# Patient Record
Sex: MALE | Race: OTHER | NOT HISPANIC OR LATINO | Employment: FULL TIME | RURAL
[De-identification: names, ages, dates, MRNs, and addresses within clinical notes are randomized per-mention and may not be internally consistent; named-entity substitution may affect disease eponyms.]

---

## 2021-04-27 RX ORDER — NIFEDIPINE 60 MG/1
30 TABLET, EXTENDED RELEASE ORAL DAILY
COMMUNITY
End: 2021-05-12 | Stop reason: SDUPTHER

## 2021-04-27 RX ORDER — POTASSIUM CHLORIDE 20 MEQ/1
20 TABLET, EXTENDED RELEASE ORAL DAILY
COMMUNITY
End: 2021-05-12 | Stop reason: SDUPTHER

## 2021-04-27 RX ORDER — TRIAMTERENE AND HYDROCHLOROTHIAZIDE 37.5; 25 MG/1; MG/1
1-2 CAPSULE ORAL EVERY MORNING
COMMUNITY
End: 2021-05-12 | Stop reason: SDUPTHER

## 2021-04-27 RX ORDER — DOXAZOSIN 4 MG/1
4 TABLET ORAL NIGHTLY
COMMUNITY
End: 2021-05-12 | Stop reason: SDUPTHER

## 2021-04-27 RX ORDER — METFORMIN HYDROCHLORIDE 1000 MG/1
1000 TABLET ORAL 2 TIMES DAILY WITH MEALS
COMMUNITY
End: 2021-05-12 | Stop reason: SDUPTHER

## 2021-04-27 RX ORDER — TADALAFIL 20 MG/1
10 TABLET ORAL DAILY
COMMUNITY
End: 2021-05-12 | Stop reason: SDUPTHER

## 2021-04-27 RX ORDER — CLONIDINE HYDROCHLORIDE 0.2 MG/1
0.2 TABLET ORAL NIGHTLY
COMMUNITY
End: 2021-05-12 | Stop reason: SDUPTHER

## 2021-04-27 RX ORDER — POLYETHYLENE GLYCOL 3350 17 G/17G
17 POWDER, FOR SOLUTION ORAL
COMMUNITY
End: 2021-05-12 | Stop reason: SDUPTHER

## 2021-05-12 ENCOUNTER — OFFICE VISIT (OUTPATIENT)
Dept: INTERNAL MEDICINE | Facility: CLINIC | Age: 46
End: 2021-05-12

## 2021-05-12 VITALS
RESPIRATION RATE: 16 BRPM | HEART RATE: 99 BPM | DIASTOLIC BLOOD PRESSURE: 80 MMHG | HEIGHT: 67 IN | SYSTOLIC BLOOD PRESSURE: 148 MMHG | WEIGHT: 275 LBS | TEMPERATURE: 98 F | BODY MASS INDEX: 43.16 KG/M2 | OXYGEN SATURATION: 96 %

## 2021-05-12 DIAGNOSIS — E78.5 HYPERLIPIDEMIA LDL GOAL <100: ICD-10-CM

## 2021-05-12 DIAGNOSIS — E11.9 CONTROLLED TYPE 2 DIABETES MELLITUS WITHOUT COMPLICATION, WITHOUT LONG-TERM CURRENT USE OF INSULIN: ICD-10-CM

## 2021-05-12 DIAGNOSIS — I10 ESSENTIAL HYPERTENSION: Primary | ICD-10-CM

## 2021-05-12 PROCEDURE — 99214 OFFICE O/P EST MOD 30 MIN: CPT | Mod: PBBFAC | Performed by: INTERNAL MEDICINE

## 2021-05-12 PROCEDURE — 99214 HC OFFICE/OUTPT VISIT, EST, LEVL IV, 30-39 MIN: ICD-10-PCS | Mod: PBBFAC,,, | Performed by: INTERNAL MEDICINE

## 2021-05-12 PROCEDURE — 99214 OFFICE O/P EST MOD 30 MIN: CPT | Mod: S$PBB,,, | Performed by: INTERNAL MEDICINE

## 2021-05-12 PROCEDURE — 99214 OFFICE O/P EST MOD 30 MIN: CPT | Mod: PBBFAC,,, | Performed by: INTERNAL MEDICINE

## 2021-05-12 RX ORDER — POTASSIUM CHLORIDE 20 MEQ/1
20 TABLET, EXTENDED RELEASE ORAL DAILY
Qty: 30 TABLET | Refills: 11 | Status: SHIPPED | OUTPATIENT
Start: 2021-05-12 | End: 2022-06-16

## 2021-05-12 RX ORDER — TRIAMTERENE AND HYDROCHLOROTHIAZIDE 37.5; 25 MG/1; MG/1
1-2 CAPSULE ORAL EVERY MORNING
Qty: 30 CAPSULE | Refills: 11 | Status: SHIPPED | OUTPATIENT
Start: 2021-05-12 | End: 2022-03-15 | Stop reason: SDUPTHER

## 2021-05-12 RX ORDER — LISINOPRIL AND HYDROCHLOROTHIAZIDE 20; 25 MG/1; MG/1
1 TABLET ORAL DAILY
Qty: 30 TABLET | Refills: 11 | Status: SHIPPED | OUTPATIENT
Start: 2021-05-12 | End: 2022-06-16 | Stop reason: SDUPTHER

## 2021-05-12 RX ORDER — NIFEDIPINE 60 MG/1
60 TABLET, EXTENDED RELEASE ORAL DAILY
Qty: 30 TABLET | Refills: 11 | Status: SHIPPED | OUTPATIENT
Start: 2021-05-12 | End: 2022-06-16 | Stop reason: SDUPTHER

## 2021-05-12 RX ORDER — DOXAZOSIN 4 MG/1
4 TABLET ORAL NIGHTLY
Qty: 30 TABLET | Refills: 11 | Status: SHIPPED | OUTPATIENT
Start: 2021-05-12 | End: 2021-09-22

## 2021-05-12 RX ORDER — LISINOPRIL AND HYDROCHLOROTHIAZIDE 20; 25 MG/1; MG/1
1 TABLET ORAL DAILY
COMMUNITY
End: 2021-05-12 | Stop reason: SDUPTHER

## 2021-05-12 RX ORDER — CLONIDINE HYDROCHLORIDE 0.2 MG/1
0.2 TABLET ORAL NIGHTLY
Qty: 30 TABLET | Refills: 11 | Status: SHIPPED | OUTPATIENT
Start: 2021-05-12 | End: 2022-06-16 | Stop reason: SDUPTHER

## 2021-05-12 RX ORDER — POLYETHYLENE GLYCOL 3350 17 G/17G
17 POWDER, FOR SOLUTION ORAL DAILY
Qty: 30 PACKET | Refills: 11 | Status: SHIPPED | OUTPATIENT
Start: 2021-05-12 | End: 2021-09-22

## 2021-05-12 RX ORDER — METFORMIN HYDROCHLORIDE 1000 MG/1
1000 TABLET ORAL 2 TIMES DAILY WITH MEALS
Qty: 60 TABLET | Refills: 11 | Status: SHIPPED | OUTPATIENT
Start: 2021-05-12 | End: 2022-10-24 | Stop reason: DRUGHIGH

## 2021-05-12 RX ORDER — TADALAFIL 20 MG/1
20 TABLET ORAL DAILY
Qty: 10 TABLET | Refills: 11 | Status: SHIPPED | OUTPATIENT
Start: 2021-05-12 | End: 2022-03-15

## 2021-08-11 ENCOUNTER — OFFICE VISIT (OUTPATIENT)
Dept: INTERNAL MEDICINE | Facility: CLINIC | Age: 46
End: 2021-08-11
Payer: COMMERCIAL

## 2021-08-11 VITALS
WEIGHT: 260 LBS | RESPIRATION RATE: 16 BRPM | SYSTOLIC BLOOD PRESSURE: 118 MMHG | BODY MASS INDEX: 40.81 KG/M2 | OXYGEN SATURATION: 96 % | HEIGHT: 67 IN | TEMPERATURE: 99 F | DIASTOLIC BLOOD PRESSURE: 78 MMHG | HEART RATE: 98 BPM

## 2021-08-11 DIAGNOSIS — E78.5 HYPERLIPIDEMIA LDL GOAL <100: ICD-10-CM

## 2021-08-11 DIAGNOSIS — I10 ESSENTIAL HYPERTENSION: Primary | ICD-10-CM

## 2021-08-11 DIAGNOSIS — E11.9 CONTROLLED TYPE 2 DIABETES MELLITUS WITHOUT COMPLICATION, WITHOUT LONG-TERM CURRENT USE OF INSULIN: ICD-10-CM

## 2021-08-11 PROCEDURE — 99214 PR OFFICE/OUTPT VISIT, EST, LEVL IV, 30-39 MIN: ICD-10-PCS | Mod: S$PBB,,, | Performed by: INTERNAL MEDICINE

## 2021-08-11 PROCEDURE — 99214 OFFICE O/P EST MOD 30 MIN: CPT | Mod: PBBFAC | Performed by: INTERNAL MEDICINE

## 2021-08-11 PROCEDURE — 99214 OFFICE O/P EST MOD 30 MIN: CPT | Mod: S$PBB,,, | Performed by: INTERNAL MEDICINE

## 2021-09-22 ENCOUNTER — OFFICE VISIT (OUTPATIENT)
Dept: FAMILY MEDICINE | Facility: CLINIC | Age: 46
End: 2021-09-22

## 2021-09-22 VITALS
HEART RATE: 90 BPM | HEIGHT: 67 IN | OXYGEN SATURATION: 98 % | BODY MASS INDEX: 41.53 KG/M2 | WEIGHT: 264.63 LBS | RESPIRATION RATE: 20 BRPM | DIASTOLIC BLOOD PRESSURE: 78 MMHG | TEMPERATURE: 98 F | SYSTOLIC BLOOD PRESSURE: 122 MMHG

## 2021-09-22 DIAGNOSIS — E78.00 HYPERCHOLESTEREMIA: ICD-10-CM

## 2021-09-22 DIAGNOSIS — E78.2 MIXED HYPERLIPIDEMIA: ICD-10-CM

## 2021-09-22 DIAGNOSIS — E66.01 SEVERE OBESITY (BMI >= 40): ICD-10-CM

## 2021-09-22 DIAGNOSIS — E11.65 TYPE 2 DIABETES MELLITUS WITH HYPERGLYCEMIA, WITHOUT LONG-TERM CURRENT USE OF INSULIN: Primary | ICD-10-CM

## 2021-09-22 DIAGNOSIS — I10 ESSENTIAL HYPERTENSION: ICD-10-CM

## 2021-09-22 DIAGNOSIS — N52.9 ERECTILE DYSFUNCTION, UNSPECIFIED ERECTILE DYSFUNCTION TYPE: ICD-10-CM

## 2021-09-22 PROCEDURE — 99203 OFFICE O/P NEW LOW 30 MIN: CPT | Mod: ,,, | Performed by: NURSE PRACTITIONER

## 2021-09-22 PROCEDURE — 99203 PR OFFICE/OUTPT VISIT, NEW, LEVL III, 30-44 MIN: ICD-10-PCS | Mod: ,,, | Performed by: NURSE PRACTITIONER

## 2021-09-22 RX ORDER — PRAVASTATIN SODIUM 20 MG/1
20 TABLET ORAL DAILY
Qty: 90 TABLET | Refills: 3 | Status: SHIPPED | OUTPATIENT
Start: 2021-09-22 | End: 2022-06-16 | Stop reason: SDUPTHER

## 2021-10-03 ENCOUNTER — HOSPITAL ENCOUNTER (EMERGENCY)
Facility: HOSPITAL | Age: 46
Discharge: HOME | End: 2021-10-03
Payer: COMMERCIAL

## 2021-10-03 VITALS
DIASTOLIC BLOOD PRESSURE: 82 MMHG | RESPIRATION RATE: 20 BRPM | SYSTOLIC BLOOD PRESSURE: 155 MMHG | TEMPERATURE: 99 F | WEIGHT: 262 LBS | HEART RATE: 83 BPM | OXYGEN SATURATION: 98 % | BODY MASS INDEX: 41.12 KG/M2 | HEIGHT: 67 IN

## 2021-10-03 DIAGNOSIS — H60.392 OTHER INFECTIVE ACUTE OTITIS EXTERNA OF LEFT EAR: Primary | ICD-10-CM

## 2021-10-03 DIAGNOSIS — J06.9 ACUTE UPPER RESPIRATORY INFECTION: ICD-10-CM

## 2021-10-03 LAB — GLUCOSE SERPL-MCNC: 99 MG/DL (ref 70–105)

## 2021-10-03 PROCEDURE — 96372 THER/PROPH/DIAG INJ SC/IM: CPT

## 2021-10-03 PROCEDURE — 82962 GLUCOSE BLOOD TEST: CPT

## 2021-10-03 PROCEDURE — 99284 EMERGENCY DEPT VISIT MOD MDM: CPT

## 2021-10-03 PROCEDURE — 99283 PR EMERGENCY DEPT VISIT,LEVEL III: ICD-10-PCS | Mod: ,,, | Performed by: NURSE PRACTITIONER

## 2021-10-03 PROCEDURE — 99283 EMERGENCY DEPT VISIT LOW MDM: CPT | Mod: ,,, | Performed by: NURSE PRACTITIONER

## 2021-10-03 PROCEDURE — 63600175 PHARM REV CODE 636 W HCPCS: Performed by: NURSE PRACTITIONER

## 2021-10-03 RX ORDER — METHYLPREDNISOLONE 4 MG/1
TABLET ORAL
Qty: 1 PACKAGE | Refills: 0 | Status: SHIPPED | OUTPATIENT
Start: 2021-10-03 | End: 2021-10-24

## 2021-10-03 RX ORDER — NEOMYCIN SULFATE, POLYMYXIN B SULFATE AND HYDROCORTISONE 10; 3.5; 1 MG/ML; MG/ML; [USP'U]/ML
4 SUSPENSION/ DROPS AURICULAR (OTIC) 3 TIMES DAILY
Qty: 10 ML | Refills: 0 | Status: SHIPPED | OUTPATIENT
Start: 2021-10-03 | End: 2022-01-04 | Stop reason: ALTCHOICE

## 2021-10-03 RX ORDER — CEFDINIR 300 MG/1
300 CAPSULE ORAL 2 TIMES DAILY
Qty: 20 CAPSULE | Refills: 0 | Status: SHIPPED | OUTPATIENT
Start: 2021-10-03 | End: 2021-10-13

## 2021-10-03 RX ORDER — CEFTRIAXONE 1 G/1
1 INJECTION, POWDER, FOR SOLUTION INTRAMUSCULAR; INTRAVENOUS
Status: COMPLETED | OUTPATIENT
Start: 2021-10-03 | End: 2021-10-03

## 2021-10-03 RX ADMIN — CEFTRIAXONE SODIUM 1 G: 1 INJECTION, POWDER, FOR SOLUTION INTRAMUSCULAR; INTRAVENOUS at 02:10

## 2021-11-10 ENCOUNTER — HOSPITAL ENCOUNTER (EMERGENCY)
Facility: HOSPITAL | Age: 46
Discharge: HOME | End: 2021-11-10
Payer: COMMERCIAL

## 2021-11-10 VITALS
BODY MASS INDEX: 39.71 KG/M2 | SYSTOLIC BLOOD PRESSURE: 135 MMHG | DIASTOLIC BLOOD PRESSURE: 85 MMHG | HEIGHT: 67 IN | WEIGHT: 253 LBS | TEMPERATURE: 98 F | RESPIRATION RATE: 17 BRPM | OXYGEN SATURATION: 99 % | HEART RATE: 85 BPM

## 2021-11-10 DIAGNOSIS — R20.2 PARESTHESIAS: Primary | ICD-10-CM

## 2021-11-10 DIAGNOSIS — N52.9 ERECTILE DYSFUNCTION: ICD-10-CM

## 2021-11-10 DIAGNOSIS — E78.00 HYPERCHOLESTEREMIA: ICD-10-CM

## 2021-11-10 DIAGNOSIS — E66.01 SEVERE OBESITY (BMI >= 40): ICD-10-CM

## 2021-11-10 DIAGNOSIS — E11.9 DIABETES MELLITUS, TYPE 2: ICD-10-CM

## 2021-11-10 DIAGNOSIS — I10 HYPERTENSION: ICD-10-CM

## 2021-11-10 LAB — GLUCOSE SERPL-MCNC: 117 MG/DL (ref 70–105)

## 2021-11-10 PROCEDURE — 99282 EMERGENCY DEPT VISIT SF MDM: CPT

## 2021-11-10 PROCEDURE — 99283 EMERGENCY DEPT VISIT LOW MDM: CPT | Mod: ,,, | Performed by: NURSE PRACTITIONER

## 2021-11-10 PROCEDURE — 82962 GLUCOSE BLOOD TEST: CPT

## 2021-11-10 PROCEDURE — 99283 PR EMERGENCY DEPT VISIT,LEVEL III: ICD-10-PCS | Mod: ,,, | Performed by: NURSE PRACTITIONER

## 2022-01-04 ENCOUNTER — TELEPHONE (OUTPATIENT)
Dept: FAMILY MEDICINE | Facility: CLINIC | Age: 47
End: 2022-01-04
Payer: COMMERCIAL

## 2022-01-04 ENCOUNTER — OFFICE VISIT (OUTPATIENT)
Dept: FAMILY MEDICINE | Facility: CLINIC | Age: 47
End: 2022-01-04
Payer: COMMERCIAL

## 2022-01-04 VITALS
BODY MASS INDEX: 39.43 KG/M2 | TEMPERATURE: 98 F | HEART RATE: 85 BPM | HEIGHT: 67 IN | DIASTOLIC BLOOD PRESSURE: 76 MMHG | OXYGEN SATURATION: 97 % | WEIGHT: 251.19 LBS | SYSTOLIC BLOOD PRESSURE: 122 MMHG | RESPIRATION RATE: 16 BRPM

## 2022-01-04 DIAGNOSIS — Z12.5 PROSTATE CANCER SCREENING: ICD-10-CM

## 2022-01-04 DIAGNOSIS — Z79.899 ENCOUNTER FOR LONG-TERM (CURRENT) USE OF OTHER MEDICATIONS: ICD-10-CM

## 2022-01-04 DIAGNOSIS — Z11.59 NEED FOR HEPATITIS C SCREENING TEST: ICD-10-CM

## 2022-01-04 DIAGNOSIS — E78.00 HYPERCHOLESTEREMIA: ICD-10-CM

## 2022-01-04 DIAGNOSIS — E11.65 TYPE 2 DIABETES MELLITUS WITH HYPERGLYCEMIA, WITHOUT LONG-TERM CURRENT USE OF INSULIN: Primary | ICD-10-CM

## 2022-01-04 DIAGNOSIS — I10 PRIMARY HYPERTENSION: ICD-10-CM

## 2022-01-04 LAB
ALBUMIN SERPL BCP-MCNC: 4 G/DL (ref 3.5–5)
ALBUMIN/GLOB SERPL: 0.9 {RATIO}
ALP SERPL-CCNC: 82 U/L (ref 45–115)
ALT SERPL W P-5'-P-CCNC: 53 U/L (ref 16–61)
ANION GAP SERPL CALCULATED.3IONS-SCNC: 12 MMOL/L (ref 7–16)
AST SERPL W P-5'-P-CCNC: 20 U/L (ref 15–37)
BASOPHILS # BLD AUTO: 0.06 K/UL (ref 0–0.2)
BASOPHILS NFR BLD AUTO: 0.7 % (ref 0–1)
BILIRUB SERPL-MCNC: 0.9 MG/DL (ref 0–1.2)
BUN SERPL-MCNC: 24 MG/DL (ref 7–18)
BUN/CREAT SERPL: 22 (ref 6–20)
CALCIUM SERPL-MCNC: 9.3 MG/DL (ref 8.5–10.1)
CHLORIDE SERPL-SCNC: 102 MMOL/L (ref 98–107)
CHOLEST SERPL-MCNC: 160 MG/DL (ref 0–200)
CHOLEST/HDLC SERPL: 3.1 {RATIO}
CO2 SERPL-SCNC: 27 MMOL/L (ref 21–32)
CREAT SERPL-MCNC: 1.07 MG/DL (ref 0.7–1.3)
CREAT UR-MCNC: 75 MG/DL (ref 39–259)
DIFFERENTIAL METHOD BLD: ABNORMAL
EOSINOPHIL # BLD AUTO: 0.14 K/UL (ref 0–0.5)
EOSINOPHIL NFR BLD AUTO: 1.7 % (ref 1–4)
ERYTHROCYTE [DISTWIDTH] IN BLOOD BY AUTOMATED COUNT: 15.7 % (ref 11.5–14.5)
EST. AVERAGE GLUCOSE BLD GHB EST-MCNC: 127 MG/DL
GLOBULIN SER-MCNC: 4.5 G/DL (ref 2–4)
GLUCOSE SERPL-MCNC: 136 MG/DL (ref 74–106)
HBA1C MFR BLD HPLC: 6.4 % (ref 4.5–6.6)
HCT VFR BLD AUTO: 44.6 % (ref 40–54)
HCV AB SER QL: NORMAL
HDLC SERPL-MCNC: 51 MG/DL (ref 40–60)
HGB BLD-MCNC: 14 G/DL (ref 13.5–18)
IMM GRANULOCYTES # BLD AUTO: 0.01 K/UL (ref 0–0.04)
IMM GRANULOCYTES NFR BLD: 0.1 % (ref 0–0.4)
LDLC SERPL CALC-MCNC: 82 MG/DL
LDLC/HDLC SERPL: 1.6 {RATIO}
LYMPHOCYTES # BLD AUTO: 3.1 K/UL (ref 1–4.8)
LYMPHOCYTES NFR BLD AUTO: 38.7 % (ref 27–41)
MCH RBC QN AUTO: 25.4 PG (ref 27–31)
MCHC RBC AUTO-ENTMCNC: 31.4 G/DL (ref 32–36)
MCV RBC AUTO: 80.8 FL (ref 80–96)
MICROALBUMIN UR-MCNC: 4 MG/DL (ref 0–2.8)
MICROALBUMIN/CREAT RATIO PNL UR: 53.3 MG/G (ref 0–30)
MONOCYTES # BLD AUTO: 0.44 K/UL (ref 0–0.8)
MONOCYTES NFR BLD AUTO: 5.5 % (ref 2–6)
MPC BLD CALC-MCNC: 11.5 FL (ref 9.4–12.4)
NEUTROPHILS # BLD AUTO: 4.26 K/UL (ref 1.8–7.7)
NEUTROPHILS NFR BLD AUTO: 53.3 % (ref 53–65)
NONHDLC SERPL-MCNC: 109 MG/DL
NRBC # BLD AUTO: 0 X10E3/UL
NRBC, AUTO (.00): 0 %
PLATELET # BLD AUTO: 272 K/UL (ref 150–400)
POTASSIUM SERPL-SCNC: 4.2 MMOL/L (ref 3.5–5.1)
PROT SERPL-MCNC: 8.5 G/DL (ref 6.4–8.2)
PSA SERPL-MCNC: 0.35 NG/ML (ref 0–2)
RBC # BLD AUTO: 5.52 M/UL (ref 4.6–6.2)
SODIUM SERPL-SCNC: 137 MMOL/L (ref 136–145)
TRIGL SERPL-MCNC: 134 MG/DL (ref 35–150)
TSH SERPL DL<=0.005 MIU/L-ACNC: 0.91 UIU/ML (ref 0.36–3.74)
VLDLC SERPL-MCNC: 27 MG/DL
WBC # BLD AUTO: 8.01 K/UL (ref 4.5–11)

## 2022-01-04 PROCEDURE — 80050 CBC WITH DIFFERENTIAL: ICD-10-PCS | Mod: ICN,,, | Performed by: CLINICAL MEDICAL LABORATORY

## 2022-01-04 PROCEDURE — 82570 ASSAY OF URINE CREATININE: CPT | Mod: ICN,,, | Performed by: CLINICAL MEDICAL LABORATORY

## 2022-01-04 PROCEDURE — 3008F PR BODY MASS INDEX (BMI) DOCUMENTED: ICD-10-PCS | Mod: ,,, | Performed by: NURSE PRACTITIONER

## 2022-01-04 PROCEDURE — 99213 PR OFFICE/OUTPT VISIT, EST, LEVL III, 20-29 MIN: ICD-10-PCS | Mod: ,,, | Performed by: NURSE PRACTITIONER

## 2022-01-04 PROCEDURE — 80061 LIPID PANEL: ICD-10-PCS | Mod: ICN,,, | Performed by: CLINICAL MEDICAL LABORATORY

## 2022-01-04 PROCEDURE — 1159F MED LIST DOCD IN RCRD: CPT | Mod: ,,, | Performed by: NURSE PRACTITIONER

## 2022-01-04 PROCEDURE — 3078F DIAST BP <80 MM HG: CPT | Mod: ,,, | Performed by: NURSE PRACTITIONER

## 2022-01-04 PROCEDURE — 86803 HEPATITIS C AB TEST: CPT | Mod: ICN,,, | Performed by: CLINICAL MEDICAL LABORATORY

## 2022-01-04 PROCEDURE — 3074F PR MOST RECENT SYSTOLIC BLOOD PRESSURE < 130 MM HG: ICD-10-PCS | Mod: ,,, | Performed by: NURSE PRACTITIONER

## 2022-01-04 PROCEDURE — 80050 GENERAL HEALTH PANEL: CPT | Mod: ICN,,, | Performed by: CLINICAL MEDICAL LABORATORY

## 2022-01-04 PROCEDURE — 99213 OFFICE O/P EST LOW 20 MIN: CPT | Mod: ,,, | Performed by: NURSE PRACTITIONER

## 2022-01-04 PROCEDURE — G0103 PSA SCREENING: HCPCS | Mod: ICN,,, | Performed by: CLINICAL MEDICAL LABORATORY

## 2022-01-04 PROCEDURE — 3078F PR MOST RECENT DIASTOLIC BLOOD PRESSURE < 80 MM HG: ICD-10-PCS | Mod: ,,, | Performed by: NURSE PRACTITIONER

## 2022-01-04 PROCEDURE — G0103 PSA, SCREENING: ICD-10-PCS | Mod: ICN,,, | Performed by: CLINICAL MEDICAL LABORATORY

## 2022-01-04 PROCEDURE — 83036 HEMOGLOBIN GLYCOSYLATED A1C: CPT | Mod: ICN,,, | Performed by: CLINICAL MEDICAL LABORATORY

## 2022-01-04 PROCEDURE — 82570 MICROALBUMIN / CREATININE RATIO URINE: ICD-10-PCS | Mod: ICN,,, | Performed by: CLINICAL MEDICAL LABORATORY

## 2022-01-04 PROCEDURE — 3052F PR MOST RECENT HEMOGLOBIN A1C LEVEL 8.0 - < 9.0%: ICD-10-PCS | Mod: ,,, | Performed by: NURSE PRACTITIONER

## 2022-01-04 PROCEDURE — 80061 LIPID PANEL: CPT | Mod: ICN,,, | Performed by: CLINICAL MEDICAL LABORATORY

## 2022-01-04 PROCEDURE — 82043 MICROALBUMIN / CREATININE RATIO URINE: ICD-10-PCS | Mod: ICN,,, | Performed by: CLINICAL MEDICAL LABORATORY

## 2022-01-04 PROCEDURE — 3074F SYST BP LT 130 MM HG: CPT | Mod: ,,, | Performed by: NURSE PRACTITIONER

## 2022-01-04 PROCEDURE — 86803 HEPATITIS C ANTIBODY: ICD-10-PCS | Mod: ICN,,, | Performed by: CLINICAL MEDICAL LABORATORY

## 2022-01-04 PROCEDURE — 3052F HG A1C>EQUAL 8.0%<EQUAL 9.0%: CPT | Mod: ,,, | Performed by: NURSE PRACTITIONER

## 2022-01-04 PROCEDURE — 3008F BODY MASS INDEX DOCD: CPT | Mod: ,,, | Performed by: NURSE PRACTITIONER

## 2022-01-04 PROCEDURE — 83036 HEMOGLOBIN A1C: ICD-10-PCS | Mod: ICN,,, | Performed by: CLINICAL MEDICAL LABORATORY

## 2022-01-04 PROCEDURE — 82043 UR ALBUMIN QUANTITATIVE: CPT | Mod: ICN,,, | Performed by: CLINICAL MEDICAL LABORATORY

## 2022-01-04 PROCEDURE — 1159F PR MEDICATION LIST DOCUMENTED IN MEDICAL RECORD: ICD-10-PCS | Mod: ,,, | Performed by: NURSE PRACTITIONER

## 2022-01-04 RX ORDER — SEMAGLUTIDE 1.34 MG/ML
1 INJECTION, SOLUTION SUBCUTANEOUS
Qty: 3 PEN | Refills: 3 | Status: SHIPPED | OUTPATIENT
Start: 2022-01-04 | End: 2022-06-16 | Stop reason: SDUPTHER

## 2022-01-04 NOTE — LETTER
January 4, 2022      [facility] - Family Medicine  Formerly [facility]  [place] [address]  Phone: [phone]  Fax: [phone]       Patient: Russel Hill   YOB: 1975  Date of Visit: 01/04/2022    To Whom It May Concern:    Asher Hill  was at [facility] on 01/04/2022. The patient may return to work/school on 01/04/2022 with no restrictions. If you have any questions or concerns, or if I can be of further assistance, please do not hesitate to contact me.    Sincerely,    MICAELA Zapien

## 2022-01-04 NOTE — PROGRESS NOTES
[facility] - FAMILY MEDICINE       PATIENT NAME: Russel Hill   : 1975    AGE: 46 y.o. DATE: 2022    MRN: [id number]        Reason for Visit / Chief Complaint:  Follow-up (Pt presents for 3 month DM, HTN, and HLD follow up. ), Hypertension, Hyperlipidemia, and Diabetes     Subjective:     HPI:  Presents for 3 mth f/u uncontrolled T2DM, HTN, and HLD. Previously saw Dr. Camejo.    Dx with DM approx . Rare A1c noted in HAC - 9.1% 2015 and 6.8% 2018.  A1c 8.7% May 2021    Has been watching diet and was exercising some but now back on 12 hr shifts. Was down 22 lbs but gained some back; still down 14 lbs from previous visit.    Monitoring glucose daily with levels ranging  after drinking champagne on [place].   Medications: metformin - causes constipation only taking 1/2 tab 2x/day; wants to start Ozempic     PHQ9 2022   Total Score 0       Review of Systems:     Review of Systems   Constitutional: Negative.    HENT: Negative.    Eyes: Negative.    Respiratory: Negative.    Cardiovascular: Negative.    Gastrointestinal: Negative.    Endocrine: Negative.    Genitourinary: Negative.    Musculoskeletal: Negative.    Skin: Negative.    Allergic/Immunologic: Negative.    Neurological: Negative.    Hematological: Negative.    Psychiatric/Behavioral: Negative.        Allergies and Medications:     Review of patient's allergies indicates:   Allergen Reactions    Marijuana/cannabinoid     Melon      watermellon        Current Outpatient Medications on File Prior to Visit   Medication Sig Dispense Refill    cloNIDine (CATAPRES) 0.2 MG tablet Take 1 tablet (0.2 mg total) by mouth every evening. 30 tablet 11    lisinopriL-hydrochlorothiazide (PRINZIDE,ZESTORETIC) 20-25 mg Tab Take 1 tablet by mouth once daily. 30 tablet 11    metFORMIN (GLUCOPHAGE) 1000 MG tablet Take 1 tablet (1,000 mg total) by mouth 2 (two) times daily with  meals. 60 tablet 11    NIFEdipine (ADALAT CC) 60 MG TbSR Take 1 tablet (60 mg total) by mouth once daily. 30 tablet 11    potassium chloride SA (K-DUR,KLOR-CON) 20 MEQ tablet Take 1 tablet (20 mEq total) by mouth once daily. 30 tablet 11    pravastatin (PRAVACHOL) 20 MG tablet Take 1 tablet (20 mg total) by mouth once daily. 90 tablet 3    triamterene-hydrochlorothiazide 37.5-25 mg (DYAZIDE) 37.5-25 mg per capsule Take 1-2 capsules by mouth every morning. 30 capsule 11    tadalafiL (CIALIS) 20 MG Tab Take 1 tablet (20 mg total) by mouth once daily. 1/2 tab one hour before sex (Patient not taking: Reported on 1/4/2022) 10 tablet 11    [DISCONTINUED] neomycin-polymyxin-hydrocortisone (CORTISPORIN) 3.5-10,000-1 mg/mL-unit/mL-% otic suspension Place 4 drops into the left ear 3 (three) times daily. (Patient not taking: Reported on 1/4/2022) 10 mL 0     No current facility-administered medications on file prior to visit.       Labs:      Lab Results   Component Value Date    HGBA1C 8.7 (H) 05/12/2021      Lipids:   Lab Results   Component Value Date    CHOL 232 (H) 05/12/2021     Lab Results   Component Value Date    HDL 52 05/12/2021     Lab Results   Component Value Date    LDLCALC 140 05/12/2021     Lab Results   Component Value Date    TRIG 199 (H) 05/12/2021     Lab Results   Component Value Date    CHOLHDL 4.5 05/12/2021      Urine Ma/creat ratio:  No results found for: MICROALBUR, [id number]   CMP:  Sodium   Date Value Ref Range Status   05/12/2021 138 136 - 145 mmol/L Final     Potassium   Date Value Ref Range Status   05/12/2021 3.4 (L) 3.5 - 5.1 mmol/L Final     Chloride   Date Value Ref Range Status   05/12/2021 103 98 - 107 mmol/L Final     CO2   Date Value Ref Range Status   05/12/2021 29 21 - 32 mmol/L Final     Glucose   Date Value Ref Range Status   05/12/2021 170 (H) 74 - 106 mg/dL Final     BUN   Date Value Ref Range Status   05/12/2021 17 7 - 18 mg/dL Final     Creatinine   Date Value Ref Range  Status   05/12/2021 0.93 0.70 - 1.30 mg/dL Final     Calcium   Date Value Ref Range Status   05/12/2021 9.8 8.5 - 10.1 mg/dL Final     Total Protein   Date Value Ref Range Status   05/12/2021 8.2 6.4 - 8.2 g/dL Final     Albumin   Date Value Ref Range Status   05/12/2021 3.7 3.5 - 5.0 g/dL Final     Bilirubin, Total   Date Value Ref Range Status   05/12/2021 0.5 >0.0 - 1.2 mg/dL Final     Alk Phos   Date Value Ref Range Status   05/12/2021 109 45 - 115 U/L Final     AST   Date Value Ref Range Status   05/12/2021 17 15 - 37 U/L Final     ALT   Date Value Ref Range Status   05/12/2021 48 16 - 61 U/L Final     Anion Gap   Date Value Ref Range Status   05/12/2021 9 7 - 16 mmol/L Final     eGFR    Date Value Ref Range Status   05/12/2021 113 >=60 mL/min/1.73m² Final      CBC:  Lab Results   Component Value Date    WBC 10.86 05/12/2021    RBC 5.30 05/12/2021    HGB 13.0 (L) 05/12/2021    HCT 41.6 05/12/2021    MCV 78.5 (L) 05/12/2021    MCH 24.5 (L) 05/12/2021    MCHC 31.3 (L) 05/12/2021    RDW 16.4 (H) 05/12/2021     05/12/2021    MPV 11.7 05/12/2021    LYMPH 27.3 05/12/2021    LYMPH 2.96 05/12/2021    MONO 5.9 05/12/2021    EOS 0.15 05/12/2021    BASO 0.06 05/12/2021    EOSINOPHIL 1.4 05/12/2021    BASOPHIL 0.6 05/12/2021      Lab Results   Component Value Date    TSH 0.974 05/12/2021         Medical/Social/Family History:     Past Medical History:   Diagnosis Date    Constipation     Diabetes mellitus, type 2 02/12/2014    Erectile dysfunction     Hypercholesteremia 03/04/2016    Hypertension     Severe obesity (BMI >= 40) 10/01/2012      Social History     Tobacco Use   Smoking Status Never Smoker   Smokeless Tobacco Never Used      Social History     Substance and Sexual Activity   Alcohol Use Never       Family History   Problem Relation Age of Onset    Cancer Mother     Diabetes Mother     Hypertension Mother     Hypertension Father     Diabetes Father     Hypertension Sister      "Hypertension Brother     No Known Problems Daughter     No Known Problems Son     Cancer Maternal Grandmother     Diabetes Maternal Grandmother     Hypertension Maternal Grandmother     Cancer Maternal Grandfather     Diabetes Maternal Grandfather     Hypertension Maternal Grandfather     Cancer Paternal Grandmother     Diabetes Paternal Grandmother     Hypertension Paternal Grandmother     Cancer Paternal Grandfather     Diabetes Paternal Grandfather     Hypertension Paternal Grandfather     No Known Problems Daughter     No Known Problems Daughter     No Known Problems Daughter     No Known Problems Son       No past surgical history on file.     Health Maintenance:     There is no immunization history on file for this patient.   Health Maintenance Due   Topic Date Due    Hepatitis C Screening  Never done    Diabetes Urine Screening  Never done    COVID-19 Vaccine (1) Never done    Pneumococcal Vaccines (Age 0-64) (1 of 2 - PPSV23) Never done    Foot Exam  Never done    Eye Exam  Never done    HIV Screening  Never done    TETANUS VACCINE  Never done    Colorectal Cancer Screening  Never done    Influenza Vaccine (1) Never done    Hemoglobin A1c  11/12/2021     Health Maintenance Topics with due status: Not Due       Topic Last Completion Date    Lipid Panel 05/12/2021    Low Dose Statin 01/04/2022       Objective:      Vitals:    01/04/22 0944   BP: 122/76   BP Location: Right arm   Patient Position: Sitting   BP Method: Large (Manual)   Pulse: 85   Resp: 16   Temp: 98.1 °F (36.7 °C)   SpO2: 97%   Weight: 113.9 kg (251 lb 3.2 oz)   Height: 5' 7" (1.702 m)     Body mass index is 39.34 kg/m².     Physical Exam:    Physical Exam  Vitals and nursing note reviewed.   Constitutional:       Appearance: Normal appearance. He is obese.   HENT:      Head: Normocephalic.   Eyes:      Conjunctiva/sclera: Conjunctivae normal.      Pupils: Pupils are equal, round, and reactive to light.   Neck:      " Thyroid: No thyromegaly.      Vascular: No carotid bruit.      Trachea: Trachea normal.   Cardiovascular:      Rate and Rhythm: Normal rate and regular rhythm.      Pulses:           Dorsalis pedis pulses are 3+ on the right side and 3+ on the left side.        Posterior tibial pulses are 3+ on the right side and 3+ on the left side.      Heart sounds: Normal heart sounds.   Pulmonary:      Effort: Pulmonary effort is normal.      Breath sounds: Normal breath sounds.   Chest:   Breasts:      Right: No supraclavicular adenopathy.      Left: No supraclavicular adenopathy.       Musculoskeletal:      Cervical back: Neck supple.      Right lower leg: No edema.      Left lower leg: No edema.      Right foot: Normal range of motion. No deformity or bunion.      Left foot: Normal range of motion. No deformity or bunion.   Feet:      Right foot:      Protective Sensation: 10 sites tested. 10 sites sensed.      Skin integrity: Skin integrity normal. No ulcer, blister, erythema, warmth or callus.      Toenail Condition: Right toenails are abnormally thick. Fungal disease present.     Left foot:      Protective Sensation: 10 sites tested. 10 sites sensed.      Skin integrity: Skin integrity normal. No ulcer, blister, erythema, warmth or callus.      Toenail Condition: Left toenails are abnormally thick. Fungal disease present.  Lymphadenopathy:      Cervical: No cervical adenopathy.      Upper Body:      Right upper body: No supraclavicular adenopathy.      Left upper body: No supraclavicular adenopathy.   Skin:     General: Skin is warm and dry.      Findings: No rash.   Neurological:      General: No focal deficit present.      Mental Status: He is alert and oriented to person, place, and time.   Psychiatric:         Mood and Affect: Mood normal.         Behavior: Behavior normal.          Assessment:          ICD-10-CM ICD-9-CM   1. Type 2 diabetes mellitus with hyperglycemia, without long-term current use of insulin   E11.65 250.00     790.29   2. Primary hypertension  I10 401.9   3. Hypercholesteremia  E78.00 272.0   4. Encounter for long-term (current) use of other medications  Z79.899 V58.69   5. Need for hepatitis C screening test  Z11.59 V73.89   6. Prostate cancer screening  Z12.5 V76.44        Plan:       Type 2 diabetes mellitus with hyperglycemia, without long-term current use of insulin  -     Comprehensive Metabolic Panel; Future; Expected date: 01/04/2022  -     Lipid Panel; Future; Expected date: 01/04/2022  -     TSH; Future; Expected date: 01/04/2022  -     Microalbumin/Creatinine Ratio, Urine; Future; Expected date: 01/04/2022  -     Hemoglobin A1C; Future; Expected date: 01/04/2022  -     semaglutide (OZEMPIC) 1 mg/dose (4 mg/3 mL); Inject 1 mg into the skin every 7 days.  Dispense: 3 pen; Refill: 3    Primary hypertension  -     Comprehensive Metabolic Panel; Future; Expected date: 01/04/2022  -     Lipid Panel; Future; Expected date: 01/04/2022    Hypercholesteremia  -     Comprehensive Metabolic Panel; Future; Expected date: 01/04/2022  -     Lipid Panel; Future; Expected date: 01/04/2022  -     TSH; Future; Expected date: 01/04/2022    Encounter for long-term (current) use of other medications  -     CBC Auto Differential; Future; Expected date: 01/04/2022  -     Comprehensive Metabolic Panel; Future; Expected date: 01/04/2022  -     TSH; Future; Expected date: 01/04/2022    Need for hepatitis C screening test  -     Hepatitis C Antibody; Future; Expected date: 01/04/2022    Prostate cancer screening  -     PSA, Screening; Future; Expected date: 01/04/2022        Current Outpatient Medications:     cloNIDine (CATAPRES) 0.2 MG tablet, Take 1 tablet (0.2 mg total) by mouth every evening., Disp: 30 tablet, Rfl: 11    lisinopriL-hydrochlorothiazide (PRINZIDE,ZESTORETIC) 20-25 mg Tab, Take 1 tablet by mouth once daily., Disp: 30 tablet, Rfl: 11    metFORMIN (GLUCOPHAGE) 1000 MG tablet, Take 1 tablet (1,000 mg  total) by mouth 2 (two) times daily with meals., Disp: 60 tablet, Rfl: 11    NIFEdipine (ADALAT CC) 60 MG TbSR, Take 1 tablet (60 mg total) by mouth once daily., Disp: 30 tablet, Rfl: 11    potassium chloride SA (K-DUR,KLOR-CON) 20 MEQ tablet, Take 1 tablet (20 mEq total) by mouth once daily., Disp: 30 tablet, Rfl: 11    pravastatin (PRAVACHOL) 20 MG tablet, Take 1 tablet (20 mg total) by mouth once daily., Disp: 90 tablet, Rfl: 3    triamterene-hydrochlorothiazide 37.5-25 mg (DYAZIDE) 37.5-25 mg per capsule, Take 1-2 capsules by mouth every morning., Disp: 30 capsule, Rfl: 11    semaglutide (OZEMPIC) 1 mg/dose (4 mg/3 mL), Inject 1 mg into the skin every 7 days., Disp: 3 pen, Rfl: 3    tadalafiL (CIALIS) 20 MG Tab, Take 1 tablet (20 mg total) by mouth once daily. 1/2 tab one hour before sex (Patient not taking: Reported on 1/4/2022), Disp: 10 tablet, Rfl: 11      New & refilled meds:  Requested Prescriptions     Signed Prescriptions Disp Refills    semaglutide (OZEMPIC) 1 mg/dose (4 mg/3 mL) 3 pen 3     Sig: Inject 1 mg into the skin every 7 days.        Lab results and schedule of future lab studies reviewed with patient.   Reviewed diet, exercise and weight control.    Discussed foot care.   Declines vaccines.   Add Ozempic - give samples to start. Let us know if unable to get rx. Will take 0.25 mg weekly x 4 weeks, 0.5 mg weekly x 4 weeks then start 1 mg rx.     Return to clinic in 3 months for T2DM, HTN, HLD; and as needed.    Future Appointments   Date Time Provider Department Center   4/5/2022  8:00 AM MICAELA Lowry [facility] Torres        Signature:  MICAELA Lowry [facility] PRIMARY CARE - FAMILY MEDICINE    Date of encounter: 1/4/22

## 2022-01-04 NOTE — PATIENT INSTRUCTIONS
Patient Education       Type 2 Diabetes Discharge Instructions   About this topic   Type 2 diabetes is the most common type of diabetes. If you have this illness, your body does not make enough insulin or does not correctly use the insulin it does make. When you eat, your body breaks down all sugars and starches into glucose. Your body needs insulin to use glucose for energy. The insulin takes the glucose from your blood into your cells. If you do not have enough insulin, or your body does not use the insulin well, the glucose or sugar stays in your blood instead of going into your cells. This causes your blood sugar levels to be too high. Over time, this can damage your heart, nerves, eyes, kidneys, and other organs.     What care is needed at home?   Learn how to take care of your diabetes.  · Ask your doctor what you need to do when you go home. Make sure you ask questions if you do not understand what the doctor says. This way you will know what you need to do.  · Based on what diabetes drugs you take, you might need to check your blood sugar regularly at home. But not everyone with type 2 diabetes needs to do this. If you need to, your doctor will teach you how to check your blood sugar. Ask what the goal numbers are for your blood sugar. Keep a list of your blood sugar levels. This will help you learn what causes high or low readings and help you manage your diabetes.     · Take your diabetes drugs as directed. Ask what to do if you miss a dose of your diabetes drugs.  · Learn when, what, and how much to eat.  · Be active. Walk, garden, or do something active for 30 minutes or more on most days of the week. This will also help you control your weight. Ask your doctor for proper food and exercise programs to follow.  · Check your feet often. Look for blisters or sores. Always wear socks and shoes. Never walk barefoot, especially outdoors. Take special care around the pool and at the beach, as these surfaces may  be extremely hot and burn your feet. Report any problems with your feet to your doctor.  · Wear a medical ID.  · Limit or avoid beer, wine, and mixed drinks (alcohol).  · If you smoke, try to quit. Your doctor or nurse can help.  · Talk with your doctor about if you need to check your blood sugar at home.  · If you are overweight, ask your doctor if you would be a good candidate for bariatric surgery as this can reverse diabetes in some cases.  What follow-up care is needed?   · Your doctor may ask you to make visits to the office to check on your progress. Be sure to keep these visits.  What drugs may be needed?   Diabetes drugs will help control your blood sugar. You may have more than one diabetes drug. Your doctor may order drugs for you to take by mouth or insulin as a shot. You will be trained on how to give insulin shots, if needed. Talk to your doctor about your diabetes drugs and what you need to do when you go home.  Will physical activity be limited?   · Being active can lower blood sugar and blood pressure. It can also help control weight. Be sure to check with your doctor before starting any exercise program.  · Try to walk, bike, or swim every day. Start with 5 to 10 minutes each day. Work up to about 30 minutes most days.  · Drink lots of water during workouts.  What changes to diet are needed?   Eating a healthy diet is important. This means you need to eat regularly throughout the day. You need to include a variety of foods such as fruits and vegetables, whole grains, nonfat dairy products, and lean meats. Do not eat too much food at one time and do not skip meals. Limit foods high in sugar like sweets, desserts, and fruit juices. Ask your doctor about what kind of diet is right for you.  What problems could happen?   · Heart, kidney, and nerve problems  · Foot problems. Sores and infection may also happen.  · Eye problems  · Dangerously high blood sugar levels requiring emergency  treatment  · Severe infections  Talk with your doctor often. Other drugs or care may be needed to treat or prevent these problems.  When do I need to call the doctor?   · You have signs of high blood sugar like you:  ? Are more thirsty  ? Are urinating more often  ? Have new shortness of breath  ? Have belly pain, an upset stomach, or are throwing up.  ? Are more tired than normal  ? Have a very dry mouth or a fruity breath odor  · Signs of infection. These include a fever of 100.4°F (38°C) or higher, chills, or a wound that will not heal.  · Blurred vision  · Chest pain  · Swelling in your legs  · Change in color and odor of your feet  · Blood sugar that remains high and does not respond to treatment  Helpful tips   Talk to your doctor about getting a flu shot and pneumonia shot.  Teach Back: Helping You Understand   The Teach Back Method helps you understand the information we are giving you. After you talk with the staff, tell them in your own words what you learned. This helps to make sure the staff has described each thing clearly. It also helps to explain things that may have been confusing. Before going home, make sure you can do these:  · I can tell you about my condition.  · I can tell you what I need to do to control my blood sugar.  · I can tell you what I will do if I have signs of high blood sugar.  Where can I learn more?   Center for Disease Control and Prevention  https://www.cdc.gov/diabetes/basics/type2.html   International Diabetes Foundation  https://www.idf.org/aboutdiabetes/type-2-diabetes.html   UpToDate  https://www.[employer].com/contents/type-2-diabetes-overview-beyond-the-basics   Last Reviewed Date   2021-05-04  Consumer Information Use and Disclaimer   This information is not specific medical advice and does not replace information you receive from your health care provider. This is only a brief summary of general information. It does NOT include all information about conditions,  "illnesses, injuries, tests, procedures, treatments, therapies, discharge instructions or life-style choices that may apply to you. You must talk with your health care provider for complete information about your health and treatment options. This information should not be used to decide whether or not to accept your health care providers advice, instructions or recommendations. Only your health care provider has the knowledge and training to provide advice that is right for you.  Copyright   Copyright © 2021 UpToDate, Inc. and its affiliates and/or licensors. All rights reserved.    Patient Education       Diabetes and Diet   The Basics   Written by the doctors and editors at [employer]   Why is diet important in diabetes? -- Diet is important because it is part of diabetes treatment. Many people need to change what they eat and how much they eat to help treat their diabetes. It is important for people to treat their diabetes so that they:  · Keep their blood sugar at or near a normal level  · Prevent long-term problems, such as heart or kidney problems, that can happen in people with diabetes  Changing your diet can also help treat obesity, high blood pressure, and high cholesterol. These conditions can affect people with diabetes and can lead to future problems, such as heart attacks or strokes.  Who will work with me to change my diet? -- Your doctor or nurse will work with you to make a food plan to change your diet. They might also recommend that you work with a "dietitian." A dietitian is an expert on food and eating.  Do I need to eat at the same times every day? -- When and how often you should eat depends, in part, on the diabetes medicines you take. For example:  · People who take about the same amount of insulin at the same time each day (called a "fixed regimen") should eat meals at the same times. This is also true for people who take pills that increase insulin levels, such as sulfonylureas. Eating " "meals at the same time every day helps prevent low blood sugar.  · People who adjust the dose and timing of their insulin each day (called a "flexible regimen") do not always have to eat meals at the same time. That's because they can time their insulin dose for before they plan to eat, and also adjust the dose for how much they plan to eat.  · People who take medicines that don't usually cause low blood sugar, such as metformin, don't have to eat meals at the same time every day.  What do I need to think about when planning what to eat? -- Our bodies break down the food we eat into small pieces called carbohydrates, proteins, and fats.  When planning what to eat, people with diabetes need to think about:  · Carbohydrates (or "carbs") - Carbohydrates, which are sugars that our bodies use for energy, can raise a person's blood sugar level. Your doctor, nurse, or dietitian will tell you how many carbohydrates you should eat at each meal or snack. Foods that have carbohydrates include:  ? Bread, pasta, and rice  ? Vegetables and fruits  ? Dairy foods  ? Foods and drinks with added sugar  It is best to get your carbohydrates from fruits, vegetables, whole grains, and low-fat milk. It is best to avoid drinks with added sugar, like soda, juices, and sports drinks.   · Protein - Your doctor, nurse, or dietitian will tell you how much protein you should eat each day. It is best to eat lean meats, fish, eggs, beans, peas, soy products, nuts, and seeds.  · Fats - The type of fat you eat is more important than the amount of fat. "Saturated" and "trans" fats can increase your risk for heart problems, like a heart attack.  ? Foods that have saturated fats include meat, butter, cheese, and ice cream.  ? Foods that have trans fats include processed food with "partially hydrogenated oils" on the ingredient list. This may include fried foods, store bought cookies, muffins, pies, and cakes.  "Monounsaturated" and "polyunsaturated" " fats are better for you. Foods with these types of fat include fish, avocado, olive oil, and nuts.  · Calories - People need to eat a certain amount of calories each day to keep their weight the same. People who are overweight and want to lose weight need to eat fewer calories each day.  · Fiber - Eating foods with a lot of fiber can help control a person's blood sugar level. Foods that have a lot of fiber include apples, green beans, peas, beans, lentils, nuts, oatmeal, and whole grains.  · Salt - People who have high blood pressure should not eat foods that contain a lot of salt (also called sodium). People with high blood pressure should also eat healthy foods, such as fruits, vegetables, and low-fat dairy foods.  · Alcohol - Having more than 1 drink (for women) or 2 drinks (for men) a day can raise blood sugar levels. Also, drinks that have fruit juice or soda in them can raise blood sugar levels.  What can I do if I need to lose weight? -- If you need to lose weight, you can:  · Exercise - Try to get at least 30 minutes of physical activity a day, most days of the week. Even gentle exercise, like walking, is good for your health. Some people with diabetes need to change their medicine dose before they exercise. They might also need to check their blood sugar levels before and after exercising.  · Eat fewer calories - Your doctor, nurse, or dietitian can tell you how many calories you should eat each day in order to lose weight.  If you are worried about your weight, size, or shape, talk with your doctor, nurse, or dietitian. They can help you make changes to improve your health.  Can I eat the same foods as my family? -- Yes. You do not need to eat special foods if you have diabetes. You and your family can eat the same foods. Changing your diet is mostly about eating healthy foods and not eating too much.  What are the other parts of diabetes treatment? -- Besides changing your diet, the other parts of  diabetes treatment are:  · Exercise  · Medicines  Some people with diabetes need to learn how to match their diet and exercise with their medicine dose. For example, people who use insulin might need to choose the dose of insulin they give themselves. To choose their dose, they need to think about:  · What they plan to eat at the next meal  · How much exercise they plan to do  · What their blood sugar level is  If the diet and exercise do not match the medicine dose, a person's blood sugar level can get too low or too high. Blood sugar levels that are too low or too high can cause problems.  All topics are updated as new evidence becomes available and our peer review process is complete.  This topic retrieved from Number 100 on: Sep 21, 2021.  Topic 53450 Version 7.0  Release: 29.4.2 - C29.263  © 2021 UpToDate, Inc. and/or its affiliates. All rights reserved.  Consumer Information Use and Disclaimer   This information is not specific medical advice and does not replace information you receive from your health care provider. This is only a brief summary of general information. It does NOT include all information about conditions, illnesses, injuries, tests, procedures, treatments, therapies, discharge instructions or life-style choices that may apply to you. You must talk with your health care provider for complete information about your health and treatment options. This information should not be used to decide whether or not to accept your health care provider's advice, instructions or recommendations. Only your health care provider has the knowledge and training to provide advice that is right for you. The use of this information is governed by the [employer] End User License Agreement, available at https://www.[employer]/en/solutions/[employer]about/marly.The use of Number 100 content is governed by the Number 100 Terms of Use. ©2021 UpToDate, Inc. All rights reserved.  Copyright   © 2021 UpToDate, Inc. and/or its  affiliates. All rights reserved.    Patient Education       Weight Loss Tips   About this topic   More and more people are concerned about their weight. You can choose from many different programs. The goal of a weight loss program may be to cut down on calories or to lose extra weight through exercise.  Losing weight may mean changing your ideas about food. Going on a diet and losing weight does not mean starving yourself. It means cutting down on the amount of food you eat, making healthy food choices, and being active.  General   Ideally, you need to lose 1 to 2 pounds (0.5 to 1 kg) a week for a healthy weight loss. Losing too much weight too fast is not good. When you take in fewer calories, you will lose weight. Your ideal calorie and weight goal depends on your current age, weight, height, and personal goals. Ask your doctor or dietitian what your ideal weight is.  You need to burn 3500 calories to lose 1 pound (0.5 kg). That means cutting out 500 calories every day for 7 days. You can cut out 500 calories per day by eating or drinking fewer calories, burning them through exercise, or doing both. To lose that extra weight and stay healthy:  · Take time to exercise.  ? Exercise regularly. Burn calories with activity and exercise. Exercise can help you lose weight and it also strengthens your muscles. Set a schedule where you will have time to do exercises. With just 30 to 60 minutes of exercise each day, you could burn 500 extra calories. Your metabolism stays elevated for a period of time after exercise.  ? If you don't have time for a 30 minute workout, try three 10 minute exercises each day.  ? If you work near your home, walk to work. Walking is a very good form of exercise.  ? Take a 20 minute walk each day. Walk during your lunch break. Park far away, so you have to walk more.  ? Take the steps instead of elevators. You will burn more calories this way.  ? If you have an illness, like diabetes or high  "blood pressure, ask your doctor how much exercise is right for you.  · Choose healthy snacks.  ? Low calorie healthy snacks are a good thing. They help your blood sugar stay even and prevent you from overeating at meals. Choose a balanced snack, such as a small apple with 2 tablespoons (30 grams) of peanut butter.  ? Keep in mind, even "low calorie" foods can add up. Just because you choose low calorie foods does not mean you do not have to count the calories you eat.  ? Pack a few fresh fruits or a small salad to take to work or school. Avoid buying a snack at the nearest vending machine.  ? When you feel thirsty, drink water. Water has no calories and is a very good thirst quencher.  · Plan healthy meals.  ? Plan ahead. Keep a diary of foods that are low in calories. You can also make a list of meal plans for your breakfast, lunch, and dinner. Planning ahead will prevent you from eating out at a fast food place or restaurant.  ? Make a grocery list before shopping so you only buy food you need. Don't go to the store hungry.  ? Visit a dietitian. This person will help you make meal plans that will help you lose weight.  ? Add fiber to your meals. Adding fiber helps you to feel full for a longer amount of time.  · Take care when eating out.  ? Choose lower fat and lower calorie meals. Try a seafood, lean meat, or vegetarian entrée.  ? Share a meal with a friend.  ? Try a salad and appetizer instead of an entree.  ? Ask for a to-go box when dinner is served and put half of your meal in it for a later meal.  ? Have fruit for dessert.  ? Drink water instead of other high calorie drinks.  · Learn not to overeat.  ? Watch your portions. For example, the recommended serving size of meat is 3 ounces (90 grams). This is the size of a deck of playing cards. Two tablespoons (30 grams) of peanut butter is the size of a ping-pong ball. One medium fruit is the size of a baseball.  ? Use a smaller plate or glass during dinner for " less calorie intake.  ? Try drinking a glass or two of water before eating. This may make you feel more full and help you to eat less.  ? Eat slowly. Take at least 30 minutes to eat. This gives time for your brain to tell your stomach you are full. This will help you avoid overeating.  ? Some people eat smaller meals more often to help not to overeat. If you can eat six small meals, make them healthy and low calorie. If three meals are best for you, know your calorie level for the day and spread it out into three healthy low calorie meals.     What will the results be?   Losing weight may make you healthier. You also may have more energy for your daily activities. You may lower disease risk. You may also add years to your life.  What changes to diet are needed?   · Learn how to read nutrition labels. Know the serving size. Knowing the calories in an item will help you make healthier choices and lose weight.  · Keep a diary of the food you eat. This will help you count the calories you are taking in.  · Make a menu in advance. This will help you make good choices to include in your diet.  · Avoid eating 2 hours before bedtime to allow for digestion. If you eat right before you go to bed, you may also have worse heartburn.  · Be sure to count the calories in the things you drink. You may want to stop drinking soda pop, beer, wine, and mixed drinks (alcohol). Some coffee drinks also have a lot of calories in them.  Who should use this diet?   A weight loss diet may be needed for people with a calculated body mass index of 25 and over. This means you are overweight or obese.  Who should not use this diet?   People with BMI of 18.5 or lower should not use this diet. Do not use this diet if your doctor does not recommend weight loss.  What foods are good to eat?   Choose foods that are nutritious. Remember, portion control is key. Even a low calorie food can become high in calories if you have too big of a serving. Here  is a list of foods that are good to eat:  Vegetables:   · Broccoli  · Asparagus  · Spinach  · Green leafy vegetables  · Tomatoes  · Onions  · Mushrooms  · Cucumbers  · Zucchini  Lean proteins:   · Egg whites  · Beans including kidney, navy, black, and chickpeas  · Grilled, broiled, or baked skinless chicken breast  · Grilled, broiled, or baked skinless turkey breast  · Lean beef  · [place] meat  · Grilled, broiled, or baked fish  · Beans  · Nuts, such as almonds, cashews, and pistachios  · Seeds  Whole grains and carbs:   · Oatmeal  · Brown rice  · Sweet potatoes  · Cereal  · Whole grain bread or pasta  Fruits:   · Apples  · Grapefruit  · Blueberries  · Oranges  · Bananas  · Grapes  · Peaches  · Pineapple  · Strawberries  Dairy:   · Fat-free or low-fat milk and cheese  · Low fat yogurt  · Soy, rice, or almond milk  What foods should be limited or avoided?   Limit or avoid foods that are high in calories like:  · Junk foods  · Fried foods  · Fatty foods  · Processed meats  · Food with saturated and trans fat  · Whole fat dairy products  · Butter  · Cheese  · Ice cream  · Food and drinks with a lot of sugar. Some examples are beer, wine, mixed drinks (alcohol), carbonated sodas, cakes, and cookies.  When do I need to call the doctor?   · Weakness  · Fast heartbeat  · Dizziness  Helpful tips   · Join a support group and an exercise group. It is much easier to lose weight if you have support and encouragement.  · Do not skip meals. If you skip a meal, you most likely will overeat at that next meal.  · Eat at the dining room table instead in front of the TV to help monitor intake.  Where can I learn more?   Academy of Nutrition and Dietetics  https://www.eatright.org/health/weight-loss/your-health-and-your-weight/back-to-basics-for-healthy-weight-loss   Centers for Disease Control and Prevention  https://www.cdc.gov/healthyweight/   Weight-Control Information  Network  https://www.niddk.nih.gov/health-information/diet-nutrition/changing-habits-better-health   Last Reviewed Date   2021-08-09  Consumer Information Use and Disclaimer   This information is not specific medical advice and does not replace information you receive from your health care provider. This is only a brief summary of general information. It does NOT include all information about conditions, illnesses, injuries, tests, procedures, treatments, therapies, discharge instructions or life-style choices that may apply to you. You must talk with your health care provider for complete information about your health and treatment options. This information should not be used to decide whether or not to accept your health care providers advice, instructions or recommendations. Only your health care provider has the knowledge and training to provide advice that is right for you.  Copyright   Copyright © 2021 UpToDate, Inc. and its affiliates and/or licensors. All rights reserved.    Patient Education       Pneumococcal Polysaccharide Vaccine (23-Valent) (noo demi ACOSTA, TWEN tamara three VAY lent)   Brand Names: US Pneumovax 23   Brand Names: Pratik Pneumo 23; Pneumovax 23   What is this drug used for?   · It is used to prevent pneumococcal disease.    What do I need to tell my doctor BEFORE I take this drug?   · If you are allergic to this drug; any part of this drug; or any other drugs, foods, or substances. Tell your doctor about the allergy and what signs you had.  · If you have an infection or an illness with a fever.  This is not a list of all drugs or health problems that interact with this drug.  Tell your doctor and pharmacist about all of your drugs (prescription or OTC, natural products, vitamins) and health problems. You must check to make sure that it is safe for you to take this drug with all of your drugs and health problems. Do not start, stop, or change the dose of any drug  without checking with your doctor.  What are some things I need to know or do while I take this drug?   · Tell all of your health care providers that you take this drug. This includes your doctors, nurses, pharmacists, and dentists.  · This drug may not protect all people who use it. Talk with the doctor.  · Tell your doctor if you are pregnant, plan on getting pregnant, or are breast-feeding. You will need to talk about the benefits and risks to you and the baby.    What are some side effects that I need to call my doctor about right away?   WARNING/CAUTION: Even though it may be rare, some people may have very bad and sometimes deadly side effects when taking a drug. Tell your doctor or get medical help right away if you have any of the following signs or symptoms that may be related to a very bad side effect:  · Signs of an allergic reaction, like rash; hives; itching; red, swollen, blistered, or peeling skin with or without fever; wheezing; tightness in the chest or throat; trouble breathing, swallowing, or talking; unusual hoarseness; or swelling of the mouth, face, lips, tongue, or throat.  What are some other side effects of this drug?   All drugs may cause side effects. However, many people have no side effects or only have minor side effects. Call your doctor or get medical help if any of these side effects or any other side effects bother you or do not go away:  · Pain, redness, or swelling where the shot was given.  · Headache.  · Feeling tired or weak.  · Muscle pain.  These are not all of the side effects that may occur. If you have questions about side effects, call your doctor. Call your doctor for medical advice about side effects.  You may report side effects to your national health agency.  Report side effects to the FDA/CDC Vaccine Adverse Event Reporting System (VAERS) at https://vaers.hhs.gov/reportevent.html or by calling 1-569.596.6419.  How is this drug best taken?   Use this drug as ordered  by your doctor. Read all information given to you. Follow all instructions closely.  · It is given as a shot into the fatty part of the skin or a muscle.  What do I do if I miss a dose?   · Call your doctor to find out what to do.    How do I store and/or throw out this drug?   · If you need to store this drug at home, talk with your doctor, nurse, or pharmacist about how to store it.    General drug facts   · If your symptoms or health problems do not get better or if they become worse, call your doctor.  · Do not share your drugs with others and do not take anyone else's drugs.  · Keep all drugs in a safe place. Keep all drugs out of the reach of children and pets.  · Throw away unused or  drugs. Do not flush down a toilet or pour down a drain unless you are told to do so. Check with your pharmacist if you have questions about the best way to throw out drugs. There may be drug take-back programs in your area.  · Some drugs may have another patient information leaflet. If you have any questions about this drug, please talk with your doctor, nurse, pharmacist, or other health care provider.  · Some drugs may have another patient information leaflet. Check with your pharmacist. If you have any questions about this drug, please talk with your doctor, nurse, pharmacist, or other health care provider.  · If you think there has been an overdose, call your poison control center or get medical care right away. Be ready to tell or show what was taken, how much, and when it happened.    CDC Information   Vaccine Information Statements (VIS) are made by the staff of the Centers for Disease Control and Prevention (CDC). Each VIS gives information to properly inform the adult receiving the vaccine or, in the case of a minor, the child's parent or legal representative about the risks and benefits of each vaccine. Before a doctor vaccinates a child or an adult, the provider is required by the National Childhood Vaccine  Injury Act to give a copy of the VIS. You can also get foreign language versions.  https://www.cdc.gov/vaccines/hcp/vis/vis-statements/ppv.html   Consumer Information Use and Disclaimer   This generalized information is a limited summary of diagnosis, treatment, and/or medication information. It is not meant to be comprehensive and should be used as a tool to help the user understand and/or assess potential diagnostic and treatment options. It does NOT include all information about conditions, treatments, medications, side effects, or risks that may apply to a specific patient. It is not intended to be medical advice or a substitute for the medical advice, diagnosis, or treatment of a health care provider based on the health care provider's examination and assessment of a patient's specific and unique circumstances. Patients must speak with a health care provider for complete information about their health, medical questions, and treatment options, including any risks or benefits regarding use of medications. This information does not endorse any treatments or medications as safe, effective, or approved for treating a specific patient. UpToDate, Inc. and its affiliates disclaim any warranty or liability relating to this information or the use thereof. The use of this information is governed by the Terms of Use, available at https://www.[employer].com/en/solutions/lexicomp/about/marly.  Last Reviewed Date   2019-11-25  Copyright   © 2021 UpToDate, Inc. and its affiliates and/or licensors. All rights reserved.

## 2022-01-04 NOTE — TELEPHONE ENCOUNTER
Let him know that I will be working on the PA. It could take about a week to get this done. I will let him know when I get response if the med is approved or not.

## 2022-01-04 NOTE — TELEPHONE ENCOUNTER
----- Message from Bernarda Cobian sent at 1/4/2022 11:07 AM CST -----  Pt stated that he went to pharm to get diabetic shot and they stated that he needed a pre-authorization       [phone]

## 2022-01-07 ENCOUNTER — LAB VISIT (OUTPATIENT)
Dept: PRIMARY CARE CLINIC | Facility: CLINIC | Age: 47
End: 2022-01-07

## 2022-01-07 DIAGNOSIS — Z02.83 ENCOUNTER FOR DRUG SCREENING: Primary | ICD-10-CM

## 2022-01-07 PROCEDURE — 99000 PR SPECIMEN HANDLING,DR OFF->LAB: ICD-10-PCS | Mod: ,,, | Performed by: NURSE PRACTITIONER

## 2022-01-07 PROCEDURE — 99000 SPECIMEN HANDLING OFFICE-LAB: CPT | Mod: ,,, | Performed by: NURSE PRACTITIONER

## 2022-02-22 ENCOUNTER — OFFICE VISIT (OUTPATIENT)
Dept: FAMILY MEDICINE | Facility: CLINIC | Age: 47
End: 2022-02-22
Payer: COMMERCIAL

## 2022-02-22 VITALS
BODY MASS INDEX: 39.39 KG/M2 | OXYGEN SATURATION: 97 % | TEMPERATURE: 99 F | SYSTOLIC BLOOD PRESSURE: 133 MMHG | HEART RATE: 81 BPM | HEIGHT: 67 IN | WEIGHT: 251 LBS | DIASTOLIC BLOOD PRESSURE: 94 MMHG

## 2022-02-22 DIAGNOSIS — Z11.52 ENCOUNTER FOR SCREENING LABORATORY TESTING FOR COVID-19 VIRUS: Primary | ICD-10-CM

## 2022-02-22 LAB
CTP QC/QA: YES
SARS-COV-2 AG RESP QL IA.RAPID: NEGATIVE

## 2022-02-22 PROCEDURE — 87426 SARS CORONAVIRUS 2 ANTIGEN POCT: ICD-10-PCS | Mod: QW,,, | Performed by: NURSE PRACTITIONER

## 2022-02-22 PROCEDURE — 87426 SARSCOV CORONAVIRUS AG IA: CPT | Mod: QW,,, | Performed by: NURSE PRACTITIONER

## 2022-02-22 PROCEDURE — 99212 OFFICE O/P EST SF 10 MIN: CPT | Mod: ,,, | Performed by: NURSE PRACTITIONER

## 2022-02-22 PROCEDURE — 99212 PR OFFICE/OUTPT VISIT, EST, LEVL II, 10-19 MIN: ICD-10-PCS | Mod: ,,, | Performed by: NURSE PRACTITIONER

## 2022-02-22 NOTE — LETTER
February 22, 2022      [facility]  [address] [place] [address] [place] [address]  Phone: [phone]  Fax: [phone]       Patient: Russel Hill   YOB: 1975  Date of Visit: 02/22/2022    To Whom It May Concern:    Asher Hill  was at  on 02/22/2022. The patient may return to work/school on 2/23/2022 with no restrictions. If you have any questions or concerns, or if I can be of further assistance, please do not hesitate to contact me.    Sincerely,    Lisa Diane RN

## 2022-02-22 NOTE — PROGRESS NOTES
"Subjective:       Patient ID: Russel Hill is a 46 y.o. male.    Chief Complaint: COVID-19 Concerns (No symptoms. Exposed by cousin)    HPI  Review of Systems   Constitutional: Negative.    HENT: Negative.    Respiratory: Negative.    Cardiovascular: Negative.    Gastrointestinal: Negative.    Musculoskeletal: Negative.    Neurological: Negative.           Reviewed family, medical, surgical, and social history.    Objective:      BP (!) 133/94   Pulse 81   Temp 98.7 °F (37.1 °C)   Ht 5' 7" (1.702 m)   Wt 113.9 kg (251 lb)   SpO2 97%   BMI 39.31 kg/m²   Physical Exam  Vitals and nursing note reviewed.   Constitutional:       General: He is not in acute distress.     Appearance: Normal appearance. He is not ill-appearing or diaphoretic.   Cardiovascular:      Rate and Rhythm: Normal rate and regular rhythm.      Heart sounds: Normal heart sounds.   Pulmonary:      Effort: Pulmonary effort is normal.      Breath sounds: Normal breath sounds.   Skin:     General: Skin is warm and dry.   Neurological:      Mental Status: He is alert and oriented to person, place, and time.   Psychiatric:         Mood and Affect: Mood normal.         Behavior: Behavior normal.         Thought Content: Thought content normal.         Judgment: Judgment normal.            Office Visit on 02/22/2022   Component Date Value Ref Range Status    SARS Coronavirus 2 Antigen 02/22/2022 Negative  Negative Final     Acceptable 02/22/2022 Yes   Final      Assessment:       1. Encounter for screening laboratory testing for COVID-19 virus        Plan:       Encounter for screening laboratory testing for COVID-19 virus  -     SARS Coronavirus 2 Antigen, POCT    Copy of result given (neg rapid covid)  Retest with any new s/s  RTC PRN          Risks, benefits, and side effects were discussed with the patient. All questions were answered to the fullest satisfaction of the patient, and pt verbalized understanding and agreement " to treatment plan. Pt was to call with any new or worsening symptoms, or present to the ER.

## 2022-03-15 ENCOUNTER — OFFICE VISIT (OUTPATIENT)
Dept: FAMILY MEDICINE | Facility: CLINIC | Age: 47
End: 2022-03-15
Payer: COMMERCIAL

## 2022-03-15 VITALS
HEIGHT: 67 IN | SYSTOLIC BLOOD PRESSURE: 106 MMHG | BODY MASS INDEX: 37.64 KG/M2 | OXYGEN SATURATION: 97 % | WEIGHT: 239.81 LBS | HEART RATE: 79 BPM | RESPIRATION RATE: 20 BRPM | DIASTOLIC BLOOD PRESSURE: 66 MMHG | TEMPERATURE: 97 F

## 2022-03-15 DIAGNOSIS — Z12.11 SCREEN FOR COLON CANCER: ICD-10-CM

## 2022-03-15 DIAGNOSIS — I10 ESSENTIAL HYPERTENSION: ICD-10-CM

## 2022-03-15 DIAGNOSIS — I10 PRIMARY HYPERTENSION: ICD-10-CM

## 2022-03-15 DIAGNOSIS — E11.65 TYPE 2 DIABETES MELLITUS WITH HYPERGLYCEMIA, WITHOUT LONG-TERM CURRENT USE OF INSULIN: Primary | ICD-10-CM

## 2022-03-15 PROCEDURE — 99213 OFFICE O/P EST LOW 20 MIN: CPT | Mod: ,,, | Performed by: NURSE PRACTITIONER

## 2022-03-15 PROCEDURE — 99213 PR OFFICE/OUTPT VISIT, EST, LEVL III, 20-29 MIN: ICD-10-PCS | Mod: ,,, | Performed by: NURSE PRACTITIONER

## 2022-03-15 RX ORDER — TRIAMTERENE AND HYDROCHLOROTHIAZIDE 37.5; 25 MG/1; MG/1
1 CAPSULE ORAL DAILY
Qty: 90 CAPSULE | Refills: 3 | Status: SHIPPED | OUTPATIENT
Start: 2022-03-15 | End: 2022-06-16 | Stop reason: SDUPTHER

## 2022-03-15 NOTE — PROGRESS NOTES
[facility] - FAMILY MEDICINE       PATIENT NAME: Russel Hill   : 1975    AGE: 46 y.o. DATE: 03/15/2022    MRN: [id number]        Reason for Visit / Chief Complaint:  Diabetes and Follow-up (Follow up on DM. Refills or samples ozempic.)     Subjective:     HPI:  Presents for f/u T2DM. Has lost 25 lbs since Sept. Was unable to get Ozempic rx but isn't sure he gave the pharmacy the right insurance card; requests samples and will check with pharmacy. No previous eye exam.     Monitoring glucose daily with levels ranging .   Lab Results   Component Value Date    HGBA1C 6.4 2022     Medications: reports his insurance told him that only his visits are covered, not meds, so he wasn't able to get Ozempic; took his last shot Friday. Ozempic has been controlling his glucose well.  Has been taking metformin often because his glucose has been good; he occasionally takes 1/2 tab if glucose is in the low 100s.    PHQ9 3/15/2022   Total Score 0       Review of Systems:     Review of Systems   Constitutional: Negative.    HENT: Negative.    Eyes: Negative.    Respiratory: Negative.    Cardiovascular: Negative.    Gastrointestinal: Negative.    Endocrine: Negative.    Genitourinary: Negative.    Musculoskeletal: Negative.    Skin: Negative.    Allergic/Immunologic: Negative.    Neurological: Negative.    Hematological: Negative.    Psychiatric/Behavioral: Negative.        Allergies and Medications:     Review of patient's allergies indicates:   Allergen Reactions    Marijuana/cannabinoid     Melon      watermellon        Current Outpatient Medications on File Prior to Visit   Medication Sig Dispense Refill    cloNIDine (CATAPRES) 0.2 MG tablet Take 1 tablet (0.2 mg total) by mouth every evening. 30 tablet 11    lisinopriL-hydrochlorothiazide (PRINZIDE,ZESTORETIC) 20-25 mg Tab Take 1 tablet by mouth once daily. 30 tablet 11    metFORMIN  (GLUCOPHAGE) 1000 MG tablet Take 1 tablet (1,000 mg total) by mouth 2 (two) times daily with meals. 60 tablet 11    NIFEdipine (ADALAT CC) 60 MG TbSR Take 1 tablet (60 mg total) by mouth once daily. 30 tablet 11    potassium chloride SA (K-DUR,KLOR-CON) 20 MEQ tablet Take 1 tablet (20 mEq total) by mouth once daily. 30 tablet 11    pravastatin (PRAVACHOL) 20 MG tablet Take 1 tablet (20 mg total) by mouth once daily. 90 tablet 3    semaglutide (OZEMPIC) 1 mg/dose (4 mg/3 mL) Inject 1 mg into the skin every 7 days. 3 pen 3    [DISCONTINUED] triamterene-hydrochlorothiazide 37.5-25 mg (DYAZIDE) 37.5-25 mg per capsule Take 1-2 capsules by mouth every morning. 30 capsule 11    [DISCONTINUED] tadalafiL (CIALIS) 20 MG Tab Take 1 tablet (20 mg total) by mouth once daily. 1/2 tab one hour before sex (Patient not taking: No sig reported) 10 tablet 11     No current facility-administered medications on file prior to visit.       Labs:       Lipids:   Lab Results   Component Value Date    CHOL 160 01/04/2022    CHOL 232 (H) 05/12/2021     Lab Results   Component Value Date    HDL 51 01/04/2022    HDL 52 05/12/2021     Lab Results   Component Value Date    LDLCALC 82 01/04/2022    LDLCALC 140 05/12/2021     Lab Results   Component Value Date    TRIG 134 01/04/2022    TRIG 199 (H) 05/12/2021     Lab Results   Component Value Date    CHOLHDL 3.1 01/04/2022    CHOLHDL 4.5 05/12/2021      Urine Ma/creat ratio:  Lab Results   Component Value Date    MICROALBUR 4.0 (H) 01/04/2022      CMP:  Sodium   Date Value Ref Range Status   01/04/2022 137 136 - 145 mmol/L Final     Potassium   Date Value Ref Range Status   01/04/2022 4.2 3.5 - 5.1 mmol/L Final     Chloride   Date Value Ref Range Status   01/04/2022 102 98 - 107 mmol/L Final     CO2   Date Value Ref Range Status   01/04/2022 27 21 - 32 mmol/L Final     Glucose   Date Value Ref Range Status   01/04/2022 136 (H) 74 - 106 mg/dL Final     BUN   Date Value Ref Range Status    01/04/2022 24 (H) 7 - 18 mg/dL Final     Creatinine   Date Value Ref Range Status   01/04/2022 1.07 0.70 - 1.30 mg/dL Final     Calcium   Date Value Ref Range Status   01/04/2022 9.3 8.5 - 10.1 mg/dL Final     Total Protein   Date Value Ref Range Status   01/04/2022 8.5 (H) 6.4 - 8.2 g/dL Final     Albumin   Date Value Ref Range Status   01/04/2022 4.0 3.5 - 5.0 g/dL Final     Bilirubin, Total   Date Value Ref Range Status   01/04/2022 0.9 0.0 - 1.2 mg/dL Final     Alk Phos   Date Value Ref Range Status   01/04/2022 82 45 - 115 U/L Final     AST   Date Value Ref Range Status   01/04/2022 20 15 - 37 U/L Final     ALT   Date Value Ref Range Status   01/04/2022 53 16 - 61 U/L Final     Anion Gap   Date Value Ref Range Status   01/04/2022 12 7 - 16 mmol/L Final     eGFR    Date Value Ref Range Status   05/12/2021 113 >=60 mL/min/1.73m² Final     eGFR   Date Value Ref Range Status   01/04/2022 79 >=60 mL/min/1.73m² Final      CBC:  Lab Results   Component Value Date    WBC 8.01 01/04/2022    RBC 5.52 01/04/2022    HGB 14.0 01/04/2022    HCT 44.6 01/04/2022    MCV 80.8 01/04/2022    MCH 25.4 (L) 01/04/2022    MCHC 31.4 (L) 01/04/2022    RDW 15.7 (H) 01/04/2022     01/04/2022    MPV 11.5 01/04/2022    LYMPH 38.7 01/04/2022    LYMPH 3.10 01/04/2022    MONO 5.5 01/04/2022    EOS 0.14 01/04/2022    BASO 0.06 01/04/2022    EOSINOPHIL 1.7 01/04/2022    BASOPHIL 0.7 01/04/2022      Lab Results   Component Value Date    TSH 0.906 01/04/2022         Medical/Social/Family History:     Past Medical History:   Diagnosis Date    Constipation     Diabetes mellitus, type 2 02/12/2014    Erectile dysfunction     Hypercholesteremia 03/04/2016    Hypertension     Severe obesity (BMI >= 40) 10/01/2012      Social History     Tobacco Use   Smoking Status Never Smoker   Smokeless Tobacco Never Used      Social History     Substance and Sexual Activity   Alcohol Use Yes    Comment: drinks some liquor one day each  "weekend       Family History   Problem Relation Age of Onset    Cancer Mother     Diabetes Mother     Hypertension Mother     Hypertension Father     Diabetes Father     Hypertension Sister     Hypertension Brother     No Known Problems Daughter     No Known Problems Son     Cancer Maternal Grandmother     Diabetes Maternal Grandmother     Hypertension Maternal Grandmother     Cancer Maternal Grandfather     Diabetes Maternal Grandfather     Hypertension Maternal Grandfather     Cancer Paternal Grandmother     Diabetes Paternal Grandmother     Hypertension Paternal Grandmother     Cancer Paternal Grandfather     Diabetes Paternal Grandfather     Hypertension Paternal Grandfather     No Known Problems Daughter     No Known Problems Daughter     No Known Problems Daughter     No Known Problems Son       History reviewed. No pertinent surgical history.     Health Maintenance:     There is no immunization history on file for this patient.   Health Maintenance Due   Topic Date Due    COVID-19 Vaccine (1) Never done    Eye Exam  Never done    HIV Screening  Never done    Colorectal Cancer Screening  Never done     Health Maintenance Topics with due status: Not Due       Topic Last Completion Date    Diabetes Urine Screening 01/04/2022    Foot Exam 01/04/2022    Lipid Panel 01/04/2022    Hemoglobin A1c 01/04/2022    Low Dose Statin 03/15/2022       Objective:      Wt Readings from Last 3 Encounters:   03/15/22 0956 108.8 kg (239 lb 12.8 oz)   02/22/22 0906 113.9 kg (251 lb)   01/04/22 0944 113.9 kg (251 lb 3.2 oz)       Vitals:    03/15/22 0956   BP: 106/66   BP Location: Right arm   Patient Position: Sitting   BP Method: Large (Manual)   Pulse: 79   Resp: 20   Temp: 97.2 °F (36.2 °C)   TempSrc: Temporal   SpO2: 97%   Weight: 108.8 kg (239 lb 12.8 oz)   Height: 5' 7" (1.702 m)     Body mass index is 37.56 kg/m².     Physical Exam:    Physical Exam  Vitals and nursing note reviewed. "   Constitutional:       Appearance: Normal appearance. He is obese.   HENT:      Head: Normocephalic.   Eyes:      Conjunctiva/sclera: Conjunctivae normal.   Neck:      Thyroid: No thyromegaly or thyroid tenderness.      Trachea: Trachea normal.   Cardiovascular:      Rate and Rhythm: Normal rate and regular rhythm.      Pulses: Normal pulses.      Heart sounds: Normal heart sounds.   Pulmonary:      Effort: Pulmonary effort is normal.      Breath sounds: Normal breath sounds.   Musculoskeletal:      Cervical back: Neck supple.      Right lower leg: No edema.      Left lower leg: No edema.   Lymphadenopathy:      Cervical: No cervical adenopathy.   Skin:     General: Skin is warm and dry.   Neurological:      Mental Status: He is alert and oriented to person, place, and time.   Psychiatric:         Mood and Affect: Mood normal.         Behavior: Behavior normal.          Assessment:          ICD-10-CM ICD-9-CM   1. Type 2 diabetes mellitus with hyperglycemia, without long-term current use of insulin  E11.65 250.00     790.29   2. Primary hypertension  I10 401.9   3. Screen for colon cancer  Z12.11 V76.51   4. Essential hypertension  I10 401.9        Plan:       Type 2 diabetes mellitus with hyperglycemia, without long-term current use of insulin    Primary hypertension    Screen for colon cancer  -     Ambulatory referral/consult to Gastroenterology; Future; Expected date: 03/22/2022    Essential hypertension  -     triamterene-hydrochlorothiazide 37.5-25 mg (DYAZIDE) 37.5-25 mg per capsule; Take 1 capsule by mouth once daily.  Dispense: 90 capsule; Refill: 3        Current Outpatient Medications:     cloNIDine (CATAPRES) 0.2 MG tablet, Take 1 tablet (0.2 mg total) by mouth every evening., Disp: 30 tablet, Rfl: 11    lisinopriL-hydrochlorothiazide (PRINZIDE,ZESTORETIC) 20-25 mg Tab, Take 1 tablet by mouth once daily., Disp: 30 tablet, Rfl: 11    metFORMIN (GLUCOPHAGE) 1000 MG tablet, Take 1 tablet (1,000 mg  total) by mouth 2 (two) times daily with meals., Disp: 60 tablet, Rfl: 11    NIFEdipine (ADALAT CC) 60 MG TbSR, Take 1 tablet (60 mg total) by mouth once daily., Disp: 30 tablet, Rfl: 11    potassium chloride SA (K-DUR,KLOR-CON) 20 MEQ tablet, Take 1 tablet (20 mEq total) by mouth once daily., Disp: 30 tablet, Rfl: 11    pravastatin (PRAVACHOL) 20 MG tablet, Take 1 tablet (20 mg total) by mouth once daily., Disp: 90 tablet, Rfl: 3    semaglutide (OZEMPIC) 1 mg/dose (4 mg/3 mL), Inject 1 mg into the skin every 7 days., Disp: 3 pen, Rfl: 3    triamterene-hydrochlorothiazide 37.5-25 mg (DYAZIDE) 37.5-25 mg per capsule, Take 1 capsule by mouth once daily., Disp: 90 capsule, Rfl: 3      New & refilled meds:  Requested Prescriptions     Signed Prescriptions Disp Refills    triamterene-hydrochlorothiazide 37.5-25 mg (DYAZIDE) 37.5-25 mg per capsule 90 capsule 3     Sig: Take 1 capsule by mouth once daily.     Declines vaccines.  Too soon to recheck A1c.  Sample box of Ozempic to continue 0.5 mg weekly until he is able to find out about insurance med coverage. He is to call back and let us know. (he called back and said Ozempic is covered)  Encouraged to continue lifestyle changes, exercise regularly, and continue to lose weight.   Schedule diabetic eye exam.    F/u 3 mths for T2DM with nonfasting labs; and f/u as needed.    Future Appointments   Date Time Provider Department Center   4/5/2022  8:[facility]   6/16/2022  9:20 AM MICAELA Lowry [facility] ZULY Torres        [facility]    Date of encounter: 3/15/22

## 2022-03-15 NOTE — PATIENT INSTRUCTIONS
"Patient Education       Type 2 Diabetes   The Basics   Written by the doctors and editors at [facility]   What is type 2 diabetes? -- Type 2 diabetes is a disorder that disrupts the way your body uses sugar. It is sometimes called type 2 diabetes mellitus.  All the cells in your body need sugar to work normally. Sugar gets into the cells with the help of a hormone called insulin. Insulin is made by the pancreas, an organ in the belly. If there is not enough insulin, or if your body stops responding to insulin, sugar builds up in the blood. That is what happens to people with diabetes.  There are two different types of diabetes:   Type 1 diabetes - In type 1 diabetes, the pancreas does not make insulin or makes very little insulin.  Type 2 diabetes - In most people with type 2 diabetes, the body stops responding to insulin normally. Then, over time, the pancreas stops making enough insulin.   Being overweight or obese increases a person's risk of developing type 2 diabetes. But people who are not overweight can get diabetes, too.  What are the symptoms of type 2 diabetes? -- Type 2 diabetes usually causes no symptoms. When symptoms do occur, they include:  Needing to urinate often  Intense thirst  Blurry vision  Can diabetes lead to other health problems? -- Yes. Type 2 diabetes might not make you feel sick. But if it is not managed, it can lead to serious problems over time, such as:  Heart attacks  Strokes  Kidney disease  Vision problems (or even blindness)  Pain or loss of feeling in the hands and feet  Needing to have fingers, toes, or other body parts removed (amputated)  How do I know if I have type 2 diabetes? -- To find out if you have type 2 diabetes, your doctor or nurse can do a blood test. There are 2 tests that can be used for this. Both involve measuring the amount of sugar in your blood, called your "blood sugar" or "blood glucose":   One of the tests measures your blood sugar at the time the blood " "sample is taken. This test is done in the morning. You can't eat or drink anything except water for at least 8 hours before the test.   The other test shows what your average blood sugar has been for the past 2 to 3 months. This blood test is called "hemoglobin A1C" or just "A1C." It can be checked at any time of the day, even if you have recently eaten.  How is type 2 diabetes treated? -- The goals of treatment are to control your blood sugar and lower the risk of future problems that can happen in people with diabetes. An important part of managing diabetes is to eat healthy foods and get plenty of physical activity.  There are a few medicines that help control blood sugar. Some people need to take pills that help the body make more insulin or that help insulin do its job. Others need insulin shots.  Depending on what medicines you take, you might need to check your blood sugar regularly at home. But not everyone with type 2 diabetes needs to do this. Your doctor or nurse will tell you if you should be checking your blood sugar, and when and how to do this.  Sometimes, people with type 2 diabetes also need medicines to reduce the problems caused by the disease. For instance, medicines used to lower blood pressure can reduce the chances of a heart attack or stroke.  It's also important to get certain vaccines, such as vaccines to protect against the flu and coronavirus disease 2019 (COVID-19). Some people also need a vaccine to prevent pneumonia.  Can type 2 diabetes be prevented? -- Yes. To lower your chances of getting type 2 diabetes, the most important thing you can do is eat a healthy diet and get plenty of physical activity. This can help you lose weight if you are overweight. But eating well and being active are also good for your overall health. Even gentle activity, like walking, has benefits.  If you smoke, quitting can also lower your risk of type 2 diabetes. Quitting smoking can be hard to do, but your " doctor or nurse can help.  All topics are updated as new evidence becomes available and our peer review process is complete.  This topic retrieved from [employer] on: Sep 21, 2021.  Topic 42887 Version 14.0  Release: 29.4.2 - C29.263  © 2021 UpToDate, Inc. and/or its affiliates. All rights reserved.  Consumer Information Use and Disclaimer   This information is not specific medical advice and does not replace information you receive from your health care provider. This is only a brief summary of general information. It does NOT include all information about conditions, illnesses, injuries, tests, procedures, treatments, therapies, discharge instructions or life-style choices that may apply to you. You must talk with your health care provider for complete information about your health and treatment options. This information should not be used to decide whether or not to accept your health care provider's advice, instructions or recommendations. Only your health care provider has the knowledge and training to provide advice that is right for you. The use of this information is governed by the [employer] End User License Agreement, available at https://www.KeepTruckin/en/solutions/Spontacts/about/marly.The use of [employer] content is governed by the [employer] Terms of Use. ©2021 UpToDate, Inc. All rights reserved.  Copyright   © 2021 UpToDate, Inc. and/or its affiliates. All rights reserved.  Patient Education       Diabetes and Diet   The Basics   Written by the doctors and editors at [facility]   Why is diet important in diabetes? -- Diet is important because it is part of diabetes treatment. Many people need to change what they eat and how much they eat to help treat their diabetes. It is important for people to treat their diabetes so that they:  Keep their blood sugar at or near a normal level  Prevent long-term problems, such as heart or kidney problems, that can happen in people with diabetes  Changing your diet can  "also help treat obesity, high blood pressure, and high cholesterol. These conditions can affect people with diabetes and can lead to future problems, such as heart attacks or strokes.  Who will work with me to change my diet? -- Your doctor or nurse will work with you to make a food plan to change your diet. They might also recommend that you work with a "dietitian." A dietitian is an expert on food and eating.  Do I need to eat at the same times every day? -- When and how often you should eat depends, in part, on the diabetes medicines you take. For example:  People who take about the same amount of insulin at the same time each day (called a "fixed regimen") should eat meals at the same times. This is also true for people who take pills that increase insulin levels, such as sulfonylureas. Eating meals at the same time every day helps prevent low blood sugar.  People who adjust the dose and timing of their insulin each day (called a "flexible regimen") do not always have to eat meals at the same time. That's because they can time their insulin dose for before they plan to eat, and also adjust the dose for how much they plan to eat.  People who take medicines that don't usually cause low blood sugar, such as metformin, don't have to eat meals at the same time every day.  What do I need to think about when planning what to eat? -- Our bodies break down the food we eat into small pieces called carbohydrates, proteins, and fats.  When planning what to eat, people with diabetes need to think about:  Carbohydrates (or "carbs") - Carbohydrates, which are sugars that our bodies use for energy, can raise a person's blood sugar level. Your doctor, nurse, or dietitian will tell you how many carbohydrates you should eat at each meal or snack. Foods that have carbohydrates include:  Bread, pasta, and rice  Vegetables and fruits  Dairy foods  Foods and drinks with added sugar  It is best to get your carbohydrates from fruits, " "vegetables, whole grains, and low-fat milk. It is best to avoid drinks with added sugar, like soda, juices, and sports drinks.   Protein - Your doctor, nurse, or dietitian will tell you how much protein you should eat each day. It is best to eat lean meats, fish, eggs, beans, peas, soy products, nuts, and seeds.  Fats - The type of fat you eat is more important than the amount of fat. "Saturated" and "trans" fats can increase your risk for heart problems, like a heart attack.  Foods that have saturated fats include meat, butter, cheese, and ice cream.  Foods that have trans fats include processed food with "partially hydrogenated oils" on the ingredient list. This may include fried foods, store bought cookies, muffins, pies, and cakes.  "Monounsaturated" and "polyunsaturated" fats are better for you. Foods with these types of fat include fish, avocado, olive oil, and nuts.  Calories - People need to eat a certain amount of calories each day to keep their weight the same. People who are overweight and want to lose weight need to eat fewer calories each day.  Fiber - Eating foods with a lot of fiber can help control a person's blood sugar level. Foods that have a lot of fiber include apples, green beans, peas, beans, lentils, nuts, oatmeal, and whole grains.  Salt - People who have high blood pressure should not eat foods that contain a lot of salt (also called sodium). People with high blood pressure should also eat healthy foods, such as fruits, vegetables, and low-fat dairy foods.  Alcohol - Having more than 1 drink (for women) or 2 drinks (for men) a day can raise blood sugar levels. Also, drinks that have fruit juice or soda in them can raise blood sugar levels.  What can I do if I need to lose weight? -- If you need to lose weight, you can:  Exercise - Try to get at least 30 minutes of physical activity a day, most days of the week. Even gentle exercise, like walking, is good for your health. Some people with " diabetes need to change their medicine dose before they exercise. They might also need to check their blood sugar levels before and after exercising.  Eat fewer calories - Your doctor, nurse, or dietitian can tell you how many calories you should eat each day in order to lose weight.  If you are worried about your weight, size, or shape, talk with your doctor, nurse, or dietitian. They can help you make changes to improve your health.  Can I eat the same foods as my family? -- Yes. You do not need to eat special foods if you have diabetes. You and your family can eat the same foods. Changing your diet is mostly about eating healthy foods and not eating too much.  What are the other parts of diabetes treatment? -- Besides changing your diet, the other parts of diabetes treatment are:  Exercise  Medicines  Some people with diabetes need to learn how to match their diet and exercise with their medicine dose. For example, people who use insulin might need to choose the dose of insulin they give themselves. To choose their dose, they need to think about:  What they plan to eat at the next meal  How much exercise they plan to do  What their blood sugar level is  If the diet and exercise do not match the medicine dose, a person's blood sugar level can get too low or too high. Blood sugar levels that are too low or too high can cause problems.  All topics are updated as new evidence becomes available and our peer review process is complete.  This topic retrieved from [employer] on: Sep 21, 2021.  Topic 40361 Version 7.0  Release: 29.4.2 - C29.263  © 2021 UpToDate, Inc. and/or its affiliates. All rights reserved.  Consumer Information Use and Disclaimer   This information is not specific medical advice and does not replace information you receive from your health care provider. This is only a brief summary of general information. It does NOT include all information about conditions, illnesses, injuries, tests, procedures,  treatments, therapies, discharge instructions or life-style choices that may apply to you. You must talk with your health care provider for complete information about your health and treatment options. This information should not be used to decide whether or not to accept your health care provider's advice, instructions or recommendations. Only your health care provider has the knowledge and training to provide advice that is right for you. The use of this information is governed by the [employer] End User License Agreement, available at https://www.Aerospike/en/solutions/[employer]/about/marly.The use of [employer] content is governed by the [employer] Terms of Use. ©2021 UpToDate, Inc. All rights reserved.  Copyright   © 2021 UpToDate, Inc. and/or its affiliates. All rights reserved.  Patient Education       Weight Loss Tips   About this topic   More and more people are concerned about their weight. You can choose from many different programs. The goal of a weight loss program may be to cut down on calories or to lose extra weight through exercise.  Losing weight may mean changing your ideas about food. Going on a diet and losing weight does not mean starving yourself. It means cutting down on the amount of food you eat, making healthy food choices, and being active.  General   Ideally, you need to lose 1 to 2 pounds (0.5 to 1 kg) a week for a healthy weight loss. Losing too much weight too fast is not good. When you take in fewer calories, you will lose weight. Your ideal calorie and weight goal depends on your current age, weight, height, and personal goals. Ask your doctor or dietitian what your ideal weight is.  You need to burn 3500 calories to lose 1 pound (0.5 kg). That means cutting out 500 calories every day for 7 days. You can cut out 500 calories per day by eating or drinking fewer calories, burning them through exercise, or doing both. To lose that extra weight and stay healthy:  Take time to  "exercise.  Exercise regularly. Burn calories with activity and exercise. Exercise can help you lose weight and it also strengthens your muscles. Set a schedule where you will have time to do exercises. With just 30 to 60 minutes of exercise each day, you could burn 500 extra calories. Your metabolism stays elevated for a period of time after exercise.  If you don't have time for a 30 minute workout, try three 10 minute exercises each day.  If you work near your home, walk to work. Walking is a very good form of exercise.  Take a 20 minute walk each day. Walk during your lunch break. Park far away, so you have to walk more.  Take the steps instead of elevators. You will burn more calories this way.  If you have an illness, like diabetes or high blood pressure, ask your doctor how much exercise is right for you.  Choose healthy snacks.  Low calorie healthy snacks are a good thing. They help your blood sugar stay even and prevent you from overeating at meals. Choose a balanced snack, such as a small apple with 2 tablespoons (30 grams) of peanut butter.  Keep in mind, even "low calorie" foods can add up. Just because you choose low calorie foods does not mean you do not have to count the calories you eat.  Pack a few fresh fruits or a small salad to take to work or school. Avoid buying a snack at the nearest vending machine.  When you feel thirsty, drink water. Water has no calories and is a very good thirst quencher.  Plan healthy meals.  Plan ahead. Keep a diary of foods that are low in calories. You can also make a list of meal plans for your breakfast, lunch, and dinner. Planning ahead will prevent you from eating out at a fast food place or restaurant.  Make a grocery list before shopping so you only buy food you need. Don't go to the store hungry.  Visit a dietitian. This person will help you make meal plans that will help you lose weight.  Add fiber to your meals. Adding fiber helps you to feel full for a longer " amount of time.  Take care when eating out.  Choose lower fat and lower calorie meals. Try a seafood, lean meat, or vegetarian entrée.  Share a meal with a friend.  Try a salad and appetizer instead of an entree.  Ask for a to-go box when dinner is served and put half of your meal in it for a later meal.  Have fruit for dessert.  Drink water instead of other high calorie drinks.  Learn not to overeat.  Watch your portions. For example, the recommended serving size of meat is 3 ounces (90 grams). This is the size of a deck of playing cards. Two tablespoons (30 grams) of peanut butter is the size of a ping-pong ball. One medium fruit is the size of a baseball.  Use a smaller plate or glass during dinner for less calorie intake.  Try drinking a glass or two of water before eating. This may make you feel more full and help you to eat less.  Eat slowly. Take at least 30 minutes to eat. This gives time for your brain to tell your stomach you are full. This will help you avoid overeating.  Some people eat smaller meals more often to help not to overeat. If you can eat six small meals, make them healthy and low calorie. If three meals are best for you, know your calorie level for the day and spread it out into three healthy low calorie meals.     What will the results be?   Losing weight may make you healthier. You also may have more energy for your daily activities. You may lower disease risk. You may also add years to your life.  What changes to diet are needed?   Learn how to read nutrition labels. Know the serving size. Knowing the calories in an item will help you make healthier choices and lose weight.  Keep a diary of the food you eat. This will help you count the calories you are taking in.  Make a menu in advance. This will help you make good choices to include in your diet.  Avoid eating 2 hours before bedtime to allow for digestion. If you eat right before you go to bed, you may also have worse heartburn.  Be  sure to count the calories in the things you drink. You may want to stop drinking soda pop, beer, wine, and mixed drinks (alcohol). Some coffee drinks also have a lot of calories in them.  Who should use this diet?   A weight loss diet may be needed for people with a calculated body mass index of 25 and over. This means you are overweight or obese.  Who should not use this diet?   People with BMI of 18.5 or lower should not use this diet. Do not use this diet if your doctor does not recommend weight loss.  What foods are good to eat?   Choose foods that are nutritious. Remember, portion control is key. Even a low calorie food can become high in calories if you have too big of a serving. Here is a list of foods that are good to eat:  Vegetables:   Broccoli  Asparagus  Spinach  Green leafy vegetables  Tomatoes  Onions  Mushrooms  Cucumbers  Zucchini  Lean proteins:   Egg whites  Beans including kidney, navy, black, and chickpeas  Grilled, broiled, or baked skinless chicken breast  Grilled, broiled, or baked skinless turkey breast  Lean beef  [place] meat  Grilled, broiled, or baked fish  Beans  Nuts, such as almonds, cashews, and pistachios  Seeds  Whole grains and carbs:   Oatmeal  Brown rice  Sweet potatoes  Cereal  Whole grain bread or pasta  Fruits:   Apples  Grapefruit  Blueberries  Oranges  Bananas  Grapes  Peaches  Pineapple  Strawberries  Dairy:   Fat-free or low-fat milk and cheese  Low fat yogurt  Soy, rice, or almond milk  What foods should be limited or avoided?   Limit or avoid foods that are high in calories like:  Junk foods  Fried foods  Fatty foods  Processed meats  Food with saturated and trans fat  Whole fat dairy products  Butter  Cheese  Ice cream  Food and drinks with a lot of sugar. Some examples are beer, wine, mixed drinks (alcohol), carbonated sodas, cakes, and cookies.  When do I need to call the doctor?   Weakness  Fast heartbeat  Dizziness  Helpful tips   Join a support group and an  exercise group. It is much easier to lose weight if you have support and encouragement.  Do not skip meals. If you skip a meal, you most likely will overeat at that next meal.  Eat at the dining room table instead in front of the TV to help monitor intake.  Where can I learn more?   Academy of Nutrition and Dietetics  https://www.eatright.org/health/weight-loss/your-health-and-your-weight/back-to-basics-for-healthy-weight-loss   Centers for Disease Control and Prevention  https://www.cdc.gov/healthyweight/   Weight-Control Information Network  https://www.niddk.nih.gov/health-information/diet-nutrition/changing-habits-better-health   Last Reviewed Date   2021-08-09  Consumer Information Use and Disclaimer   This information is not specific medical advice and does not replace information you receive from your health care provider. This is only a brief summary of general information. It does NOT include all information about conditions, illnesses, injuries, tests, procedures, treatments, therapies, discharge instructions or life-style choices that may apply to you. You must talk with your health care provider for complete information about your health and treatment options. This information should not be used to decide whether or not to accept your health care providers advice, instructions or recommendations. Only your health care provider has the knowledge and training to provide advice that is right for you.  Copyright   Copyright © 2021 UpToDate, Inc. and its affiliates and/or licensors. All rights reserved.

## 2022-06-16 ENCOUNTER — OFFICE VISIT (OUTPATIENT)
Dept: FAMILY MEDICINE | Facility: CLINIC | Age: 47
End: 2022-06-16
Payer: COMMERCIAL

## 2022-06-16 VITALS
HEART RATE: 94 BPM | BODY MASS INDEX: 34.25 KG/M2 | HEIGHT: 67 IN | WEIGHT: 218.19 LBS | RESPIRATION RATE: 18 BRPM | TEMPERATURE: 98 F | SYSTOLIC BLOOD PRESSURE: 140 MMHG | OXYGEN SATURATION: 97 % | DIASTOLIC BLOOD PRESSURE: 88 MMHG

## 2022-06-16 DIAGNOSIS — I10 ESSENTIAL HYPERTENSION: ICD-10-CM

## 2022-06-16 DIAGNOSIS — Z13.220 SCREENING FOR HYPERLIPIDEMIA: ICD-10-CM

## 2022-06-16 DIAGNOSIS — E78.2 MIXED HYPERLIPIDEMIA: ICD-10-CM

## 2022-06-16 DIAGNOSIS — E11.9 TYPE 2 DIABETES MELLITUS WITHOUT COMPLICATION, WITHOUT LONG-TERM CURRENT USE OF INSULIN: ICD-10-CM

## 2022-06-16 DIAGNOSIS — Z12.11 COLON CANCER SCREENING: ICD-10-CM

## 2022-06-16 DIAGNOSIS — Z12.5 PROSTATE CANCER SCREENING: ICD-10-CM

## 2022-06-16 DIAGNOSIS — E11.9 CONTROLLED TYPE 2 DIABETES MELLITUS WITHOUT COMPLICATION, WITHOUT LONG-TERM CURRENT USE OF INSULIN: ICD-10-CM

## 2022-06-16 DIAGNOSIS — E11.65 TYPE 2 DIABETES MELLITUS WITH HYPERGLYCEMIA, WITHOUT LONG-TERM CURRENT USE OF INSULIN: ICD-10-CM

## 2022-06-16 DIAGNOSIS — E78.00 HYPERCHOLESTEREMIA: ICD-10-CM

## 2022-06-16 DIAGNOSIS — Z00.00 ROUTINE GENERAL MEDICAL EXAMINATION AT A HEALTH CARE FACILITY: Primary | ICD-10-CM

## 2022-06-16 DIAGNOSIS — Z13.1 DIABETES MELLITUS SCREENING: ICD-10-CM

## 2022-06-16 DIAGNOSIS — I10 PRIMARY HYPERTENSION: ICD-10-CM

## 2022-06-16 DIAGNOSIS — Z79.899 ENCOUNTER FOR LONG-TERM (CURRENT) USE OF OTHER MEDICATIONS: ICD-10-CM

## 2022-06-16 LAB
ALBUMIN SERPL BCP-MCNC: 4.4 G/DL (ref 3.5–5)
ALBUMIN/GLOB SERPL: 1 {RATIO}
ALP SERPL-CCNC: 69 U/L (ref 45–115)
ALT SERPL W P-5'-P-CCNC: 52 U/L (ref 16–61)
ANION GAP SERPL CALCULATED.3IONS-SCNC: 12 MMOL/L (ref 7–16)
AST SERPL W P-5'-P-CCNC: 28 U/L (ref 15–37)
BILIRUB SERPL-MCNC: 1.1 MG/DL (ref 0–1.2)
BUN SERPL-MCNC: 32 MG/DL (ref 7–18)
BUN/CREAT SERPL: 27 (ref 6–20)
CALCIUM SERPL-MCNC: 10.5 MG/DL (ref 8.5–10.1)
CHLORIDE SERPL-SCNC: 103 MMOL/L (ref 98–107)
CHOLEST SERPL-MCNC: 163 MG/DL (ref 0–200)
CHOLEST/HDLC SERPL: 2.5 {RATIO}
CO2 SERPL-SCNC: 29 MMOL/L (ref 21–32)
CREAT SERPL-MCNC: 1.2 MG/DL (ref 0.7–1.3)
EST. AVERAGE GLUCOSE BLD GHB EST-MCNC: 104 MG/DL
GLOBULIN SER-MCNC: 4.4 G/DL (ref 2–4)
GLUCOSE SERPL-MCNC: 94 MG/DL (ref 74–106)
HBA1C MFR BLD HPLC: 5.7 % (ref 4.5–6.6)
HDLC SERPL-MCNC: 65 MG/DL (ref 40–60)
LDLC SERPL CALC-MCNC: 87 MG/DL
LDLC/HDLC SERPL: 1.3 {RATIO}
NONHDLC SERPL-MCNC: 98 MG/DL
POTASSIUM SERPL-SCNC: 3.8 MMOL/L (ref 3.5–5.1)
PROT SERPL-MCNC: 8.8 G/DL (ref 6.4–8.2)
PSA SERPL-MCNC: 0.46 NG/ML (ref 0–2)
SODIUM SERPL-SCNC: 140 MMOL/L (ref 136–145)
TRIGL SERPL-MCNC: 55 MG/DL (ref 35–150)
VLDLC SERPL-MCNC: 11 MG/DL

## 2022-06-16 PROCEDURE — 3060F POS MICROALBUMINURIA REV: CPT | Mod: ,,, | Performed by: NURSE PRACTITIONER

## 2022-06-16 PROCEDURE — 1160F PR REVIEW ALL MEDS BY PRESCRIBER/CLIN PHARMACIST DOCUMENTED: ICD-10-PCS | Mod: ,,, | Performed by: NURSE PRACTITIONER

## 2022-06-16 PROCEDURE — 80061 LIPID PANEL: ICD-10-PCS | Mod: ,,, | Performed by: CLINICAL MEDICAL LABORATORY

## 2022-06-16 PROCEDURE — 80053 COMPREHENSIVE METABOLIC PANEL: ICD-10-PCS | Mod: ,,, | Performed by: CLINICAL MEDICAL LABORATORY

## 2022-06-16 PROCEDURE — 3008F BODY MASS INDEX DOCD: CPT | Mod: ,,, | Performed by: NURSE PRACTITIONER

## 2022-06-16 PROCEDURE — 1159F MED LIST DOCD IN RCRD: CPT | Mod: ,,, | Performed by: NURSE PRACTITIONER

## 2022-06-16 PROCEDURE — 3060F PR POS MICROALBUMINURIA RESULT DOCUMENTED/REVIEW: ICD-10-PCS | Mod: ,,, | Performed by: NURSE PRACTITIONER

## 2022-06-16 PROCEDURE — 4010F ACE/ARB THERAPY RXD/TAKEN: CPT | Mod: ,,, | Performed by: NURSE PRACTITIONER

## 2022-06-16 PROCEDURE — 3044F HG A1C LEVEL LT 7.0%: CPT | Mod: ,,, | Performed by: NURSE PRACTITIONER

## 2022-06-16 PROCEDURE — 1160F RVW MEDS BY RX/DR IN RCRD: CPT | Mod: ,,, | Performed by: NURSE PRACTITIONER

## 2022-06-16 PROCEDURE — 4010F PR ACE/ARB THEARPY RXD/TAKEN: ICD-10-PCS | Mod: ,,, | Performed by: NURSE PRACTITIONER

## 2022-06-16 PROCEDURE — 99396 PREV VISIT EST AGE 40-64: CPT | Mod: ,,, | Performed by: NURSE PRACTITIONER

## 2022-06-16 PROCEDURE — 3066F PR DOCUMENTATION OF TREATMENT FOR NEPHROPATHY: ICD-10-PCS | Mod: ,,, | Performed by: NURSE PRACTITIONER

## 2022-06-16 PROCEDURE — 3077F SYST BP >= 140 MM HG: CPT | Mod: ,,, | Performed by: NURSE PRACTITIONER

## 2022-06-16 PROCEDURE — 80061 LIPID PANEL: CPT | Mod: ,,, | Performed by: CLINICAL MEDICAL LABORATORY

## 2022-06-16 PROCEDURE — 3008F PR BODY MASS INDEX (BMI) DOCUMENTED: ICD-10-PCS | Mod: ,,, | Performed by: NURSE PRACTITIONER

## 2022-06-16 PROCEDURE — 83036 HEMOGLOBIN A1C: ICD-10-PCS | Mod: ,,, | Performed by: CLINICAL MEDICAL LABORATORY

## 2022-06-16 PROCEDURE — 80053 COMPREHEN METABOLIC PANEL: CPT | Mod: ,,, | Performed by: CLINICAL MEDICAL LABORATORY

## 2022-06-16 PROCEDURE — 99396 PR PREVENTIVE VISIT,EST,40-64: ICD-10-PCS | Mod: ,,, | Performed by: NURSE PRACTITIONER

## 2022-06-16 PROCEDURE — 1159F PR MEDICATION LIST DOCUMENTED IN MEDICAL RECORD: ICD-10-PCS | Mod: ,,, | Performed by: NURSE PRACTITIONER

## 2022-06-16 PROCEDURE — 3066F NEPHROPATHY DOC TX: CPT | Mod: ,,, | Performed by: NURSE PRACTITIONER

## 2022-06-16 PROCEDURE — 3044F PR MOST RECENT HEMOGLOBIN A1C LEVEL <7.0%: ICD-10-PCS | Mod: ,,, | Performed by: NURSE PRACTITIONER

## 2022-06-16 PROCEDURE — 3079F PR MOST RECENT DIASTOLIC BLOOD PRESSURE 80-89 MM HG: ICD-10-PCS | Mod: ,,, | Performed by: NURSE PRACTITIONER

## 2022-06-16 PROCEDURE — 3077F PR MOST RECENT SYSTOLIC BLOOD PRESSURE >= 140 MM HG: ICD-10-PCS | Mod: ,,, | Performed by: NURSE PRACTITIONER

## 2022-06-16 PROCEDURE — 3079F DIAST BP 80-89 MM HG: CPT | Mod: ,,, | Performed by: NURSE PRACTITIONER

## 2022-06-16 PROCEDURE — 83036 HEMOGLOBIN GLYCOSYLATED A1C: CPT | Mod: ,,, | Performed by: CLINICAL MEDICAL LABORATORY

## 2022-06-16 PROCEDURE — G0103 PSA SCREENING: HCPCS | Mod: ,,, | Performed by: CLINICAL MEDICAL LABORATORY

## 2022-06-16 PROCEDURE — G0103 PSA, SCREENING: ICD-10-PCS | Mod: ,,, | Performed by: CLINICAL MEDICAL LABORATORY

## 2022-06-16 RX ORDER — LISINOPRIL AND HYDROCHLOROTHIAZIDE 20; 25 MG/1; MG/1
1 TABLET ORAL DAILY
Qty: 90 TABLET | Refills: 3 | Status: SHIPPED | OUTPATIENT
Start: 2022-06-16 | End: 2023-06-19 | Stop reason: ALTCHOICE

## 2022-06-16 RX ORDER — CLONIDINE HYDROCHLORIDE 0.2 MG/1
0.2 TABLET ORAL NIGHTLY
Qty: 90 TABLET | Refills: 3 | Status: SHIPPED | OUTPATIENT
Start: 2022-06-16 | End: 2023-06-19 | Stop reason: SDUPTHER

## 2022-06-16 RX ORDER — PRAVASTATIN SODIUM 20 MG/1
20 TABLET ORAL DAILY
Qty: 90 TABLET | Refills: 3 | Status: SHIPPED | OUTPATIENT
Start: 2022-06-16 | End: 2023-01-23 | Stop reason: SDUPTHER

## 2022-06-16 RX ORDER — TRIAMTERENE AND HYDROCHLOROTHIAZIDE 37.5; 25 MG/1; MG/1
1 CAPSULE ORAL DAILY
Qty: 90 CAPSULE | Refills: 3 | Status: SHIPPED | OUTPATIENT
Start: 2022-06-16 | End: 2023-06-19 | Stop reason: SDUPTHER

## 2022-06-16 RX ORDER — SEMAGLUTIDE 1.34 MG/ML
1 INJECTION, SOLUTION SUBCUTANEOUS
Qty: 3 PEN | Refills: 3 | Status: SHIPPED | OUTPATIENT
Start: 2022-06-16 | End: 2022-10-18 | Stop reason: SDUPTHER

## 2022-06-16 RX ORDER — NIFEDIPINE 60 MG/1
60 TABLET, EXTENDED RELEASE ORAL DAILY
Qty: 90 TABLET | Refills: 3 | Status: SHIPPED | OUTPATIENT
Start: 2022-06-16 | End: 2023-06-19 | Stop reason: SDUPTHER

## 2022-06-16 NOTE — PROGRESS NOTES
[facility] - FAMILY MEDICINE       PATIENT NAME: Russel Hill   : 1975    AGE: 46 y.o. DATE: 2022    MRN: [id number]        Reason for Visit / Chief Complaint:  Annual Exam (Pt presents for Healthy You)     Subjective:     HPI:  Annual Exam (Pt presents for Healthy You)     Only taking metformin in am & at times only takes 1/2 tab, holds it if in 90s.  Checking FPG dly    Working out; has lost 21 lbs in past 3 mths.    PHQ9 3/15/2022   Total Score 0         Review of Systems:     Review of Systems   Constitutional: Negative.    HENT: Negative.    Eyes: Negative.    Respiratory: Negative.    Cardiovascular: Negative.    Gastrointestinal: Negative.    Endocrine: Negative.    Genitourinary: Negative.    Musculoskeletal: Negative.    Skin: Negative.    Allergic/Immunologic: Negative.    Neurological: Negative.    Hematological: Negative.    Psychiatric/Behavioral: Negative.        Allergies and Medications:     Review of patient's allergies indicates:   Allergen Reactions    Marijuana/cannabinoid     Melon      watermellon        Current Outpatient Medications on File Prior to Visit   Medication Sig Dispense Refill    metFORMIN (GLUCOPHAGE) 1000 MG tablet Take 1 tablet (1,000 mg total) by mouth 2 (two) times daily with meals. 60 tablet 11    [DISCONTINUED] cloNIDine (CATAPRES) 0.2 MG tablet Take 1 tablet (0.2 mg total) by mouth every evening. 30 tablet 11    [DISCONTINUED] lisinopriL-hydrochlorothiazide (PRINZIDE,ZESTORETIC) 20-25 mg Tab Take 1 tablet by mouth once daily. 30 tablet 11    [DISCONTINUED] NIFEdipine (ADALAT CC) 60 MG TbSR Take 1 tablet (60 mg total) by mouth once daily. 30 tablet 11    [DISCONTINUED] potassium chloride SA (K-DUR,KLOR-CON) 20 MEQ tablet Take 1 tablet (20 mEq total) by mouth once daily. 30 tablet 11    [DISCONTINUED] pravastatin (PRAVACHOL) 20 MG tablet Take 1 tablet (20 mg total) by mouth once daily.  90 tablet 3    [DISCONTINUED] semaglutide (OZEMPIC) 1 mg/dose (4 mg/3 mL) Inject 1 mg into the skin every 7 days. 3 pen 3    [DISCONTINUED] triamterene-hydrochlorothiazide 37.5-25 mg (DYAZIDE) 37.5-25 mg per capsule Take 1 capsule by mouth once daily. 90 capsule 3     No current facility-administered medications on file prior to visit.       Labs:      Lab Results   Component Value Date    HGBA1C 6.4 01/04/2022      Lipids:   Lab Results   Component Value Date    CHOL 160 01/04/2022    CHOL 232 (H) 05/12/2021     Lab Results   Component Value Date    HDL 51 01/04/2022    HDL 52 05/12/2021     Lab Results   Component Value Date    LDLCALC 82 01/04/2022    LDLCALC 140 05/12/2021     Lab Results   Component Value Date    TRIG 134 01/04/2022    TRIG 199 (H) 05/12/2021     Lab Results   Component Value Date    CHOLHDL 3.1 01/04/2022    CHOLHDL 4.5 05/12/2021      Urine Ma/creat ratio:  Lab Results   Component Value Date    MICROALBUR 4.0 (H) 01/04/2022      CMP:  Sodium   Date Value Ref Range Status   01/04/2022 137 136 - 145 mmol/L Final     Potassium   Date Value Ref Range Status   01/04/2022 4.2 3.5 - 5.1 mmol/L Final     Chloride   Date Value Ref Range Status   01/04/2022 102 98 - 107 mmol/L Final     CO2   Date Value Ref Range Status   01/04/2022 27 21 - 32 mmol/L Final     Glucose   Date Value Ref Range Status   01/04/2022 136 (H) 74 - 106 mg/dL Final     BUN   Date Value Ref Range Status   01/04/2022 24 (H) 7 - 18 mg/dL Final     Creatinine   Date Value Ref Range Status   01/04/2022 1.07 0.70 - 1.30 mg/dL Final     Calcium   Date Value Ref Range Status   01/04/2022 9.3 8.5 - 10.1 mg/dL Final     Total Protein   Date Value Ref Range Status   01/04/2022 8.5 (H) 6.4 - 8.2 g/dL Final     Albumin   Date Value Ref Range Status   01/04/2022 4.0 3.5 - 5.0 g/dL Final     Bilirubin, Total   Date Value Ref Range Status   01/04/2022 0.9 0.0 - 1.2 mg/dL Final     Alk Phos   Date Value Ref Range Status   01/04/2022 82 45 -  115 U/L Final     AST   Date Value Ref Range Status   01/04/2022 20 15 - 37 U/L Final     ALT   Date Value Ref Range Status   01/04/2022 53 16 - 61 U/L Final     Anion Gap   Date Value Ref Range Status   01/04/2022 12 7 - 16 mmol/L Final     eGFR    Date Value Ref Range Status   05/12/2021 113 >=60 mL/min/1.73m² Final     eGFR   Date Value Ref Range Status   01/04/2022 79 >=60 mL/min/1.73m² Final      CBC:  Lab Results   Component Value Date    WBC 8.01 01/04/2022    RBC 5.52 01/04/2022    HGB 14.0 01/04/2022    HCT 44.6 01/04/2022    MCV 80.8 01/04/2022    MCH 25.4 (L) 01/04/2022    MCHC 31.4 (L) 01/04/2022    RDW 15.7 (H) 01/04/2022     01/04/2022    MPV 11.5 01/04/2022    LYMPH 38.7 01/04/2022    LYMPH 3.10 01/04/2022    MONO 5.5 01/04/2022    EOS 0.14 01/04/2022    BASO 0.06 01/04/2022    EOSINOPHIL 1.7 01/04/2022    BASOPHIL 0.7 01/04/2022      Lab Results   Component Value Date    TSH 0.906 01/04/2022       Medical/Social/Family History:     Past Medical History:   Diagnosis Date    Constipation     Diabetes mellitus, type 2 02/12/2014    Erectile dysfunction     Hypercholesteremia 03/04/2016    Hypertension     Severe obesity (BMI >= 40) 10/01/2012      Social History     Tobacco Use   Smoking Status Never Smoker   Smokeless Tobacco Never Used      Social History     Substance and Sexual Activity   Alcohol Use Yes    Comment: drinks some liquor one day each weekend       Family History   Problem Relation Age of Onset    Cancer Mother     Diabetes Mother     Hypertension Mother     Hypertension Father     Diabetes Father     Hypertension Sister     Hypertension Brother     No Known Problems Daughter     No Known Problems Son     Cancer Maternal Grandmother     Diabetes Maternal Grandmother     Hypertension Maternal Grandmother     Cancer Maternal Grandfather     Diabetes Maternal Grandfather     Hypertension Maternal Grandfather     Cancer Paternal Grandmother      "Diabetes Paternal Grandmother     Hypertension Paternal Grandmother     Cancer Paternal Grandfather     Diabetes Paternal Grandfather     Hypertension Paternal Grandfather     No Known Problems Daughter     No Known Problems Daughter     No Known Problems Daughter     No Known Problems Son       History reviewed. No pertinent surgical history.     Health Maintenance:     There is no immunization history on file for this patient.   Health Maintenance Due   Topic Date Due    COVID-19 Vaccine (1) Never done    Eye Exam  Never done    Colorectal Cancer Screening  Never done     Health Maintenance Topics with due status: Not Due       Topic Last Completion Date    Diabetes Urine Screening 01/04/2022    Foot Exam 01/04/2022    Lipid Panel 01/04/2022    Hemoglobin A1c 01/04/2022    Low Dose Statin 06/16/2022       Objective:      Wt Readings from Last 3 Encounters:   06/16/22 0918 99 kg (218 lb 3.2 oz)   03/15/22 0956 108.8 kg (239 lb 12.8 oz)   02/22/22 0906 113.9 kg (251 lb)     Vitals:    06/16/22 0918   BP: (!) 140/88   BP Location: Right arm   Patient Position: Sitting   BP Method: Small (Manual)   Pulse: 94   Resp: 18   Temp: 98 °F (36.7 °C)   SpO2: 97%   Weight: 99 kg (218 lb 3.2 oz)   Height: 5' 7" (1.702 m)     Body mass index is 34.17 kg/m².     Physical Exam:    Physical Exam  Vitals and nursing note reviewed.   Constitutional:       General: He is not in acute distress.     Appearance: Normal appearance.   HENT:      Head: Normocephalic.      Right Ear: Tympanic membrane, ear canal and external ear normal.      Left Ear: Tympanic membrane, ear canal and external ear normal.      Nose: Nose normal.      Mouth/Throat:      Mouth: Mucous membranes are moist.      Pharynx: Oropharynx is clear.   Eyes:      Conjunctiva/sclera: Conjunctivae normal.      Pupils: Pupils are equal, round, and reactive to light.   Neck:      Thyroid: No thyromegaly.      Vascular: Normal carotid pulses. No carotid bruit. "   Cardiovascular:      Rate and Rhythm: Normal rate and regular rhythm.      Pulses: Normal pulses.      Heart sounds: Normal heart sounds.   Pulmonary:      Effort: Pulmonary effort is normal. No respiratory distress.      Breath sounds: Normal breath sounds.   Abdominal:      Palpations: Abdomen is soft.      Tenderness: There is no abdominal tenderness.   Musculoskeletal:      Cervical back: Neck supple.      Right lower leg: No edema.      Left lower leg: No edema.   Lymphadenopathy:      Cervical: No cervical adenopathy.   Skin:     General: Skin is warm and dry.      Capillary Refill: Capillary refill takes less than 2 seconds.   Neurological:      General: No focal deficit present.      Mental Status: He is alert and oriented to person, place, and time.   Psychiatric:         Mood and Affect: Mood normal.         Behavior: Behavior normal.          Assessment:          ICD-10-CM ICD-9-CM   1. Routine general medical examination at a health care facility  Z00.00 V70.0   2. Screening for hyperlipidemia  Z13.220 V77.91   3. Diabetes mellitus screening  Z13.1 V77.1   4. Prostate cancer screening  Z12.5 V76.44   5. Encounter for long-term (current) use of other medications  Z79.899 V58.69   6. Type 2 diabetes mellitus without complication, without long-term current use of insulin  E11.9 250.00   7. Primary hypertension  I10 401.9   8. Hypercholesteremia  E78.00 272.0   9. Type 2 diabetes mellitus with hyperglycemia, without long-term current use of insulin  E11.65 250.00     790.29   10. Mixed hyperlipidemia  E78.2 272.2   11. Essential hypertension  I10 401.9   12. Controlled type 2 diabetes mellitus without complication, without long-term current use of insulin  E11.9 250.00   13. Colon cancer screening  Z12.11 V76.51        Plan:       Routine general medical examination at a health care facility    Screening for hyperlipidemia  -     Lipid Panel; Future; Expected date: 06/16/2022    Diabetes mellitus  screening    Prostate cancer screening  -     PSA, Screening; Future; Expected date: 06/16/2022    Encounter for long-term (current) use of other medications  -     Comprehensive Metabolic Panel; Future; Expected date: 06/16/2022    Type 2 diabetes mellitus without complication, without long-term current use of insulin  -     Hemoglobin A1C; Future; Expected date: 06/16/2022  -     Comprehensive Metabolic Panel; Future; Expected date: 06/16/2022    Primary hypertension    Hypercholesteremia  -     Comprehensive Metabolic Panel; Future; Expected date: 06/16/2022    Type 2 diabetes mellitus with hyperglycemia, without long-term current use of insulin  -     semaglutide (OZEMPIC) 1 mg/dose (4 mg/3 mL); Inject 1 mg into the skin every 7 days.  Dispense: 3 pen; Refill: 3    Mixed hyperlipidemia  -     pravastatin (PRAVACHOL) 20 MG tablet; Take 1 tablet (20 mg total) by mouth once daily.  Dispense: 90 tablet; Refill: 3    Essential hypertension  -     cloNIDine (CATAPRES) 0.2 MG tablet; Take 1 tablet (0.2 mg total) by mouth every evening.  Dispense: 90 tablet; Refill: 3  -     lisinopriL-hydrochlorothiazide (PRINZIDE,ZESTORETIC) 20-25 mg Tab; Take 1 tablet by mouth once daily.  Dispense: 90 tablet; Refill: 3  -     triamterene-hydrochlorothiazide 37.5-25 mg (DYAZIDE) 37.5-25 mg per capsule; Take 1 capsule by mouth once daily.  Dispense: 90 capsule; Refill: 3    Controlled type 2 diabetes mellitus without complication, without long-term current use of insulin  -     NIFEdipine (ADALAT CC) 60 MG TbSR; Take 1 tablet (60 mg total) by mouth once daily.  Dispense: 90 tablet; Refill: 3    Colon cancer screening  -     Ambulatory referral/consult to Gastroenterology; Future; Expected date: 06/23/2022        Current Outpatient Medications:     metFORMIN (GLUCOPHAGE) 1000 MG tablet, Take 1 tablet (1,000 mg total) by mouth 2 (two) times daily with meals., Disp: 60 tablet, Rfl: 11    cloNIDine (CATAPRES) 0.2 MG tablet, Take 1  tablet (0.2 mg total) by mouth every evening., Disp: 90 tablet, Rfl: 3    lisinopriL-hydrochlorothiazide (PRINZIDE,ZESTORETIC) 20-25 mg Tab, Take 1 tablet by mouth once daily., Disp: 90 tablet, Rfl: 3    NIFEdipine (ADALAT CC) 60 MG TbSR, Take 1 tablet (60 mg total) by mouth once daily., Disp: 90 tablet, Rfl: 3    pravastatin (PRAVACHOL) 20 MG tablet, Take 1 tablet (20 mg total) by mouth once daily., Disp: 90 tablet, Rfl: 3    semaglutide (OZEMPIC) 1 mg/dose (4 mg/3 mL), Inject 1 mg into the skin every 7 days., Disp: 3 pen, Rfl: 3    triamterene-hydrochlorothiazide 37.5-25 mg (DYAZIDE) 37.5-25 mg per capsule, Take 1 capsule by mouth once daily., Disp: 90 capsule, Rfl: 3      New & refilled meds:  Requested Prescriptions     Signed Prescriptions Disp Refills    semaglutide (OZEMPIC) 1 mg/dose (4 mg/3 mL) 3 pen 3     Sig: Inject 1 mg into the skin every 7 days.    pravastatin (PRAVACHOL) 20 MG tablet 90 tablet 3     Sig: Take 1 tablet (20 mg total) by mouth once daily.    cloNIDine (CATAPRES) 0.2 MG tablet 90 tablet 3     Sig: Take 1 tablet (0.2 mg total) by mouth every evening.    lisinopriL-hydrochlorothiazide (PRINZIDE,ZESTORETIC) 20-25 mg Tab 90 tablet 3     Sig: Take 1 tablet by mouth once daily.    triamterene-hydrochlorothiazide 37.5-25 mg (DYAZIDE) 37.5-25 mg per capsule 90 capsule 3     Sig: Take 1 capsule by mouth once daily.    NIFEdipine (ADALAT CC) 60 MG TbSR 90 tablet 3     Sig: Take 1 tablet (60 mg total) by mouth once daily.     HY + other needed labs today    Health goals to improve overall health and well-being:  BMI < 30 - set goal to lose 1-2 lbs per week  Healthy/DASH diet, exercise at least 5 days per week  fasting glucose < 100  SBP < 130 & DBP < 80  LDL chol < 100   Stressed medication adherence.  YOLANDE PEC for eye exam  Refer for screening colonoscopy    Rx changes: decrease metformin to 500 mg daily; continue other meds    Return to clinic 6 mths for T2DM, HTN; 1 yr HY w/ fasting;  and f/u as needed.    Future Appointments   Date Time Provider Department Center   12/20/2022  9:00 AM MICAELA Lowry [facility]   6/19/2023  8:20 AM MICAELA Lowry [facility]        Signature:  MICAELA Lowry [facility] PRIMARY [facility] - FAMILY MEDICINE    Date of encounter: 6/16/22

## 2022-06-16 NOTE — LETTER
June 16, 2022      [facility] - Family Medicine  [facility] [place] [address]  Phone: [phone]  Fax: [phone]       Patient: Russel Hill   YOB: 1975  Date of Visit: 06/16/2022    To Whom It May Concern:    Asher Hill  was at [facility] on 06/16/2022. The patient may return to work/school on 06/16/2022 with no restrictions. If you have any questions or concerns, or if I can be of further assistance, please do not hesitate to contact me.    Sincerely,  DERRICK Zapien RN

## 2022-06-29 RX ORDER — POTASSIUM CHLORIDE 20 MEQ/1
20 TABLET, EXTENDED RELEASE ORAL DAILY
Qty: 90 TABLET | Refills: 1 | Status: SHIPPED | OUTPATIENT
Start: 2022-06-29 | End: 2022-12-29 | Stop reason: ALTCHOICE

## 2022-06-29 NOTE — TELEPHONE ENCOUNTER
Pt requested. Last seen 6/16/22 and has appt 12/20/22.    This was removed from pts med list on 6/16/22 during his last visit. I didn't see any specific documentation about stopping it but If he does not need to take it, let me know.

## 2022-06-29 NOTE — TELEPHONE ENCOUNTER
----- Message from Mari Tobar sent at 6/29/2022  2:49 PM CDT -----  Patient needs a refill on Potassium called into [facility] pharmacy at [phone].  Please call patient at [phone] if you have any questions. Thanks!

## 2022-07-15 ENCOUNTER — OFFICE VISIT (OUTPATIENT)
Dept: FAMILY MEDICINE | Facility: CLINIC | Age: 47
End: 2022-07-15
Payer: COMMERCIAL

## 2022-07-15 VITALS
HEART RATE: 92 BPM | HEIGHT: 67 IN | DIASTOLIC BLOOD PRESSURE: 57 MMHG | WEIGHT: 218 LBS | BODY MASS INDEX: 34.21 KG/M2 | TEMPERATURE: 100 F | SYSTOLIC BLOOD PRESSURE: 83 MMHG | RESPIRATION RATE: 20 BRPM | OXYGEN SATURATION: 96 %

## 2022-07-15 DIAGNOSIS — I95.2 HYPOTENSION DUE TO DRUGS: ICD-10-CM

## 2022-07-15 DIAGNOSIS — Z20.828 CONTACT WITH OR EXPOSURE TO VIRAL DISEASE: Primary | ICD-10-CM

## 2022-07-15 LAB
CTP QC/QA: YES
FLUAV AG NPH QL: NEGATIVE
FLUBV AG NPH QL: NEGATIVE
SARS-COV-2 AG RESP QL IA.RAPID: NEGATIVE

## 2022-07-15 PROCEDURE — 3044F HG A1C LEVEL LT 7.0%: CPT | Mod: ,,, | Performed by: FAMILY MEDICINE

## 2022-07-15 PROCEDURE — 99051 PR MEDICAL SERVICES, EVE/WKEND/HOLIDAY: ICD-10-PCS | Mod: ,,, | Performed by: FAMILY MEDICINE

## 2022-07-15 PROCEDURE — 99213 PR OFFICE/OUTPT VISIT, EST, LEVL III, 20-29 MIN: ICD-10-PCS | Mod: ,,, | Performed by: FAMILY MEDICINE

## 2022-07-15 PROCEDURE — 3008F BODY MASS INDEX DOCD: CPT | Mod: ,,, | Performed by: FAMILY MEDICINE

## 2022-07-15 PROCEDURE — 3078F PR MOST RECENT DIASTOLIC BLOOD PRESSURE < 80 MM HG: ICD-10-PCS | Mod: ,,, | Performed by: FAMILY MEDICINE

## 2022-07-15 PROCEDURE — 4010F PR ACE/ARB THEARPY RXD/TAKEN: ICD-10-PCS | Mod: ,,, | Performed by: FAMILY MEDICINE

## 2022-07-15 PROCEDURE — 87428 SARSCOV & INF VIR A&B AG IA: CPT | Mod: QW,,, | Performed by: FAMILY MEDICINE

## 2022-07-15 PROCEDURE — 3074F PR MOST RECENT SYSTOLIC BLOOD PRESSURE < 130 MM HG: ICD-10-PCS | Mod: ,,, | Performed by: FAMILY MEDICINE

## 2022-07-15 PROCEDURE — 3066F NEPHROPATHY DOC TX: CPT | Mod: ,,, | Performed by: FAMILY MEDICINE

## 2022-07-15 PROCEDURE — 87428 POCT SARS-COV2 (COVID) WITH FLU ANTIGEN: ICD-10-PCS | Mod: QW,,, | Performed by: FAMILY MEDICINE

## 2022-07-15 PROCEDURE — 3044F PR MOST RECENT HEMOGLOBIN A1C LEVEL <7.0%: ICD-10-PCS | Mod: ,,, | Performed by: FAMILY MEDICINE

## 2022-07-15 PROCEDURE — 99213 OFFICE O/P EST LOW 20 MIN: CPT | Mod: ,,, | Performed by: FAMILY MEDICINE

## 2022-07-15 PROCEDURE — 99051 MED SERV EVE/WKEND/HOLIDAY: CPT | Mod: ,,, | Performed by: FAMILY MEDICINE

## 2022-07-15 PROCEDURE — 4010F ACE/ARB THERAPY RXD/TAKEN: CPT | Mod: ,,, | Performed by: FAMILY MEDICINE

## 2022-07-15 PROCEDURE — 3060F POS MICROALBUMINURIA REV: CPT | Mod: ,,, | Performed by: FAMILY MEDICINE

## 2022-07-15 PROCEDURE — 3060F PR POS MICROALBUMINURIA RESULT DOCUMENTED/REVIEW: ICD-10-PCS | Mod: ,,, | Performed by: FAMILY MEDICINE

## 2022-07-15 PROCEDURE — 3066F PR DOCUMENTATION OF TREATMENT FOR NEPHROPATHY: ICD-10-PCS | Mod: ,,, | Performed by: FAMILY MEDICINE

## 2022-07-15 PROCEDURE — 3078F DIAST BP <80 MM HG: CPT | Mod: ,,, | Performed by: FAMILY MEDICINE

## 2022-07-15 PROCEDURE — 3074F SYST BP LT 130 MM HG: CPT | Mod: ,,, | Performed by: FAMILY MEDICINE

## 2022-07-15 PROCEDURE — 3008F PR BODY MASS INDEX (BMI) DOCUMENTED: ICD-10-PCS | Mod: ,,, | Performed by: FAMILY MEDICINE

## 2022-07-15 NOTE — PROGRESS NOTES
Subjective:       Patient ID: Russel Hill is a 46 y.o. male.    Chief Complaint: COVID-19 Concerns (Covid exposure)    Low-grade fever in fatigue.  Some headache no cough    Review of Systems      Objective:      Physical Exam  Constitutional:       General: He is not in acute distress.     Appearance: He is not ill-appearing.   HENT:      Right Ear: Tympanic membrane normal.      Left Ear: Tympanic membrane normal.      Nose: Congestion and rhinorrhea present.      Mouth/Throat:      Pharynx: No posterior oropharyngeal erythema.   Cardiovascular:      Rate and Rhythm: Normal rate and regular rhythm.   Pulmonary:      Effort: Pulmonary effort is normal.      Breath sounds: Normal breath sounds.   Skin:     General: Skin is warm and dry.      Findings: No rash.   Neurological:      Mental Status: He is alert.         Assessment:       Problem List Items Addressed This Visit    None     Visit Diagnoses     Contact with or exposure to viral disease    -  Primary    Relevant Orders    POCT SARS-COV2 (COVID) with Flu Antigen          Plan:     COVID is negative.  Symptoms very suspicious for COVID.  Recommend quarantine in till symptoms resolve.  Come in for retesting if fatigue or fever get any worse    hypotension probably due to clonidine.  Patient advised that his blood pressure is far too low.  Recommend monitoring blood pressure closely before restarting clonidine

## 2022-07-18 ENCOUNTER — OFFICE VISIT (OUTPATIENT)
Dept: FAMILY MEDICINE | Facility: CLINIC | Age: 47
End: 2022-07-18
Payer: COMMERCIAL

## 2022-07-18 VITALS
HEART RATE: 72 BPM | OXYGEN SATURATION: 98 % | SYSTOLIC BLOOD PRESSURE: 123 MMHG | HEIGHT: 67 IN | RESPIRATION RATE: 18 BRPM | BODY MASS INDEX: 33.96 KG/M2 | DIASTOLIC BLOOD PRESSURE: 84 MMHG | TEMPERATURE: 99 F | WEIGHT: 216.38 LBS

## 2022-07-18 DIAGNOSIS — U07.1 COVID-19: Primary | ICD-10-CM

## 2022-07-18 DIAGNOSIS — Z20.828 EXPOSURE TO VIRAL DISEASE: ICD-10-CM

## 2022-07-18 LAB
CTP QC/QA: YES
FLUAV AG NPH QL: NEGATIVE
FLUBV AG NPH QL: NEGATIVE
SARS-COV-2 AG RESP QL IA.RAPID: POSITIVE

## 2022-07-18 PROCEDURE — 3008F PR BODY MASS INDEX (BMI) DOCUMENTED: ICD-10-PCS | Mod: ,,, | Performed by: FAMILY MEDICINE

## 2022-07-18 PROCEDURE — 4010F ACE/ARB THERAPY RXD/TAKEN: CPT | Mod: ,,, | Performed by: FAMILY MEDICINE

## 2022-07-18 PROCEDURE — 1160F PR REVIEW ALL MEDS BY PRESCRIBER/CLIN PHARMACIST DOCUMENTED: ICD-10-PCS | Mod: ,,, | Performed by: FAMILY MEDICINE

## 2022-07-18 PROCEDURE — 3044F HG A1C LEVEL LT 7.0%: CPT | Mod: ,,, | Performed by: FAMILY MEDICINE

## 2022-07-18 PROCEDURE — 3074F SYST BP LT 130 MM HG: CPT | Mod: ,,, | Performed by: FAMILY MEDICINE

## 2022-07-18 PROCEDURE — 3066F NEPHROPATHY DOC TX: CPT | Mod: ,,, | Performed by: FAMILY MEDICINE

## 2022-07-18 PROCEDURE — 3074F PR MOST RECENT SYSTOLIC BLOOD PRESSURE < 130 MM HG: ICD-10-PCS | Mod: ,,, | Performed by: FAMILY MEDICINE

## 2022-07-18 PROCEDURE — 3079F PR MOST RECENT DIASTOLIC BLOOD PRESSURE 80-89 MM HG: ICD-10-PCS | Mod: ,,, | Performed by: FAMILY MEDICINE

## 2022-07-18 PROCEDURE — 3008F BODY MASS INDEX DOCD: CPT | Mod: ,,, | Performed by: FAMILY MEDICINE

## 2022-07-18 PROCEDURE — 3044F PR MOST RECENT HEMOGLOBIN A1C LEVEL <7.0%: ICD-10-PCS | Mod: ,,, | Performed by: FAMILY MEDICINE

## 2022-07-18 PROCEDURE — 3066F PR DOCUMENTATION OF TREATMENT FOR NEPHROPATHY: ICD-10-PCS | Mod: ,,, | Performed by: FAMILY MEDICINE

## 2022-07-18 PROCEDURE — 3079F DIAST BP 80-89 MM HG: CPT | Mod: ,,, | Performed by: FAMILY MEDICINE

## 2022-07-18 PROCEDURE — 87428 POCT SARS-COV2 (COVID) WITH FLU ANTIGEN: ICD-10-PCS | Mod: QW,,, | Performed by: FAMILY MEDICINE

## 2022-07-18 PROCEDURE — 4010F PR ACE/ARB THEARPY RXD/TAKEN: ICD-10-PCS | Mod: ,,, | Performed by: FAMILY MEDICINE

## 2022-07-18 PROCEDURE — 1160F RVW MEDS BY RX/DR IN RCRD: CPT | Mod: ,,, | Performed by: FAMILY MEDICINE

## 2022-07-18 PROCEDURE — 87428 SARSCOV & INF VIR A&B AG IA: CPT | Mod: QW,,, | Performed by: FAMILY MEDICINE

## 2022-07-18 PROCEDURE — 1159F PR MEDICATION LIST DOCUMENTED IN MEDICAL RECORD: ICD-10-PCS | Mod: ,,, | Performed by: FAMILY MEDICINE

## 2022-07-18 PROCEDURE — 99214 PR OFFICE/OUTPT VISIT, EST, LEVL IV, 30-39 MIN: ICD-10-PCS | Mod: ,,, | Performed by: FAMILY MEDICINE

## 2022-07-18 PROCEDURE — 3060F POS MICROALBUMINURIA REV: CPT | Mod: ,,, | Performed by: FAMILY MEDICINE

## 2022-07-18 PROCEDURE — 1159F MED LIST DOCD IN RCRD: CPT | Mod: ,,, | Performed by: FAMILY MEDICINE

## 2022-07-18 PROCEDURE — 3060F PR POS MICROALBUMINURIA RESULT DOCUMENTED/REVIEW: ICD-10-PCS | Mod: ,,, | Performed by: FAMILY MEDICINE

## 2022-07-18 PROCEDURE — 99214 OFFICE O/P EST MOD 30 MIN: CPT | Mod: ,,, | Performed by: FAMILY MEDICINE

## 2022-07-18 RX ORDER — CETIRIZINE HYDROCHLORIDE 10 MG/1
10 TABLET ORAL DAILY
Qty: 10 TABLET | Refills: 0 | Status: SHIPPED | OUTPATIENT
Start: 2022-07-18 | End: 2022-12-20

## 2022-07-18 RX ORDER — PREDNISONE 20 MG/1
20 TABLET ORAL DAILY
Qty: 5 TABLET | Refills: 0 | Status: SHIPPED | OUTPATIENT
Start: 2022-07-18 | End: 2022-07-23

## 2022-07-18 RX ORDER — FAMOTIDINE 20 MG/1
20 TABLET, FILM COATED ORAL 2 TIMES DAILY
Qty: 20 TABLET | Refills: 0 | Status: SHIPPED | OUTPATIENT
Start: 2022-07-18 | End: 2022-12-20

## 2022-07-18 RX ORDER — SULFAMETHOXAZOLE AND TRIMETHOPRIM 800; 160 MG/1; MG/1
1 TABLET ORAL 2 TIMES DAILY
Qty: 10 TABLET | Refills: 0 | Status: SHIPPED | OUTPATIENT
Start: 2022-07-18 | End: 2022-07-23

## 2022-07-18 NOTE — LETTER
July 18, 2022      [facility]  [address] [place] [address] [place] [address]  Phone: [phone]  Fax: [phone]       Patient: Russel Hill   YOB: 1975  Date of Visit: 07/18/2022    To Whom It May Concern:    Asher Hill  was at [facility] on 07/18/2022. The patient may return to work on 07/23/2022 with no restrictions. If you have any questions or concerns, or if I can be of further assistance, please do not hesitate to contact me.    Sincerely,    Juan C Melgar LPN

## 2022-07-18 NOTE — PROGRESS NOTES
Subjective:       Patient ID: Russel Hill is a 46 y.o. male.    Chief Complaint: Flank Pain and COVID-19 Concerns (C/O BODY ACHES, FEVER , EXPOSED TO POSITIVE. STATES HOME TEST WAS POSITIVE.)    HPI  Review of Systems   Constitutional: Negative for activity change, appetite change, chills, diaphoresis, fatigue, fever and unexpected weight change.   HENT: Positive for nasal congestion, postnasal drip, rhinorrhea, sinus pressure/congestion and sore throat. Negative for dental problem, drooling, ear discharge, ear pain, facial swelling, hearing loss, mouth sores, nosebleeds, sneezing, tinnitus, trouble swallowing, voice change and goiter.    Eyes: Negative for photophobia, pain, discharge, redness, itching and visual disturbance.   Respiratory: Negative for apnea, cough, choking, chest tightness, shortness of breath, wheezing and stridor.    Cardiovascular: Negative for chest pain, palpitations, leg swelling and claudication.   Gastrointestinal: Negative for abdominal distention, abdominal pain, anal bleeding, blood in stool, change in bowel habit, constipation, diarrhea, nausea, vomiting, reflux, fecal incontinence and change in bowel habit.   Endocrine: Negative for cold intolerance, heat intolerance, polydipsia, polyphagia and polyuria.   Genitourinary: Negative for bladder incontinence, decreased urine volume, difficulty urinating, discharge, dysuria, enuresis, erectile dysfunction, flank pain, frequency, genital sores, hematuria, penile pain, testicular pain and urgency.   Musculoskeletal: Negative for arthralgias, back pain, gait problem, joint swelling, leg pain, myalgias, neck pain, neck stiffness and joint deformity.   Integumentary:  Negative for pallor, rash, wound and mole/lesion.   Allergic/Immunologic: Negative for environmental allergies, food allergies and frequent infections.   Neurological: Negative for dizziness, vertigo, tremors, seizures, syncope, facial asymmetry, speech difficulty,  weakness, light-headedness, numbness, headaches, disturbances in coordination, memory loss and coordination difficulties.   Hematological: Negative for adenopathy. Does not bruise/bleed easily.   Psychiatric/Behavioral: Negative for agitation, behavioral problems, confusion, decreased concentration, dysphoric mood, hallucinations, self-injury, sleep disturbance and suicidal ideas. The patient is not nervous/anxious and is not hyperactive.          Objective:      Physical Exam  Vitals reviewed.   Constitutional:       Appearance: Normal appearance. He is normal weight.   HENT:      Head: Normocephalic and atraumatic.      Right Ear: Tympanic membrane and ear canal normal.      Left Ear: Tympanic membrane, ear canal and external ear normal.      Nose: Congestion and rhinorrhea present.      Mouth/Throat:      Mouth: Mucous membranes are moist.      Pharynx: Oropharynx is clear. Posterior oropharyngeal erythema present.   Eyes:      Extraocular Movements: Extraocular movements intact.      Conjunctiva/sclera: Conjunctivae normal.      Pupils: Pupils are equal, round, and reactive to light.   Cardiovascular:      Rate and Rhythm: Normal rate and regular rhythm.      Pulses: Normal pulses.      Heart sounds: Normal heart sounds.   Pulmonary:      Effort: Pulmonary effort is normal.      Breath sounds: Normal breath sounds.   Abdominal:      General: Abdomen is flat. Bowel sounds are normal.      Palpations: Abdomen is soft.   Musculoskeletal:         General: Normal range of motion.      Cervical back: Normal range of motion and neck supple.   Skin:     General: Skin is warm and dry.   Neurological:      General: No focal deficit present.      Mental Status: He is alert and oriented to person, place, and time. Mental status is at baseline.   Psychiatric:         Mood and Affect: Mood normal.         Behavior: Behavior normal.         Thought Content: Thought content normal.         Judgment: Judgment normal.          Assessment:       1. COVID-19    2. Exposure to viral disease        Plan:     COVID-19    Exposure to viral disease  -     POCT SARS-COV2 (COVID) with Flu Antigen    Other orders  -     sulfamethoxazole-trimethoprim 800-160mg (BACTRIM DS) 800-160 mg Tab; Take 1 tablet by mouth 2 (two) times daily. for 5 days  Dispense: 10 tablet; Refill: 0  -     predniSONE (DELTASONE) 20 MG tablet; Take 1 tablet (20 mg total) by mouth once daily. for 5 days  Dispense: 5 tablet; Refill: 0  -     cetirizine (ZYRTEC) 10 MG tablet; Take 1 tablet (10 mg total) by mouth once daily. for 10 days  Dispense: 10 tablet; Refill: 0  -     famotidine (PEPCID) 20 MG tablet; Take 1 tablet (20 mg total) by mouth 2 (two) times daily. for 10 days  Dispense: 20 tablet; Refill: 0

## 2022-09-01 ENCOUNTER — OFFICE VISIT (OUTPATIENT)
Dept: FAMILY MEDICINE | Facility: CLINIC | Age: 47
End: 2022-09-01
Payer: COMMERCIAL

## 2022-09-01 VITALS
HEIGHT: 67 IN | TEMPERATURE: 98 F | OXYGEN SATURATION: 98 % | SYSTOLIC BLOOD PRESSURE: 137 MMHG | HEART RATE: 85 BPM | RESPIRATION RATE: 18 BRPM | DIASTOLIC BLOOD PRESSURE: 88 MMHG | WEIGHT: 215 LBS | BODY MASS INDEX: 33.74 KG/M2

## 2022-09-01 DIAGNOSIS — R10.32 LEFT LOWER QUADRANT ABDOMINAL PAIN: Primary | ICD-10-CM

## 2022-09-01 DIAGNOSIS — E66.01 SEVERE OBESITY (BMI >= 40): ICD-10-CM

## 2022-09-01 DIAGNOSIS — K59.00 CONSTIPATION, UNSPECIFIED CONSTIPATION TYPE: ICD-10-CM

## 2022-09-01 LAB
BILIRUB SERPL-MCNC: NEGATIVE MG/DL
BLOOD URINE, POC: NEGATIVE
COLOR, POC UA: NORMAL
GLUCOSE UR QL STRIP: NEGATIVE
KETONES UR QL STRIP: NEGATIVE
LEUKOCYTE ESTERASE URINE, POC: NEGATIVE
NITRITE, POC UA: NEGATIVE
PH, POC UA: 5.5
PROTEIN, POC: NEGATIVE
SPECIFIC GRAVITY, POC UA: >=1.03
UROBILINOGEN, POC UA: 0.2

## 2022-09-01 PROCEDURE — 81003 POCT URINALYSIS W/O SCOPE: ICD-10-PCS | Mod: QW,,, | Performed by: NURSE PRACTITIONER

## 2022-09-01 PROCEDURE — 3044F PR MOST RECENT HEMOGLOBIN A1C LEVEL <7.0%: ICD-10-PCS | Mod: ,,, | Performed by: NURSE PRACTITIONER

## 2022-09-01 PROCEDURE — 99213 OFFICE O/P EST LOW 20 MIN: CPT | Mod: ,,, | Performed by: NURSE PRACTITIONER

## 2022-09-01 PROCEDURE — 3060F PR POS MICROALBUMINURIA RESULT DOCUMENTED/REVIEW: ICD-10-PCS | Mod: ,,, | Performed by: NURSE PRACTITIONER

## 2022-09-01 PROCEDURE — 3008F PR BODY MASS INDEX (BMI) DOCUMENTED: ICD-10-PCS | Mod: ,,, | Performed by: NURSE PRACTITIONER

## 2022-09-01 PROCEDURE — 3008F BODY MASS INDEX DOCD: CPT | Mod: ,,, | Performed by: NURSE PRACTITIONER

## 2022-09-01 PROCEDURE — 99213 PR OFFICE/OUTPT VISIT, EST, LEVL III, 20-29 MIN: ICD-10-PCS | Mod: ,,, | Performed by: NURSE PRACTITIONER

## 2022-09-01 PROCEDURE — 3079F PR MOST RECENT DIASTOLIC BLOOD PRESSURE 80-89 MM HG: ICD-10-PCS | Mod: ,,, | Performed by: NURSE PRACTITIONER

## 2022-09-01 PROCEDURE — 3075F PR MOST RECENT SYSTOLIC BLOOD PRESS GE 130-139MM HG: ICD-10-PCS | Mod: ,,, | Performed by: NURSE PRACTITIONER

## 2022-09-01 PROCEDURE — 4010F PR ACE/ARB THEARPY RXD/TAKEN: ICD-10-PCS | Mod: ,,, | Performed by: NURSE PRACTITIONER

## 2022-09-01 PROCEDURE — 1159F PR MEDICATION LIST DOCUMENTED IN MEDICAL RECORD: ICD-10-PCS | Mod: ,,, | Performed by: NURSE PRACTITIONER

## 2022-09-01 PROCEDURE — 81003 URINALYSIS AUTO W/O SCOPE: CPT | Mod: QW,,, | Performed by: NURSE PRACTITIONER

## 2022-09-01 PROCEDURE — 1159F MED LIST DOCD IN RCRD: CPT | Mod: ,,, | Performed by: NURSE PRACTITIONER

## 2022-09-01 PROCEDURE — 3079F DIAST BP 80-89 MM HG: CPT | Mod: ,,, | Performed by: NURSE PRACTITIONER

## 2022-09-01 PROCEDURE — 3066F NEPHROPATHY DOC TX: CPT | Mod: ,,, | Performed by: NURSE PRACTITIONER

## 2022-09-01 PROCEDURE — 3044F HG A1C LEVEL LT 7.0%: CPT | Mod: ,,, | Performed by: NURSE PRACTITIONER

## 2022-09-01 PROCEDURE — 3060F POS MICROALBUMINURIA REV: CPT | Mod: ,,, | Performed by: NURSE PRACTITIONER

## 2022-09-01 PROCEDURE — 3075F SYST BP GE 130 - 139MM HG: CPT | Mod: ,,, | Performed by: NURSE PRACTITIONER

## 2022-09-01 PROCEDURE — 4010F ACE/ARB THERAPY RXD/TAKEN: CPT | Mod: ,,, | Performed by: NURSE PRACTITIONER

## 2022-09-01 PROCEDURE — 3066F PR DOCUMENTATION OF TREATMENT FOR NEPHROPATHY: ICD-10-PCS | Mod: ,,, | Performed by: NURSE PRACTITIONER

## 2022-09-01 NOTE — LETTER
September 1, 2022      [facility]  [address] [facility] [place] [address]  Phone: [phone]  Fax: [phone]       Patient: Russel Hill   YOB: 1975  Date of Visit: 09/01/2022    To Whom It May Concern:    Asher Hill  was at [facility] on 09/01/2022. The patient may return to work/school on 09/02/2022 without restrictions. If you have any questions or concerns, or if I can be of further assistance, please do not hesitate to contact me.    Sincerely,    MICAELA Figueroa

## 2022-09-01 NOTE — PROGRESS NOTES
Subjective:       Patient ID: Russel Hill is a 46 y.o. male.    Chief Complaint: Abdominal Pain (Left lower quadrant- issues with constipation)    Left lower abdominal pain- issues with constipation because he take Ozempic regularly- has been trying dulcolax with no relief in symptoms  Review of Systems   Constitutional:  Negative for appetite change, fatigue and fever.   HENT:  Negative for nasal congestion, ear pain and sore throat.    Eyes:  Negative for pain, discharge and itching.   Respiratory:  Negative for cough and shortness of breath.    Cardiovascular:  Negative for chest pain and leg swelling.   Gastrointestinal:  Positive for abdominal pain and constipation. Negative for change in bowel habit, diarrhea, nausea, vomiting and change in bowel habit.   Musculoskeletal:  Negative for back pain, gait problem and neck pain.   Integumentary:  Negative for rash and wound.   Neurological:  Negative for dizziness, weakness and headaches.   All other systems reviewed and are negative.      Objective:      Physical Exam  Vitals and nursing note reviewed.   Constitutional:       General: He is not in acute distress.     Appearance: Normal appearance. He is not ill-appearing, toxic-appearing or diaphoretic.   HENT:      Head: Normocephalic.   Eyes:      General: No scleral icterus.        Right eye: No discharge.         Left eye: No discharge.      Extraocular Movements: Extraocular movements intact.      Pupils: Pupils are equal, round, and reactive to light.   Cardiovascular:      Rate and Rhythm: Normal rate and regular rhythm.      Pulses: Normal pulses.      Heart sounds: Normal heart sounds. No murmur heard.  Pulmonary:      Effort: Pulmonary effort is normal. No respiratory distress.      Breath sounds: Normal breath sounds. No wheezing, rhonchi or rales.   Abdominal:      General: Bowel sounds are decreased. There is no distension.      Palpations: Abdomen is soft. There is no mass.      Tenderness:  There is abdominal tenderness in the left lower quadrant. There is no guarding or rebound.      Hernia: No hernia is present.      Comments: Mildly TTP in the LLQ   Musculoskeletal:         General: Normal range of motion.      Cervical back: Neck supple. No tenderness.   Lymphadenopathy:      Cervical: No cervical adenopathy.   Skin:     General: Skin is warm and dry.      Capillary Refill: Capillary refill takes less than 2 seconds.      Findings: No rash.   Neurological:      Mental Status: He is alert and oriented to person, place, and time.   Psychiatric:         Mood and Affect: Mood normal.         Behavior: Behavior normal.         Thought Content: Thought content normal.         Judgment: Judgment normal.          Assessment:       1. Left lower quadrant abdominal pain    2. Constipation, unspecified constipation type    3. Severe obesity (BMI >= 40)        Plan:   Left lower quadrant abdominal pain  -     X-Ray KUB; Future; Expected date: 09/01/2022  -     POCT URINALYSIS W/O SCOPE    Constipation, unspecified constipation type  -     linaCLOtide (LINZESS) 290 mcg Cap capsule; Take 1 capsule (290 mcg total) by mouth before breakfast.  Dispense: 30 capsule; Refill: 0    Severe obesity (BMI >= 40)

## 2022-10-11 ENCOUNTER — OFFICE VISIT (OUTPATIENT)
Dept: FAMILY MEDICINE | Facility: CLINIC | Age: 47
End: 2022-10-11
Payer: COMMERCIAL

## 2022-10-11 VITALS
BODY MASS INDEX: 31.71 KG/M2 | SYSTOLIC BLOOD PRESSURE: 137 MMHG | OXYGEN SATURATION: 99 % | HEIGHT: 67 IN | WEIGHT: 202 LBS | HEART RATE: 79 BPM | TEMPERATURE: 98 F | DIASTOLIC BLOOD PRESSURE: 90 MMHG

## 2022-10-11 DIAGNOSIS — Z76.0 ENCOUNTER FOR MEDICATION REFILL: Primary | ICD-10-CM

## 2022-10-11 DIAGNOSIS — K59.00 CONSTIPATION, UNSPECIFIED CONSTIPATION TYPE: ICD-10-CM

## 2022-10-11 PROCEDURE — 3008F BODY MASS INDEX DOCD: CPT | Mod: ,,, | Performed by: NURSE PRACTITIONER

## 2022-10-11 PROCEDURE — 3008F PR BODY MASS INDEX (BMI) DOCUMENTED: ICD-10-PCS | Mod: ,,, | Performed by: NURSE PRACTITIONER

## 2022-10-11 PROCEDURE — 3075F SYST BP GE 130 - 139MM HG: CPT | Mod: ,,, | Performed by: NURSE PRACTITIONER

## 2022-10-11 PROCEDURE — 3066F NEPHROPATHY DOC TX: CPT | Mod: ,,, | Performed by: NURSE PRACTITIONER

## 2022-10-11 PROCEDURE — 3075F PR MOST RECENT SYSTOLIC BLOOD PRESS GE 130-139MM HG: ICD-10-PCS | Mod: ,,, | Performed by: NURSE PRACTITIONER

## 2022-10-11 PROCEDURE — 99213 OFFICE O/P EST LOW 20 MIN: CPT | Mod: ,,, | Performed by: NURSE PRACTITIONER

## 2022-10-11 PROCEDURE — 4010F ACE/ARB THERAPY RXD/TAKEN: CPT | Mod: ,,, | Performed by: NURSE PRACTITIONER

## 2022-10-11 PROCEDURE — 3080F PR MOST RECENT DIASTOLIC BLOOD PRESSURE >= 90 MM HG: ICD-10-PCS | Mod: ,,, | Performed by: NURSE PRACTITIONER

## 2022-10-11 PROCEDURE — 4010F PR ACE/ARB THEARPY RXD/TAKEN: ICD-10-PCS | Mod: ,,, | Performed by: NURSE PRACTITIONER

## 2022-10-11 PROCEDURE — 3066F PR DOCUMENTATION OF TREATMENT FOR NEPHROPATHY: ICD-10-PCS | Mod: ,,, | Performed by: NURSE PRACTITIONER

## 2022-10-11 PROCEDURE — 1159F PR MEDICATION LIST DOCUMENTED IN MEDICAL RECORD: ICD-10-PCS | Mod: ,,, | Performed by: NURSE PRACTITIONER

## 2022-10-11 PROCEDURE — 3060F POS MICROALBUMINURIA REV: CPT | Mod: ,,, | Performed by: NURSE PRACTITIONER

## 2022-10-11 PROCEDURE — 3080F DIAST BP >= 90 MM HG: CPT | Mod: ,,, | Performed by: NURSE PRACTITIONER

## 2022-10-11 PROCEDURE — 3044F HG A1C LEVEL LT 7.0%: CPT | Mod: ,,, | Performed by: NURSE PRACTITIONER

## 2022-10-11 PROCEDURE — 99213 PR OFFICE/OUTPT VISIT, EST, LEVL III, 20-29 MIN: ICD-10-PCS | Mod: ,,, | Performed by: NURSE PRACTITIONER

## 2022-10-11 PROCEDURE — 3044F PR MOST RECENT HEMOGLOBIN A1C LEVEL <7.0%: ICD-10-PCS | Mod: ,,, | Performed by: NURSE PRACTITIONER

## 2022-10-11 PROCEDURE — 3060F PR POS MICROALBUMINURIA RESULT DOCUMENTED/REVIEW: ICD-10-PCS | Mod: ,,, | Performed by: NURSE PRACTITIONER

## 2022-10-11 PROCEDURE — 1159F MED LIST DOCD IN RCRD: CPT | Mod: ,,, | Performed by: NURSE PRACTITIONER

## 2022-10-11 NOTE — LETTER
October 11, 2022      [facility]  [address] [facility] [place] [address]  Phone: [phone]  Fax: [phone]       Patient: Russel Hill   YOB: 1975  Date of Visit: 10/11/2022    To Whom It May Concern:    Asher Hill  was at [facility] on 10/11/2022. The patient may return to work/school on 10/12/2022 with no restrictions. If you have any questions or concerns, or if I can be of further assistance, please do not hesitate to contact me.    Sincerely,    Alisson Marr, RT

## 2022-10-11 NOTE — PROGRESS NOTES
Subjective:       Patient ID: Russel Hill is a 46 y.o. male.    Chief Complaint: Medication Refill (On linzess 290mcg)    medication refill- Linzess- pt takes Ozempic and it caused constipation    Medication Refill  Pertinent negatives include no abdominal pain, change in bowel habit, chest pain, congestion, coughing, fatigue, fever, headaches, nausea, neck pain, rash, sore throat, vomiting or weakness.   Review of Systems   Constitutional:  Negative for appetite change, fatigue and fever.   HENT:  Negative for nasal congestion, ear pain and sore throat.    Eyes:  Negative for pain, discharge and itching.   Respiratory:  Negative for cough and shortness of breath.    Cardiovascular:  Negative for chest pain and leg swelling.   Gastrointestinal:  Negative for abdominal pain, change in bowel habit, constipation, nausea, vomiting and change in bowel habit.   Musculoskeletal:  Negative for back pain, gait problem and neck pain.   Integumentary:  Negative for rash and wound.   Neurological:  Negative for dizziness, weakness and headaches.   All other systems reviewed and are negative.      Objective:      Physical Exam  Vitals and nursing note reviewed.   Constitutional:       General: He is not in acute distress.     Appearance: Normal appearance. He is not ill-appearing, toxic-appearing or diaphoretic.   HENT:      Head: Normocephalic.      Nose: Nose normal. No congestion or rhinorrhea.      Mouth/Throat:      Mouth: Mucous membranes are moist.      Pharynx: No oropharyngeal exudate or posterior oropharyngeal erythema.   Eyes:      General: No scleral icterus.        Right eye: No discharge.         Left eye: No discharge.      Extraocular Movements: Extraocular movements intact.      Conjunctiva/sclera: Conjunctivae normal.      Pupils: Pupils are equal, round, and reactive to light.   Cardiovascular:      Rate and Rhythm: Normal rate and regular rhythm.      Pulses: Normal pulses.      Heart sounds: Normal  heart sounds. No murmur heard.  Pulmonary:      Effort: Pulmonary effort is normal. No respiratory distress.      Breath sounds: Normal breath sounds. No wheezing, rhonchi or rales.   Abdominal:      General: Bowel sounds are normal.      Palpations: Abdomen is soft.      Tenderness: There is no abdominal tenderness. There is no right CVA tenderness, left CVA tenderness, guarding or rebound.   Musculoskeletal:         General: Normal range of motion.      Cervical back: Neck supple. No tenderness.   Lymphadenopathy:      Cervical: No cervical adenopathy.   Skin:     General: Skin is warm and dry.      Capillary Refill: Capillary refill takes less than 2 seconds.      Findings: No rash.   Neurological:      Mental Status: He is alert and oriented to person, place, and time.   Psychiatric:         Mood and Affect: Mood normal.         Behavior: Behavior normal.         Thought Content: Thought content normal.         Judgment: Judgment normal.          Assessment:       1. Encounter for medication refill    2. Constipation, unspecified constipation type        Plan:   Encounter for medication refill    Constipation, unspecified constipation type  -     linaCLOtide (LINZESS) 290 mcg Cap capsule; Take 1 capsule (290 mcg total) by mouth before breakfast.  Dispense: 30 capsule; Refill: 5

## 2022-10-18 ENCOUNTER — TELEPHONE (OUTPATIENT)
Dept: FAMILY MEDICINE | Facility: CLINIC | Age: 47
End: 2022-10-18
Payer: COMMERCIAL

## 2022-10-18 DIAGNOSIS — E11.65 TYPE 2 DIABETES MELLITUS WITH HYPERGLYCEMIA, WITHOUT LONG-TERM CURRENT USE OF INSULIN: ICD-10-CM

## 2022-10-18 RX ORDER — SEMAGLUTIDE 1.34 MG/ML
1 INJECTION, SOLUTION SUBCUTANEOUS
Qty: 3 PEN | Refills: 3 | Status: SHIPPED | OUTPATIENT
Start: 2022-10-18 | End: 2023-01-23 | Stop reason: SDUPTHER

## 2022-10-18 NOTE — TELEPHONE ENCOUNTER
----- Message from Mari Tobar sent at 10/18/2022  3:51 PM CDT -----  Pt need refill on OZEMPIC sent to [facility] PT # [phone]

## 2022-10-20 ENCOUNTER — TELEPHONE (OUTPATIENT)
Dept: FAMILY MEDICINE | Facility: CLINIC | Age: 47
End: 2022-10-20
Payer: COMMERCIAL

## 2022-10-20 DIAGNOSIS — E11.9 TYPE 2 DIABETES MELLITUS WITHOUT COMPLICATION, WITHOUT LONG-TERM CURRENT USE OF INSULIN: Primary | ICD-10-CM

## 2022-10-20 NOTE — TELEPHONE ENCOUNTER
Since there will be a delay in PA, he can  a sample of 2 mg Ozempic and will dial it 37 clicks to 1 mg (it won't be marked on 2 mg pen, will have to count as he dials).  There will be enough for 8 wks in the 2 mg sample pen.

## 2022-10-20 NOTE — TELEPHONE ENCOUNTER
----- Message from Mari Tobar sent at 10/20/2022  8:08 AM CDT -----  Let pt know it was ready but he got to have a PA before he can get it and I let him know it my be little while before that done because we dont have anyone to do them in the offer right now .. he was understand about it   ----- Message -----  From: MICAELA Lowry  Sent: 10/18/2022   4:31 PM CDT  To: Mari Tobar    Let pt know refills have been sent.    ----- Message -----  From: Mari Tobar  Sent: 10/18/2022   3:53 PM CDT  To: MICAELA Lowry    Pt need refill on OZEMPIC sent to [facility] PT # [phone]

## 2022-10-24 RX ORDER — METFORMIN HYDROCHLORIDE 500 MG/1
500 TABLET ORAL
Qty: 90 TABLET | Refills: 3 | Status: SHIPPED | OUTPATIENT
Start: 2022-10-24 | End: 2023-06-19 | Stop reason: SDUPTHER

## 2022-10-24 NOTE — TELEPHONE ENCOUNTER
Sent metformin refills and have Ozempic 2 mg sample ([id number] Exp 1/31/25) signed out to him in refrigerator for .  Since he left before picking up sample, try to call him tomorrow.  Thanks!

## 2022-10-24 NOTE — TELEPHONE ENCOUNTER
Pt returned call. Gave specific instructions to count 37 clicks. Pt states he is out of metformin and needs a refill please and thank you.

## 2022-12-20 ENCOUNTER — OFFICE VISIT (OUTPATIENT)
Dept: FAMILY MEDICINE | Facility: CLINIC | Age: 47
End: 2022-12-20
Payer: COMMERCIAL

## 2022-12-20 VITALS
SYSTOLIC BLOOD PRESSURE: 110 MMHG | RESPIRATION RATE: 18 BRPM | BODY MASS INDEX: 32.49 KG/M2 | TEMPERATURE: 98 F | DIASTOLIC BLOOD PRESSURE: 62 MMHG | HEIGHT: 67 IN | OXYGEN SATURATION: 99 % | WEIGHT: 207 LBS | HEART RATE: 65 BPM

## 2022-12-20 DIAGNOSIS — Z12.11 SCREENING FOR MALIGNANT NEOPLASM OF COLON: ICD-10-CM

## 2022-12-20 DIAGNOSIS — Z79.899 ENCOUNTER FOR LONG-TERM (CURRENT) USE OF OTHER MEDICATIONS: ICD-10-CM

## 2022-12-20 DIAGNOSIS — E11.9 TYPE 2 DIABETES MELLITUS WITHOUT COMPLICATION, WITHOUT LONG-TERM CURRENT USE OF INSULIN: Primary | Chronic | ICD-10-CM

## 2022-12-20 DIAGNOSIS — E78.00 HYPERCHOLESTEREMIA: Chronic | ICD-10-CM

## 2022-12-20 DIAGNOSIS — E66.9 OBESITY, CLASS I, BMI 30-34.9: Chronic | ICD-10-CM

## 2022-12-20 DIAGNOSIS — I10 PRIMARY HYPERTENSION: Chronic | ICD-10-CM

## 2022-12-20 PROBLEM — E66.811 OBESITY, CLASS I, BMI 30-34.9: Chronic | Status: ACTIVE | Noted: 2022-06-01

## 2022-12-20 LAB
ANION GAP SERPL CALCULATED.3IONS-SCNC: 12 MMOL/L (ref 7–16)
BUN SERPL-MCNC: 20 MG/DL (ref 7–18)
BUN/CREAT SERPL: 18 (ref 6–20)
CALCIUM SERPL-MCNC: 9.5 MG/DL (ref 8.5–10.1)
CHLORIDE SERPL-SCNC: 106 MMOL/L (ref 98–107)
CO2 SERPL-SCNC: 28 MMOL/L (ref 21–32)
CREAT SERPL-MCNC: 1.11 MG/DL (ref 0.7–1.3)
CREAT UR-MCNC: 98 MG/DL (ref 39–259)
EGFR (NO RACE VARIABLE) (RUSH/TITUS): 82 ML/MIN/1.73M²
EST. AVERAGE GLUCOSE BLD GHB EST-MCNC: 100 MG/DL
GLUCOSE SERPL-MCNC: 105 MG/DL (ref 74–106)
HBA1C MFR BLD HPLC: 5.6 % (ref 4.5–6.6)
MICROALBUMIN UR-MCNC: 1 MG/DL (ref 0–2.8)
MICROALBUMIN/CREAT RATIO PNL UR: 10.2 MG/G (ref 0–30)
POTASSIUM SERPL-SCNC: 5.4 MMOL/L (ref 3.5–5.1)
SODIUM SERPL-SCNC: 141 MMOL/L (ref 136–145)

## 2022-12-20 PROCEDURE — 83036 HEMOGLOBIN GLYCOSYLATED A1C: CPT | Mod: ,,, | Performed by: CLINICAL MEDICAL LABORATORY

## 2022-12-20 PROCEDURE — 82043 UR ALBUMIN QUANTITATIVE: CPT | Mod: ,,, | Performed by: CLINICAL MEDICAL LABORATORY

## 2022-12-20 PROCEDURE — 1159F MED LIST DOCD IN RCRD: CPT | Mod: ,,, | Performed by: NURSE PRACTITIONER

## 2022-12-20 PROCEDURE — 3078F PR MOST RECENT DIASTOLIC BLOOD PRESSURE < 80 MM HG: ICD-10-PCS | Mod: ,,, | Performed by: NURSE PRACTITIONER

## 2022-12-20 PROCEDURE — 1159F PR MEDICATION LIST DOCUMENTED IN MEDICAL RECORD: ICD-10-PCS | Mod: ,,, | Performed by: NURSE PRACTITIONER

## 2022-12-20 PROCEDURE — 99214 OFFICE O/P EST MOD 30 MIN: CPT | Mod: ,,, | Performed by: NURSE PRACTITIONER

## 2022-12-20 PROCEDURE — 4010F PR ACE/ARB THEARPY RXD/TAKEN: ICD-10-PCS | Mod: ,,, | Performed by: NURSE PRACTITIONER

## 2022-12-20 PROCEDURE — 3044F HG A1C LEVEL LT 7.0%: CPT | Mod: ,,, | Performed by: NURSE PRACTITIONER

## 2022-12-20 PROCEDURE — 3078F DIAST BP <80 MM HG: CPT | Mod: ,,, | Performed by: NURSE PRACTITIONER

## 2022-12-20 PROCEDURE — 3044F PR MOST RECENT HEMOGLOBIN A1C LEVEL <7.0%: ICD-10-PCS | Mod: ,,, | Performed by: NURSE PRACTITIONER

## 2022-12-20 PROCEDURE — 99214 PR OFFICE/OUTPT VISIT, EST, LEVL IV, 30-39 MIN: ICD-10-PCS | Mod: ,,, | Performed by: NURSE PRACTITIONER

## 2022-12-20 PROCEDURE — 3060F PR POS MICROALBUMINURIA RESULT DOCUMENTED/REVIEW: ICD-10-PCS | Mod: ,,, | Performed by: NURSE PRACTITIONER

## 2022-12-20 PROCEDURE — 93000 PR ELECTROCARDIOGRAM, COMPLETE: ICD-10-PCS | Mod: ,,, | Performed by: NURSE PRACTITIONER

## 2022-12-20 PROCEDURE — 83036 HEMOGLOBIN A1C: ICD-10-PCS | Mod: ,,, | Performed by: CLINICAL MEDICAL LABORATORY

## 2022-12-20 PROCEDURE — 3074F PR MOST RECENT SYSTOLIC BLOOD PRESSURE < 130 MM HG: ICD-10-PCS | Mod: ,,, | Performed by: NURSE PRACTITIONER

## 2022-12-20 PROCEDURE — 82570 ASSAY OF URINE CREATININE: CPT | Mod: ,,, | Performed by: CLINICAL MEDICAL LABORATORY

## 2022-12-20 PROCEDURE — 82043 MICROALBUMIN / CREATININE RATIO URINE: ICD-10-PCS | Mod: ,,, | Performed by: CLINICAL MEDICAL LABORATORY

## 2022-12-20 PROCEDURE — 1160F PR REVIEW ALL MEDS BY PRESCRIBER/CLIN PHARMACIST DOCUMENTED: ICD-10-PCS | Mod: ,,, | Performed by: NURSE PRACTITIONER

## 2022-12-20 PROCEDURE — 3008F BODY MASS INDEX DOCD: CPT | Mod: ,,, | Performed by: NURSE PRACTITIONER

## 2022-12-20 PROCEDURE — 3066F PR DOCUMENTATION OF TREATMENT FOR NEPHROPATHY: ICD-10-PCS | Mod: ,,, | Performed by: NURSE PRACTITIONER

## 2022-12-20 PROCEDURE — 3074F SYST BP LT 130 MM HG: CPT | Mod: ,,, | Performed by: NURSE PRACTITIONER

## 2022-12-20 PROCEDURE — 3008F PR BODY MASS INDEX (BMI) DOCUMENTED: ICD-10-PCS | Mod: ,,, | Performed by: NURSE PRACTITIONER

## 2022-12-20 PROCEDURE — 93000 ELECTROCARDIOGRAM COMPLETE: CPT | Mod: ,,, | Performed by: NURSE PRACTITIONER

## 2022-12-20 PROCEDURE — 82570 MICROALBUMIN / CREATININE RATIO URINE: ICD-10-PCS | Mod: ,,, | Performed by: CLINICAL MEDICAL LABORATORY

## 2022-12-20 PROCEDURE — 3066F NEPHROPATHY DOC TX: CPT | Mod: ,,, | Performed by: NURSE PRACTITIONER

## 2022-12-20 PROCEDURE — 80048 BASIC METABOLIC PNL TOTAL CA: CPT | Mod: ,,, | Performed by: CLINICAL MEDICAL LABORATORY

## 2022-12-20 PROCEDURE — 80048 BASIC METABOLIC PANEL: ICD-10-PCS | Mod: ,,, | Performed by: CLINICAL MEDICAL LABORATORY

## 2022-12-20 PROCEDURE — 4010F ACE/ARB THERAPY RXD/TAKEN: CPT | Mod: ,,, | Performed by: NURSE PRACTITIONER

## 2022-12-20 PROCEDURE — 3060F POS MICROALBUMINURIA REV: CPT | Mod: ,,, | Performed by: NURSE PRACTITIONER

## 2022-12-20 PROCEDURE — 1160F RVW MEDS BY RX/DR IN RCRD: CPT | Mod: ,,, | Performed by: NURSE PRACTITIONER

## 2022-12-20 RX ORDER — PHENTERMINE HYDROCHLORIDE 37.5 MG/1
37.5 TABLET ORAL
Qty: 30 TABLET | Refills: 0 | Status: SHIPPED | OUTPATIENT
Start: 2022-12-20 | End: 2023-01-19

## 2022-12-20 NOTE — PROGRESS NOTES
[facility] - [facility]       PATIENT NAME: Russel Hill   : 1975    AGE: 47 y.o. DATE OF ENCOUNTER: 22    MRN: [id number]      PCP: MICAELA Lowry    Reason for Visit / Chief Complaint:  Diabetes, Hypertension, and Follow-up (Patient presents to clinic for 6 month follow up of HTN, DM)     Subjective:     HPI:    Presents for 6 mth f/u T2DM & HTN.    Having difficulty getting Ozempic d/t backorder - sample given of 0.5 mg bc that is all we have available.  Checking glucose occasionally & it has been good, max 120s.    Exercising, watching diet; has lost 11 more lbs in past 6 mths.  Took adipex in the past and lost about 100 lbs which he eventually regained.  Wants to try adipex again.     Linzess helped constipation at first, but lately has been taking MOM also.  Never got a call to set up screening colonoscopy.      Review of Systems:     Review of Systems   Constitutional: Negative.    HENT: Negative.     Eyes: Negative.    Respiratory: Negative.     Cardiovascular: Negative.  Negative for chest pain, palpitations and leg swelling.   Gastrointestinal:  Positive for constipation. Negative for abdominal pain and blood in stool.   Endocrine: Negative.    Genitourinary: Negative.    Musculoskeletal: Negative.    Skin: Negative.    Allergic/Immunologic: Negative.    Neurological: Negative.    Hematological: Negative.    Psychiatric/Behavioral: Negative.       Allergies:     Review of patient's allergies indicates:   Allergen Reactions    Marijuana/cannabinoid     Melon      watermellon        Labs:      Lab Results   Component Value Date    HGBA1C 5.7 2022      Lipids:   Lab Results   Component Value Date    CHOL 163 2022     Lab Results   Component Value Date    HDL 65 (H) 2022     Lab Results   Component Value Date    LDLCALC 87 2022     Lab Results   Component Value Date    TRIG 55 2022     CMP:  Sodium   Date Value Ref Range Status    06/16/2022 140 136 - 145 mmol/L Final     Potassium   Date Value Ref Range Status   06/16/2022 3.8 3.5 - 5.1 mmol/L Final     Chloride   Date Value Ref Range Status   06/16/2022 103 98 - 107 mmol/L Final     Glucose   Date Value Ref Range Status   06/16/2022 94 74 - 106 mg/dL Final     BUN   Date Value Ref Range Status   06/16/2022 32 (H) 7 - 18 mg/dL Final     Creatinine   Date Value Ref Range Status   06/16/2022 1.20 0.70 - 1.30 mg/dL Final     AST   Date Value Ref Range Status   06/16/2022 28 15 - 37 U/L Final     ALT   Date Value Ref Range Status   06/16/2022 52 16 - 61 U/L Final     eGFR    Date Value Ref Range Status   05/12/2021 113 >=60 mL/min/1.73m² Final     eGFR   Date Value Ref Range Status   06/16/2022 69 >=60 mL/min/1.73m² Final      CBC:  Lab Results   Component Value Date    WBC 8.01 01/04/2022    RBC 5.52 01/04/2022    HGB 14.0 01/04/2022    HCT 44.6 01/04/2022     01/04/2022      TSH:  Lab Results   Component Value Date    TSH 0.906 01/04/2022       Medical History:     Past Medical History:   Diagnosis Date    Constipation     Diabetes mellitus, type 2 02/12/2014    Erectile dysfunction     Hypercholesteremia 03/04/2016    Hypertension     Severe obesity (BMI >= 40) 10/01/2012      Social History     Tobacco Use   Smoking Status Never   Smokeless Tobacco Never      No past surgical history on file.     Health Maintenance:     Health Maintenance Due   Topic Date Due    Colorectal Cancer Screening  Never done    Hemoglobin A1c  12/16/2022    Diabetes Urine Screening  01/04/2023     Health Maintenance Topics with due status: Not Due       Topic Last Completion Date    Eye Exam 03/22/2022    Lipid Panel 06/16/2022    Low Dose Statin 12/20/2022    Foot Exam 12/20/2022       Objective:      Wt Readings from Last 3 Encounters:   12/20/22 0905 93.9 kg (207 lb)   10/11/22 0954 91.6 kg (202 lb)   09/01/22 1000 97.5 kg (215 lb)     Vitals:    12/20/22 0905   BP: 110/62   BP Location:  "Right arm   Patient Position: Sitting   BP Method: Large (Manual)   Pulse: 65   Resp: 18   Temp: 98 °F (36.7 °C)   TempSrc: Oral   SpO2: 99%   Weight: 93.9 kg (207 lb)   Height: 5' 7" (1.702 m)     Body mass index is 32.42 kg/m².     Physical Exam:    Physical Exam  Vitals and nursing note reviewed.   Constitutional:       Appearance: Normal appearance.   HENT:      Head: Normocephalic.   Eyes:      Conjunctiva/sclera: Conjunctivae normal.      Pupils: Pupils are equal, round, and reactive to light.   Neck:      Thyroid: No thyromegaly.      Vascular: No carotid bruit.      Trachea: Trachea normal.   Cardiovascular:      Rate and Rhythm: Normal rate and regular rhythm.      Pulses:           Dorsalis pedis pulses are 3+ on the right side and 3+ on the left side.        Posterior tibial pulses are 3+ on the right side and 3+ on the left side.      Heart sounds: Normal heart sounds.   Pulmonary:      Effort: Pulmonary effort is normal.      Breath sounds: Normal breath sounds.   Musculoskeletal:      Cervical back: Neck supple.      Right lower leg: No edema.      Left lower leg: No edema.      Right foot: Normal range of motion. No deformity or bunion.      Left foot: Normal range of motion. No deformity or bunion.   Feet:      Right foot:      Protective Sensation: 10 sites tested.  10 sites sensed.      Skin integrity: Dry skin present. No ulcer, blister, erythema, warmth, callus or fissure.      Toenail Condition: Right toenails are abnormally thick. Fungal disease present.     Left foot:      Protective Sensation: 10 sites tested.  10 sites sensed.      Skin integrity: Dry skin present. No ulcer, blister, erythema, warmth, callus or fissure.      Toenail Condition: Left toenails are abnormally thick. Fungal disease present.  Lymphadenopathy:      Cervical: No cervical adenopathy.      Upper Body:      Right upper body: No supraclavicular adenopathy.      Left upper body: No supraclavicular adenopathy.   Skin:     " General: Skin is warm and dry.      Findings: No rash.   Neurological:      General: No focal deficit present.      Mental Status: He is alert and oriented to person, place, and time.   Psychiatric:         Mood and Affect: Mood normal.         Behavior: Behavior normal.        Assessment:          ICD-10-CM ICD-9-CM   1. Type 2 diabetes mellitus without complication, without long-term current use of insulin  E11.9 250.00   2. Primary hypertension  I10 401.9   3. Hypercholesteremia  E78.00 272.0   4. Obesity, Class I, BMI 30-34.9  E66.9 278.00   5. Screening for malignant neoplasm of colon  Z12.11 V76.51   6. Encounter for long-term (current) use of other medications  Z79.899 V58.69        Plan:       Type 2 diabetes mellitus without complication, without long-term current use of insulin  -     Basic Metabolic Panel; Future; Expected date: 12/20/2022  -     Hemoglobin A1C; Future; Expected date: 12/20/2022  -     Microalbumin/Creatinine Ratio, Urine; Future; Expected date: 12/20/2022  -     POCT EKG 12-LEAD (NOT FOR [facility] USE)    Primary hypertension  -     POCT EKG 12-LEAD (NOT FOR [facility] USE)    Hypercholesteremia    Obesity, Class I, BMI 30-34.9  -     phentermine (ADIPEX-P) 37.5 mg tablet; Take 1 tablet (37.5 mg total) by mouth before breakfast.  Dispense: 30 tablet; Refill: 0    Screening for malignant neoplasm of colon  -     Ambulatory referral/consult to Gastroenterology; Future; Expected date: 12/27/2022    Encounter for long-term (current) use of other medications  -     POCT EKG 12-LEAD (NOT FOR [facility] USE)        Current Outpatient Medications:     cloNIDine (CATAPRES) 0.2 MG tablet, Take 1 tablet (0.2 mg total) by mouth every evening., Disp: 90 tablet, Rfl: 3    linaCLOtide (LINZESS) 290 mcg Cap capsule, Take 1 capsule (290 mcg total) by mouth before breakfast., Disp: 30 capsule, Rfl: 5    lisinopriL-hydrochlorothiazide (PRINZIDE,ZESTORETIC) 20-25 mg Tab, Take 1 tablet by mouth once daily., Disp: 90  tablet, Rfl: 3    metFORMIN (GLUCOPHAGE) 500 MG tablet, Take 1 tablet (500 mg total) by mouth daily with breakfast., Disp: 90 tablet, Rfl: 3    NIFEdipine (ADALAT CC) 60 MG TbSR, Take 1 tablet (60 mg total) by mouth once daily., Disp: 90 tablet, Rfl: 3    potassium chloride SA (K-DUR,KLOR-CON) 20 MEQ tablet, Take 1 tablet (20 mEq total) by mouth once daily., Disp: 90 tablet, Rfl: 1    pravastatin (PRAVACHOL) 20 MG tablet, Take 1 tablet (20 mg total) by mouth once daily., Disp: 90 tablet, Rfl: 3    triamterene-hydrochlorothiazide 37.5-25 mg (DYAZIDE) 37.5-25 mg per capsule, Take 1 capsule by mouth once daily., Disp: 90 capsule, Rfl: 3    phentermine (ADIPEX-P) 37.5 mg tablet, Take 1 tablet (37.5 mg total) by mouth before breakfast., Disp: 30 tablet, Rfl: 0    semaglutide (OZEMPIC) 1 mg/dose (4 mg/3 mL), Inject 1 mg into the skin every 7 days., Disp: 3 pen, Rfl: 3    New & refilled meds:  Requested Prescriptions     Signed Prescriptions Disp Refills    phentermine (ADIPEX-P) 37.5 mg tablet 30 tablet 0     Sig: Take 1 tablet (37.5 mg total) by mouth before breakfast.     Re-entered screening colonoscopy referral.   EKG prior to start of adipex, borderline w/o acute ST changes noted, regular rhythm    Advised adipex is intended for short term use only with max of 3 mths tx. Discussed potential risks and side effects of adipex - dry mouth, flushing/sweating, increased HR/palpitations, increased BP, or possible heart rhythm disturbance. Rx requires monthly visit to monitor weight and BP, and may be discontinued if not losing weight. Adipex must be used in conjunction with healthy, reduced calorie diet and exercise. Advised weight loss achieved with adipex will not be maintained without permanent behavioral and lifestyle changes. Drink lots of water throughout the day while taking med. Patient voices understanding and feels potential benefit outweighs risk.    Ozempic 0.5 mg weekly until 1 mg available  Labs today - will  notify when reviewed    Return to clinic 1 mth f/u adipex #1; and f/u as needed.    Future Appointments   Date Time Provider Department Center   1/23/2023  3:40 [facility]   6/19/2023  8:20 AM MICAELA Lowry [facility]Y Torres        Signature:  MICAELA Lowry

## 2022-12-20 NOTE — LETTER
December 20, 2022      [facility]  [address] [place] [address]  Phone: [phone]  Fax: [phone]       Patient: Russel Hill   YOB: 1975  Date of Visit: 12/20/2022    To Whom It May Concern:    Asher Hill  was at [facility] on 12/20/2022. The patient may return to work/school on 12/20/2022 with no restrictions. If you have any questions or concerns, or if I can be of further assistance, please do not hesitate to contact me.    Sincerely,    Lisa Diane RN

## 2022-12-29 DIAGNOSIS — Z12.11 COLON CANCER SCREENING: Primary | ICD-10-CM

## 2022-12-29 NOTE — PROGRESS NOTES
A1c is great.  Stop potassium supplement, level is now too high (please take it off med list). P/w anemia w/ differentials including iron deficient anemia as per prior anemia panel but also include GI ulcers  - FOBT +ve but rectal exam showed hard brown stool  - Prior EGD clean based ulcer; unlikely source of bleeding  - c/w full liquid diet and IV PPI 40 mg BID  GI Dr Garzon  -h/h stable post transfusion  -seems to be possibly transient bleeding? diverticular bleed?  -Dr. Garzon says nothing more can be done  H/H now stable  Palliative consult noted  -patient to go for home hospice  -needs social work and case management to work on discharge

## 2023-01-23 ENCOUNTER — OFFICE VISIT (OUTPATIENT)
Dept: FAMILY MEDICINE | Facility: CLINIC | Age: 48
End: 2023-01-23
Payer: COMMERCIAL

## 2023-01-23 VITALS
SYSTOLIC BLOOD PRESSURE: 128 MMHG | DIASTOLIC BLOOD PRESSURE: 80 MMHG | HEART RATE: 67 BPM | HEIGHT: 67 IN | WEIGHT: 214 LBS | TEMPERATURE: 99 F | RESPIRATION RATE: 18 BRPM | OXYGEN SATURATION: 97 % | BODY MASS INDEX: 33.59 KG/M2

## 2023-01-23 DIAGNOSIS — E66.9 OBESITY, CLASS I, BMI 30-34.9: Primary | Chronic | ICD-10-CM

## 2023-01-23 DIAGNOSIS — I10 PRIMARY HYPERTENSION: Chronic | ICD-10-CM

## 2023-01-23 DIAGNOSIS — E11.9 TYPE 2 DIABETES MELLITUS WITHOUT COMPLICATION, WITHOUT LONG-TERM CURRENT USE OF INSULIN: Chronic | ICD-10-CM

## 2023-01-23 DIAGNOSIS — E78.00 HYPERCHOLESTEREMIA: Chronic | ICD-10-CM

## 2023-01-23 PROBLEM — E78.2 MIXED HYPERLIPIDEMIA: Status: ACTIVE | Noted: 2023-01-23

## 2023-01-23 PROCEDURE — 1159F PR MEDICATION LIST DOCUMENTED IN MEDICAL RECORD: ICD-10-PCS | Mod: CPTII,,, | Performed by: NURSE PRACTITIONER

## 2023-01-23 PROCEDURE — 3008F BODY MASS INDEX DOCD: CPT | Mod: CPTII,,, | Performed by: NURSE PRACTITIONER

## 2023-01-23 PROCEDURE — 3008F PR BODY MASS INDEX (BMI) DOCUMENTED: ICD-10-PCS | Mod: CPTII,,, | Performed by: NURSE PRACTITIONER

## 2023-01-23 PROCEDURE — 3079F PR MOST RECENT DIASTOLIC BLOOD PRESSURE 80-89 MM HG: ICD-10-PCS | Mod: CPTII,,, | Performed by: NURSE PRACTITIONER

## 2023-01-23 PROCEDURE — 99214 OFFICE O/P EST MOD 30 MIN: CPT | Mod: ,,, | Performed by: NURSE PRACTITIONER

## 2023-01-23 PROCEDURE — 3079F DIAST BP 80-89 MM HG: CPT | Mod: CPTII,,, | Performed by: NURSE PRACTITIONER

## 2023-01-23 PROCEDURE — 3074F PR MOST RECENT SYSTOLIC BLOOD PRESSURE < 130 MM HG: ICD-10-PCS | Mod: CPTII,,, | Performed by: NURSE PRACTITIONER

## 2023-01-23 PROCEDURE — 1159F MED LIST DOCD IN RCRD: CPT | Mod: CPTII,,, | Performed by: NURSE PRACTITIONER

## 2023-01-23 PROCEDURE — 99214 PR OFFICE/OUTPT VISIT, EST, LEVL IV, 30-39 MIN: ICD-10-PCS | Mod: ,,, | Performed by: NURSE PRACTITIONER

## 2023-01-23 PROCEDURE — 3074F SYST BP LT 130 MM HG: CPT | Mod: CPTII,,, | Performed by: NURSE PRACTITIONER

## 2023-01-23 RX ORDER — SEMAGLUTIDE 1.34 MG/ML
1 INJECTION, SOLUTION SUBCUTANEOUS
Qty: 3 PEN | Refills: 3 | Status: SHIPPED | OUTPATIENT
Start: 2023-01-23 | End: 2023-06-10

## 2023-01-23 RX ORDER — PRAVASTATIN SODIUM 20 MG/1
20 TABLET ORAL DAILY
Qty: 90 TABLET | Refills: 3 | Status: SHIPPED | OUTPATIENT
Start: 2023-01-23 | End: 2023-06-19 | Stop reason: SDUPTHER

## 2023-01-23 RX ORDER — PHENTERMINE HYDROCHLORIDE 37.5 MG/1
37.5 TABLET ORAL
Qty: 30 TABLET | Refills: 0 | Status: SHIPPED | OUTPATIENT
Start: 2023-01-23 | End: 2023-02-20

## 2023-01-23 RX ORDER — PHENTERMINE HYDROCHLORIDE 37.5 MG/1
37.5 TABLET ORAL
COMMUNITY
End: 2023-01-23 | Stop reason: SDUPTHER

## 2023-01-23 NOTE — LETTER
January 23, 2023      [facility]  [address] [place] [address]  Phone: [phone]  Fax: [phone]       Patient: Russel Hill   YOB: 1975  Date of Visit: 01/23/2023    To Whom It May Concern:    Asher Hill  was at  on 01/23/2023. The patient may return to work/school on 01/24/2023 with no restrictions. If you have any questions or concerns, or if I can be of further assistance, please do not hesitate to contact me.    Sincerely,    Lisa Diane RN

## 2023-01-23 NOTE — PROGRESS NOTES
[facility] - FAMILY MEDICINE       PATIENT NAME: Russel Hill   : 1975    AGE: 47 y.o. DATE OF ENCOUNTER: 23    MRN: [id number]      PCP: MICAELA Lowry    Reason for Visit / Chief Complaint:  Follow-up (Patient presents to clinic for 1 month follow up of adipex)         274}    Subjective:     HPI:    Presents for 1 mth f/u adipex.  Requests to please try 1 more mth of adipex due to fell off the wagon this past week celebrating his GF birthday and eating more and not exercising.    T2DM - well controlled since Ozempic was added & lost wt.  Has been out of Ozempic 1 mg, took last dose of 0.5 mg sample 2 wks ago.     Review of Systems:   Review of Systems   Constitutional: Negative.    HENT: Negative.     Eyes: Negative.    Respiratory: Negative.     Cardiovascular: Negative.  Negative for chest pain, palpitations and leg swelling.   Gastrointestinal:  Positive for constipation. Negative for abdominal pain and blood in stool.   Endocrine: Negative.    Genitourinary: Negative.    Musculoskeletal: Negative.    Skin: Negative.    Allergic/Immunologic: Negative.    Neurological: Negative.    Hematological: Negative.    Psychiatric/Behavioral: Negative.       Allergies and Meds: {  ALLERGIES     KENNETH ALL REVIEWED   :[id number]}274}     Review of patient's allergies indicates:   Allergen Reactions    Marijuana/cannabinoid     Melon      watermellon        Current Outpatient Medications:     cloNIDine (CATAPRES) 0.2 MG tablet, Take 1 tablet (0.2 mg total) by mouth every evening., Disp: 90 tablet, Rfl: 3    linaCLOtide (LINZESS) 290 mcg Cap capsule, Take 1 capsule (290 mcg total) by mouth before breakfast., Disp: 30 capsule, Rfl: 5    lisinopriL-hydrochlorothiazide (PRINZIDE,ZESTORETIC) 20-25 mg Tab, Take 1 tablet by mouth once daily., Disp: 90 tablet, Rfl: 3    metFORMIN (GLUCOPHAGE) 500 MG tablet, Take 1 tablet (500 mg total) by mouth daily with breakfast., Disp: 90 tablet, Rfl:  "3    NIFEdipine (ADALAT CC) 60 MG TbSR, Take 1 tablet (60 mg total) by mouth once daily., Disp: 90 tablet, Rfl: 3    phentermine (ADIPEX-P) 37.5 mg tablet, Take 1 tablet (37.5 mg total) by mouth before breakfast., Disp: 30 tablet, Rfl: 0    pravastatin (PRAVACHOL) 20 MG tablet, Take 1 tablet (20 mg total) by mouth once daily., Disp: 90 tablet, Rfl: 3    semaglutide (OZEMPIC) 1 mg/dose (4 mg/3 mL), Inject 1 mg into the skin every 7 days., Disp: 3 pen, Rfl: 3    triamterene-hydrochlorothiazide 37.5-25 mg (DYAZIDE) 37.5-25 mg per capsule, Take 1 capsule by mouth once daily., Disp: 90 capsule, Rfl: 3    Objective:  274}   /80 (BP Location: Left arm, Patient Position: Sitting, BP Method: Large (Manual))   Pulse 67   Temp 98.7 °F (37.1 °C) (Oral)   Resp 18   Ht 5' 7" (1.702 m)   Wt 97.1 kg (214 lb)   SpO2 97%   BMI 33.52 kg/m²     Wt Readings from Last 3 Encounters:   01/23/23 97.1 kg (214 lb)   12/20/22 93.9 kg (207 lb)   10/11/22 91.6 kg (202 lb)     BP Readings from Last 3 Encounters:   01/23/23 128/80   12/20/22 110/62   10/11/22 (!) 137/90     Body mass index is 33.52 kg/m².     Physical Exam  Vitals and nursing note reviewed.   Constitutional:       General: He is not in acute distress.     Appearance: Normal appearance. He is not ill-appearing.   HENT:      Head: Normocephalic.   Eyes:      Conjunctiva/sclera: Conjunctivae normal.   Cardiovascular:      Rate and Rhythm: Normal rate and regular rhythm.      Heart sounds: Normal heart sounds.   Pulmonary:      Effort: Pulmonary effort is normal. No respiratory distress.      Breath sounds: Normal breath sounds.   Musculoskeletal:      Cervical back: Neck supple.   Skin:     General: Skin is warm and dry.   Neurological:      Mental Status: He is alert and oriented to person, place, and time.        Assessment and Plan: 274}     1. Obesity, Class I, BMI 30-34.9  Comments:  Advised will try 1 more month of Adipex but if no weight loss at follow-up will " dc.  Healthy reduced calorie diet and exercise at least 5 days per week.  Orders:  -     phentermine (ADIPEX-P) 37.5 mg tablet; Take 1 tablet (37.5 mg total) by mouth before breakfast.  Dispense: 30 tablet; Refill: 0    2. Primary hypertension  Assessment & Plan:  The current medical regimen is effective;  continue present plan and medications.        3. Hypercholesteremia  Assessment & Plan:  Advised to resume pravastatin.    Orders:  -     pravastatin (PRAVACHOL) 20 MG tablet; Take 1 tablet (20 mg total) by mouth once daily.  Dispense: 90 tablet; Refill: 3    4. Type 2 diabetes mellitus without complication, without long-term current use of insulin  Assessment & Plan:  Controlled    Orders:  -     semaglutide (OZEMPIC) 1 mg/dose (4 mg/3 mL); Inject 1 mg into the skin every 7 days.  Dispense: 3 pen; Refill: 3         Return to clinic 1 mth f/u adipex mth #2/wt management; and sooner as needed.    Future Appointments   Date Time Provider Department Center   3/1/2023  3:40 PM MICAELA Lowry [facility]   6/19/2023  8:20 AM [facility]        Signature:  MICAELA Lowry

## 2023-02-20 ENCOUNTER — OFFICE VISIT (OUTPATIENT)
Dept: FAMILY MEDICINE | Facility: CLINIC | Age: 48
End: 2023-02-20
Payer: COMMERCIAL

## 2023-02-20 VITALS
HEART RATE: 76 BPM | DIASTOLIC BLOOD PRESSURE: 62 MMHG | WEIGHT: 212.19 LBS | OXYGEN SATURATION: 98 % | BODY MASS INDEX: 33.3 KG/M2 | TEMPERATURE: 99 F | RESPIRATION RATE: 18 BRPM | SYSTOLIC BLOOD PRESSURE: 98 MMHG | HEIGHT: 67 IN

## 2023-02-20 DIAGNOSIS — Z72.51 HIGH RISK HETEROSEXUAL BEHAVIOR: ICD-10-CM

## 2023-02-20 DIAGNOSIS — R39.198 DIFFICULTY URINATING: Primary | ICD-10-CM

## 2023-02-20 DIAGNOSIS — E11.9 TYPE 2 DIABETES MELLITUS WITHOUT COMPLICATION, WITHOUT LONG-TERM CURRENT USE OF INSULIN: Chronic | ICD-10-CM

## 2023-02-20 PROBLEM — R10.32 LEFT LOWER QUADRANT ABDOMINAL PAIN: Status: RESOLVED | Noted: 2022-09-01 | Resolved: 2023-02-20

## 2023-02-20 LAB
BILIRUB UR QL STRIP: NEGATIVE
CLARITY UR: CLEAR
COLOR UR: NORMAL
GLUCOSE UR STRIP-MCNC: NORMAL MG/DL
KETONES UR STRIP-SCNC: NEGATIVE MG/DL
LEUKOCYTE ESTERASE UR QL STRIP: NEGATIVE
NITRITE UR QL STRIP: NEGATIVE
PH UR STRIP: 5.5 PH UNITS
PROT UR QL STRIP: NEGATIVE
RBC # UR STRIP: NEGATIVE /UL
SP GR UR STRIP: 1.01
UROBILINOGEN UR STRIP-ACNC: NORMAL MG/DL

## 2023-02-20 PROCEDURE — 81003 URINALYSIS, REFLEX TO URINE CULTURE: ICD-10-PCS | Mod: QW,,, | Performed by: CLINICAL MEDICAL LABORATORY

## 2023-02-20 PROCEDURE — 1159F PR MEDICATION LIST DOCUMENTED IN MEDICAL RECORD: ICD-10-PCS | Mod: CPTII,,, | Performed by: NURSE PRACTITIONER

## 2023-02-20 PROCEDURE — 1160F PR REVIEW ALL MEDS BY PRESCRIBER/CLIN PHARMACIST DOCUMENTED: ICD-10-PCS | Mod: CPTII,,, | Performed by: NURSE PRACTITIONER

## 2023-02-20 PROCEDURE — 3074F SYST BP LT 130 MM HG: CPT | Mod: CPTII,,, | Performed by: NURSE PRACTITIONER

## 2023-02-20 PROCEDURE — 87591 N.GONORRHOEAE DNA AMP PROB: CPT | Mod: ,,, | Performed by: CLINICAL MEDICAL LABORATORY

## 2023-02-20 PROCEDURE — 4010F ACE/ARB THERAPY RXD/TAKEN: CPT | Mod: CPTII,,, | Performed by: NURSE PRACTITIONER

## 2023-02-20 PROCEDURE — 99213 OFFICE O/P EST LOW 20 MIN: CPT | Mod: ,,, | Performed by: NURSE PRACTITIONER

## 2023-02-20 PROCEDURE — 87491 CHLMYD TRACH DNA AMP PROBE: CPT | Mod: ,,, | Performed by: CLINICAL MEDICAL LABORATORY

## 2023-02-20 PROCEDURE — 81003 URINALYSIS AUTO W/O SCOPE: CPT | Mod: QW,,, | Performed by: CLINICAL MEDICAL LABORATORY

## 2023-02-20 PROCEDURE — 3078F PR MOST RECENT DIASTOLIC BLOOD PRESSURE < 80 MM HG: ICD-10-PCS | Mod: CPTII,,, | Performed by: NURSE PRACTITIONER

## 2023-02-20 PROCEDURE — 3078F DIAST BP <80 MM HG: CPT | Mod: CPTII,,, | Performed by: NURSE PRACTITIONER

## 2023-02-20 PROCEDURE — 3074F PR MOST RECENT SYSTOLIC BLOOD PRESSURE < 130 MM HG: ICD-10-PCS | Mod: CPTII,,, | Performed by: NURSE PRACTITIONER

## 2023-02-20 PROCEDURE — 87591 CHLAMYDIA/GONORRHOEAE(GC), PCR: ICD-10-PCS | Mod: ,,, | Performed by: CLINICAL MEDICAL LABORATORY

## 2023-02-20 PROCEDURE — 3008F PR BODY MASS INDEX (BMI) DOCUMENTED: ICD-10-PCS | Mod: CPTII,,, | Performed by: NURSE PRACTITIONER

## 2023-02-20 PROCEDURE — 1160F RVW MEDS BY RX/DR IN RCRD: CPT | Mod: CPTII,,, | Performed by: NURSE PRACTITIONER

## 2023-02-20 PROCEDURE — 3008F BODY MASS INDEX DOCD: CPT | Mod: CPTII,,, | Performed by: NURSE PRACTITIONER

## 2023-02-20 PROCEDURE — 99213 PR OFFICE/OUTPT VISIT, EST, LEVL III, 20-29 MIN: ICD-10-PCS | Mod: ,,, | Performed by: NURSE PRACTITIONER

## 2023-02-20 PROCEDURE — 1159F MED LIST DOCD IN RCRD: CPT | Mod: CPTII,,, | Performed by: NURSE PRACTITIONER

## 2023-02-20 PROCEDURE — 87491 CHLAMYDIA/GONORRHOEAE(GC), PCR: ICD-10-PCS | Mod: ,,, | Performed by: CLINICAL MEDICAL LABORATORY

## 2023-02-20 PROCEDURE — 4010F PR ACE/ARB THEARPY RXD/TAKEN: ICD-10-PCS | Mod: CPTII,,, | Performed by: NURSE PRACTITIONER

## 2023-02-20 RX ORDER — HYDROCODONE BITARTRATE AND ACETAMINOPHEN 5; 325 MG/1; MG/1
1 TABLET ORAL EVERY 4 HOURS
COMMUNITY
Start: 2023-02-10 | End: 2023-02-20

## 2023-02-20 RX ORDER — POTASSIUM CHLORIDE 20 MEQ/1
20 TABLET, EXTENDED RELEASE ORAL DAILY
COMMUNITY
Start: 2023-02-17 | End: 2023-04-05

## 2023-02-20 NOTE — ASSESSMENT & PLAN NOTE
Lab Results   Component Value Date    HGBA1C 5.6 12/20/2022   Well controlled, continue metformin 500 mg every a.m. and Ozempic weekly.

## 2023-02-20 NOTE — PROGRESS NOTES
[facility] - FAMILY MEDICINE       PATIENT NAME: Russel Hill   : 1975    AGE: 47 y.o. DATE OF ENCOUNTER: 23    MRN: [id number]      PCP: MICAELA Lowry    Reason for Visit / Chief Complaint:  Follow-up (Patient presents to clinic to discuss referral) and Prostate Problem (Client states having difficult urination. Referral for prostate problem.)         274}    Subjective:     HPI:    Presents c/o probs with urination - weak stream, dribbling; stopped adipex and urine stream has returned to normal.  Testicles ache - for years, but more lately.  Hx genital herpes with outbreak every 5-7 mths.  Hx high risk sexual behavior; often doesn't use protection.  Declines HIV screening.    PSA Total (ng/mL)   Date Value   2022 0.455       Review of Systems:   Review of Systems   Constitutional: Negative.    Respiratory: Negative.     Cardiovascular: Negative.    Gastrointestinal: Negative.    Genitourinary:  Positive for difficulty urinating. Negative for dysuria, genital sores, penile discharge and scrotal swelling.   Skin: Negative.    Neurological: Negative.      Allergies and Meds: 274}     Review of patient's allergies indicates:   Allergen Reactions    Marijuana/cannabinoid     Melon      watermellon        Current Outpatient Medications:     cloNIDine (CATAPRES) 0.2 MG tablet, Take 1 tablet (0.2 mg total) by mouth every evening., Disp: 90 tablet, Rfl: 3    lisinopriL-hydrochlorothiazide (PRINZIDE,ZESTORETIC) 20-25 mg Tab, Take 1 tablet by mouth once daily., Disp: 90 tablet, Rfl: 3    metFORMIN (GLUCOPHAGE) 500 MG tablet, Take 1 tablet (500 mg total) by mouth daily with breakfast., Disp: 90 tablet, Rfl: 3    potassium chloride SA (K-DUR,KLOR-CON) 20 MEQ tablet, Take 20 mEq by mouth once daily., Disp: , Rfl:     pravastatin (PRAVACHOL) 20 MG tablet, Take 1 tablet (20 mg total) by mouth once daily., Disp: 90 tablet, Rfl: 3    semaglutide (OZEMPIC) 1 mg/dose (4 mg/3 mL),  "Inject 1 mg into the skin every 7 days., Disp: 3 pen, Rfl: 3    triamterene-hydrochlorothiazide 37.5-25 mg (DYAZIDE) 37.5-25 mg per capsule, Take 1 capsule by mouth once daily., Disp: 90 capsule, Rfl: 3    NIFEdipine (ADALAT CC) 60 MG TbSR, Take 1 tablet (60 mg total) by mouth once daily., Disp: 90 tablet, Rfl: 3    Medical History: 274}     Past Medical History:   Diagnosis Date    Constipation     Diabetes mellitus, type 2 02/12/2014    Erectile dysfunction     Hypercholesteremia 03/04/2016    Hypertension     Severe obesity (BMI >= 40) 10/01/2012      Social History     Tobacco Use   Smoking Status Never   Smokeless Tobacco Never      Past Surgical History:   Procedure Laterality Date    CIRCUMCISION  1996        Objective:  274}   BP 98/62 (BP Location: Left arm, Patient Position: Sitting, BP Method: Medium (Manual))   Pulse 76   Temp 98.9 °F (37.2 °C) (Oral)   Resp 18   Ht 5' 7" (1.702 m)   Wt 96.3 kg (212 lb 3.2 oz)   SpO2 98%   BMI 33.24 kg/m²     Wt Readings from Last 3 Encounters:   02/20/23 96.3 kg (212 lb 3.2 oz)   01/23/23 97.1 kg (214 lb)   12/20/22 93.9 kg (207 lb)     BP Readings from Last 3 Encounters:   02/20/23 98/62   01/23/23 128/80   12/20/22 110/62     Body mass index is 33.24 kg/m².     Physical Exam  Vitals and nursing note reviewed.   Constitutional:       General: He is not in acute distress.     Appearance: Normal appearance. He is not ill-appearing.   HENT:      Head: Normocephalic.   Eyes:      Conjunctiva/sclera: Conjunctivae normal.   Cardiovascular:      Rate and Rhythm: Normal rate and regular rhythm.      Heart sounds: Normal heart sounds.   Pulmonary:      Effort: Pulmonary effort is normal. No respiratory distress.      Breath sounds: Normal breath sounds.   Abdominal:      Palpations: Abdomen is soft.      Tenderness: There is no abdominal tenderness.   Genitourinary:     Penis: Normal.       Testes: Normal.   Musculoskeletal:      Cervical back: Neck supple.   Skin:     " General: Skin is warm and dry.   Neurological:      Mental Status: He is alert and oriented to person, place, and time.        Assessment and Plan: 274}     1. Difficulty urinating  Comments:  Retention, weak stream, and dribbling improved since stopping phentermine.  Orders:  -     Urinalysis, Reflex to Urine Culture; Future; Expected date: 02/20/2023    2. High risk heterosexual behavior  -     Urinalysis, Reflex to Urine Culture; Future; Expected date: 02/20/2023  -     Chlamydia/GC, PCR; Future; Expected date: 02/20/2023    3. Type 2 diabetes mellitus without complication, without long-term current use of insulin  Assessment & Plan:  Lab Results   Component Value Date    HGBA1C 5.6 12/20/2022   Well controlled, continue metformin 500 mg every a.m. and Ozempic weekly.         Return to clinic as scheduled; and sooner as needed.    Future Appointments   Date Time Provider Department Center   3/1/2023  3:40 PM MICAELA Lowry   6/19/2023  8:20 AM MICAELA Lowry [facility]        Signature:  MICAELA Lowry

## 2023-02-21 LAB
CHLAMYDIA BY PCR: NEGATIVE
N. GONORRHOEAE (GC) BY PCR: NEGATIVE

## 2023-03-01 ENCOUNTER — OFFICE VISIT (OUTPATIENT)
Dept: FAMILY MEDICINE | Facility: CLINIC | Age: 48
End: 2023-03-01
Payer: COMMERCIAL

## 2023-03-01 VITALS
WEIGHT: 211.63 LBS | BODY MASS INDEX: 33.21 KG/M2 | HEIGHT: 67 IN | SYSTOLIC BLOOD PRESSURE: 110 MMHG | RESPIRATION RATE: 20 BRPM | DIASTOLIC BLOOD PRESSURE: 76 MMHG | TEMPERATURE: 98 F | OXYGEN SATURATION: 98 % | HEART RATE: 76 BPM

## 2023-03-01 DIAGNOSIS — I10 PRIMARY HYPERTENSION: Chronic | ICD-10-CM

## 2023-03-01 DIAGNOSIS — E66.9 OBESITY, CLASS I, BMI 30-34.9: Primary | Chronic | ICD-10-CM

## 2023-03-01 DIAGNOSIS — E11.9 TYPE 2 DIABETES MELLITUS WITHOUT COMPLICATION, WITHOUT LONG-TERM CURRENT USE OF INSULIN: Chronic | ICD-10-CM

## 2023-03-01 DIAGNOSIS — J30.1 SEASONAL ALLERGIC RHINITIS DUE TO POLLEN: ICD-10-CM

## 2023-03-01 PROBLEM — R39.198 DIFFICULTY URINATING: Status: RESOLVED | Noted: 2023-02-20 | Resolved: 2023-03-01

## 2023-03-01 PROCEDURE — 1160F PR REVIEW ALL MEDS BY PRESCRIBER/CLIN PHARMACIST DOCUMENTED: ICD-10-PCS | Mod: CPTII,,, | Performed by: NURSE PRACTITIONER

## 2023-03-01 PROCEDURE — 3074F PR MOST RECENT SYSTOLIC BLOOD PRESSURE < 130 MM HG: ICD-10-PCS | Mod: CPTII,,, | Performed by: NURSE PRACTITIONER

## 2023-03-01 PROCEDURE — 99213 OFFICE O/P EST LOW 20 MIN: CPT | Mod: ,,, | Performed by: NURSE PRACTITIONER

## 2023-03-01 PROCEDURE — 1160F RVW MEDS BY RX/DR IN RCRD: CPT | Mod: CPTII,,, | Performed by: NURSE PRACTITIONER

## 2023-03-01 PROCEDURE — 3008F BODY MASS INDEX DOCD: CPT | Mod: CPTII,,, | Performed by: NURSE PRACTITIONER

## 2023-03-01 PROCEDURE — 1159F PR MEDICATION LIST DOCUMENTED IN MEDICAL RECORD: ICD-10-PCS | Mod: CPTII,,, | Performed by: NURSE PRACTITIONER

## 2023-03-01 PROCEDURE — 4010F PR ACE/ARB THEARPY RXD/TAKEN: ICD-10-PCS | Mod: CPTII,,, | Performed by: NURSE PRACTITIONER

## 2023-03-01 PROCEDURE — 99213 PR OFFICE/OUTPT VISIT, EST, LEVL III, 20-29 MIN: ICD-10-PCS | Mod: ,,, | Performed by: NURSE PRACTITIONER

## 2023-03-01 PROCEDURE — 4010F ACE/ARB THERAPY RXD/TAKEN: CPT | Mod: CPTII,,, | Performed by: NURSE PRACTITIONER

## 2023-03-01 PROCEDURE — 1159F MED LIST DOCD IN RCRD: CPT | Mod: CPTII,,, | Performed by: NURSE PRACTITIONER

## 2023-03-01 PROCEDURE — 3074F SYST BP LT 130 MM HG: CPT | Mod: CPTII,,, | Performed by: NURSE PRACTITIONER

## 2023-03-01 PROCEDURE — 3078F PR MOST RECENT DIASTOLIC BLOOD PRESSURE < 80 MM HG: ICD-10-PCS | Mod: CPTII,,, | Performed by: NURSE PRACTITIONER

## 2023-03-01 PROCEDURE — 3078F DIAST BP <80 MM HG: CPT | Mod: CPTII,,, | Performed by: NURSE PRACTITIONER

## 2023-03-01 PROCEDURE — 3008F PR BODY MASS INDEX (BMI) DOCUMENTED: ICD-10-PCS | Mod: CPTII,,, | Performed by: NURSE PRACTITIONER

## 2023-03-01 NOTE — PROGRESS NOTES
[facility] - FAMILY MEDICINE       PATIENT NAME: Russel Hill   : 1975    AGE: 47 y.o. DATE OF ENCOUNTER: 3/1/23    MRN: [id number]      PCP: MICAELA Lowry    Reason for Visit / Chief Complaint:  Obesity (Patient presents to clinic for 1 month weight management follow up )         274}    Subjective:     HPI:    Presents for weight management f/u.  Had to discontinue Adipex due to  symptoms that have completely resolved since cessation of med.    Going to gym doing cardio on treadmill and some weight training.  Still working on diet, loves rice, eats a lot of sandwiches; drinks liquor mixed with red bull 1 day each weekend while hanging out with friends.    Review of Systems:   Review of Systems   Constitutional: Negative.    HENT:  Positive for congestion and postnasal drip. Negative for ear pain and sore throat.    Respiratory:  Positive for cough. Negative for shortness of breath and wheezing.    Genitourinary: Negative.    Neurological: Negative.      Allergies and Meds: 274}     Review of patient's allergies indicates:   Allergen Reactions    Marijuana/cannabinoid     Melon      watermellon        Current Outpatient Medications:     cloNIDine (CATAPRES) 0.2 MG tablet, Take 1 tablet (0.2 mg total) by mouth every evening., Disp: 90 tablet, Rfl: 3    lisinopriL-hydrochlorothiazide (PRINZIDE,ZESTORETIC) 20-25 mg Tab, Take 1 tablet by mouth once daily., Disp: 90 tablet, Rfl: 3    metFORMIN (GLUCOPHAGE) 500 MG tablet, Take 1 tablet (500 mg total) by mouth daily with breakfast., Disp: 90 tablet, Rfl: 3    NIFEdipine (ADALAT CC) 60 MG TbSR, Take 1 tablet (60 mg total) by mouth once daily., Disp: 90 tablet, Rfl: 3    potassium chloride SA (K-DUR,KLOR-CON) 20 MEQ tablet, Take 20 mEq by mouth once daily., Disp: , Rfl:     pravastatin (PRAVACHOL) 20 MG tablet, Take 1 tablet (20 mg total) by mouth once daily., Disp: 90 tablet, Rfl: 3    semaglutide (OZEMPIC) 1 mg/dose (4 mg/3  "mL), Inject 1 mg into the skin every 7 days., Disp: 3 pen, Rfl: 3    triamterene-hydrochlorothiazide 37.5-25 mg (DYAZIDE) 37.5-25 mg per capsule, Take 1 capsule by mouth once daily., Disp: 90 capsule, Rfl: 3    Medical History: 274}     Past Medical History:   Diagnosis Date    Constipation     Diabetes mellitus, type 2 02/12/2014    Erectile dysfunction     Hypercholesteremia 03/04/2016    Hypertension     Severe obesity (BMI >= 40) 10/01/2012      Social History     Tobacco Use   Smoking Status Never   Smokeless Tobacco Never      Objective:  274}   /76 (BP Location: Right arm, Patient Position: Sitting, BP Method: Medium (Manual))   Pulse 76   Temp 98.2 °F (36.8 °C) (Oral)   Resp 20   Ht 5' 7" (1.702 m)   Wt 96 kg (211 lb 9.6 oz)   SpO2 98%   BMI 33.14 kg/m²     Wt Readings from Last 3 Encounters:   03/01/23 96 kg (211 lb 9.6 oz)   02/20/23 96.3 kg (212 lb 3.2 oz)   01/23/23 97.1 kg (214 lb)     BP Readings from Last 3 Encounters:   03/01/23 110/76   02/20/23 98/62   01/23/23 128/80     Body mass index is 33.14 kg/m².     Physical Exam  Vitals and nursing note reviewed.   Constitutional:       General: He is not in acute distress.     Appearance: Normal appearance.   HENT:      Head: Normocephalic.      Right Ear: Tympanic membrane, ear canal and external ear normal.      Left Ear: Tympanic membrane, ear canal and external ear normal.      Nose: Mucosal edema and rhinorrhea present. Rhinorrhea is clear.   Eyes:      Conjunctiva/sclera: Conjunctivae normal.   Neck:      Thyroid: No thyromegaly or thyroid tenderness.      Trachea: Trachea normal.   Cardiovascular:      Rate and Rhythm: Normal rate and regular rhythm.      Pulses: Normal pulses.      Heart sounds: Normal heart sounds.   Pulmonary:      Effort: Pulmonary effort is normal. No respiratory distress.      Breath sounds: Normal breath sounds.   Musculoskeletal:      Cervical back: Neck supple.      Right lower leg: No edema.      Left lower " leg: No edema.   Lymphadenopathy:      Cervical: No cervical adenopathy.   Skin:     General: Skin is warm and dry.   Neurological:      Mental Status: He is alert and oriented to person, place, and time.   Psychiatric:         Mood and Affect: Mood normal.         Behavior: Behavior normal.        Assessment and Plan: 274}     1. Obesity, Class I, BMI 30-34.9  Comments:  Reviewed healthier choices/options to help with weight loss and glucose control.  Discussed the importance of muscle confusion and varying CV exercise.    2. Type 2 diabetes mellitus without complication, without long-term current use of insulin  Comments:  Well controlled with Ozempic and metformin and sustained weight loss.    3. Primary hypertension    4. Seasonal allergic rhinitis due to pollen  Comments:  Recommended Flonase or Nasacort nasal spray.  Can also use Coricidin HBP products as needed for symptoms.       Return to clinic in June as scheduled for T2DM; and sooner as needed.    Future Appointments   Date Time Provider Department Center   6/19/2023  8:20 AM MICAELA Lowry [facility]        Signature:  MICAELA Lowry

## 2023-03-01 NOTE — LETTER
March 1, 2023      [facility] - [facility]  [address] [place] [address]  Phone: [phone]  Fax: [phone]       Patient: Russel Hill   YOB: 1975  Date of Visit: 03/01/2023    To Whom It May Concern:    Asher Hill  was at [facility] on 03/01/2023. The patient may return to work/school on 03/02/2023 with no restrictions. If you have any questions or concerns, or if I can be of further assistance, please do not hesitate to contact me.    Sincerely,    Lisa Diane RN

## 2023-04-20 ENCOUNTER — TELEPHONE (OUTPATIENT)
Dept: FAMILY MEDICINE | Facility: CLINIC | Age: 48
End: 2023-04-20
Payer: COMMERCIAL

## 2023-04-20 NOTE — TELEPHONE ENCOUNTER
Ozempic approval through 4/11/2024 or until medication is no longer available under benefit plan.  Left voicemail

## 2023-06-09 DIAGNOSIS — E11.9 TYPE 2 DIABETES MELLITUS WITHOUT COMPLICATION, WITHOUT LONG-TERM CURRENT USE OF INSULIN: Chronic | ICD-10-CM

## 2023-06-10 RX ORDER — SEMAGLUTIDE 1.34 MG/ML
1 INJECTION, SOLUTION SUBCUTANEOUS
Qty: 9 ML | Refills: 3 | Status: SHIPPED | OUTPATIENT
Start: 2023-06-10 | End: 2024-06-09

## 2023-06-19 ENCOUNTER — OFFICE VISIT (OUTPATIENT)
Dept: FAMILY MEDICINE | Facility: CLINIC | Age: 48
End: 2023-06-19
Payer: COMMERCIAL

## 2023-06-19 VITALS
DIASTOLIC BLOOD PRESSURE: 68 MMHG | RESPIRATION RATE: 20 BRPM | HEIGHT: 67 IN | HEART RATE: 71 BPM | BODY MASS INDEX: 34.37 KG/M2 | SYSTOLIC BLOOD PRESSURE: 116 MMHG | OXYGEN SATURATION: 99 % | WEIGHT: 219 LBS | TEMPERATURE: 98 F

## 2023-06-19 DIAGNOSIS — Z12.11 SCREENING FOR COLON CANCER: Primary | ICD-10-CM

## 2023-06-19 DIAGNOSIS — M79.671 PAIN OF RIGHT HEEL: ICD-10-CM

## 2023-06-19 DIAGNOSIS — Z13.220 SCREENING FOR HYPERLIPIDEMIA: ICD-10-CM

## 2023-06-19 DIAGNOSIS — E11.9 TYPE 2 DIABETES MELLITUS WITHOUT COMPLICATION, WITHOUT LONG-TERM CURRENT USE OF INSULIN: Chronic | ICD-10-CM

## 2023-06-19 DIAGNOSIS — E66.9 OBESITY, CLASS I, BMI 30-34.9: Chronic | ICD-10-CM

## 2023-06-19 DIAGNOSIS — E78.00 HYPERCHOLESTEREMIA: Chronic | ICD-10-CM

## 2023-06-19 DIAGNOSIS — Z13.1 DIABETES MELLITUS SCREENING: ICD-10-CM

## 2023-06-19 DIAGNOSIS — Z23 NEED FOR PNEUMOCOCCAL VACCINE: ICD-10-CM

## 2023-06-19 DIAGNOSIS — Z12.11 SCREENING FOR MALIGNANT NEOPLASM OF COLON: ICD-10-CM

## 2023-06-19 DIAGNOSIS — I10 PRIMARY HYPERTENSION: Chronic | ICD-10-CM

## 2023-06-19 DIAGNOSIS — Z12.5 PROSTATE CANCER SCREENING: ICD-10-CM

## 2023-06-19 DIAGNOSIS — Z00.00 ROUTINE GENERAL MEDICAL EXAMINATION AT A HEALTH CARE FACILITY: Primary | ICD-10-CM

## 2023-06-19 DIAGNOSIS — R30.0 DYSURIA: ICD-10-CM

## 2023-06-19 DIAGNOSIS — E87.6 HYPOKALEMIA: ICD-10-CM

## 2023-06-19 DIAGNOSIS — K59.04 CHRONIC IDIOPATHIC CONSTIPATION: Chronic | ICD-10-CM

## 2023-06-19 LAB
ALBUMIN SERPL BCP-MCNC: 3.8 G/DL (ref 3.5–5)
ALBUMIN/GLOB SERPL: 1.1 {RATIO}
ALP SERPL-CCNC: 64 U/L (ref 45–115)
ALT SERPL W P-5'-P-CCNC: 59 U/L (ref 16–61)
ANION GAP SERPL CALCULATED.3IONS-SCNC: 11 MMOL/L (ref 7–16)
AST SERPL W P-5'-P-CCNC: 23 U/L (ref 15–37)
BILIRUB SERPL-MCNC: 0.9 MG/DL (ref ?–1.2)
BILIRUB UR QL STRIP: NEGATIVE
BUN SERPL-MCNC: 18 MG/DL (ref 7–18)
BUN/CREAT SERPL: 18 (ref 6–20)
CALCIUM SERPL-MCNC: 9.4 MG/DL (ref 8.5–10.1)
CHLORIDE SERPL-SCNC: 108 MMOL/L (ref 98–107)
CHOLEST SERPL-MCNC: 148 MG/DL (ref 0–200)
CHOLEST/HDLC SERPL: 1.9 {RATIO}
CLARITY UR: CLEAR
CO2 SERPL-SCNC: 27 MMOL/L (ref 21–32)
COLOR UR: ABNORMAL
CREAT SERPL-MCNC: 0.98 MG/DL (ref 0.7–1.3)
EGFR (NO RACE VARIABLE) (RUSH/TITUS): 96 ML/MIN/1.73M2
EST. AVERAGE GLUCOSE BLD GHB EST-MCNC: 107 MG/DL
GLOBULIN SER-MCNC: 3.6 G/DL (ref 2–4)
GLUCOSE SERPL-MCNC: 97 MG/DL (ref 74–106)
GLUCOSE UR STRIP-MCNC: NORMAL MG/DL
HBA1C MFR BLD HPLC: 5.8 % (ref 4.5–6.6)
HDLC SERPL-MCNC: 76 MG/DL (ref 40–60)
KETONES UR STRIP-SCNC: NEGATIVE MG/DL
LDLC SERPL CALC-MCNC: 58 MG/DL
LEUKOCYTE ESTERASE UR QL STRIP: NEGATIVE
NITRITE UR QL STRIP: NEGATIVE
NONHDLC SERPL-MCNC: 72 MG/DL
PH UR STRIP: 6 PH UNITS
POTASSIUM SERPL-SCNC: 3.9 MMOL/L (ref 3.5–5.1)
PROT SERPL-MCNC: 7.4 G/DL (ref 6.4–8.2)
PROT UR QL STRIP: 10
PSA SERPL-MCNC: 0.44 NG/ML
RBC # UR STRIP: NEGATIVE /UL
SODIUM SERPL-SCNC: 142 MMOL/L (ref 136–145)
SP GR UR STRIP: 1.02
TRIGL SERPL-MCNC: 71 MG/DL (ref 35–150)
UROBILINOGEN UR STRIP-ACNC: NORMAL MG/DL
VLDLC SERPL-MCNC: 14 MG/DL

## 2023-06-19 PROCEDURE — 81003 URINALYSIS, REFLEX TO URINE CULTURE: ICD-10-PCS | Mod: QW,,, | Performed by: CLINICAL MEDICAL LABORATORY

## 2023-06-19 PROCEDURE — 3074F PR MOST RECENT SYSTOLIC BLOOD PRESSURE < 130 MM HG: ICD-10-PCS | Mod: CPTII,,, | Performed by: NURSE PRACTITIONER

## 2023-06-19 PROCEDURE — G0103 PSA SCREENING: HCPCS | Mod: ,,, | Performed by: CLINICAL MEDICAL LABORATORY

## 2023-06-19 PROCEDURE — 1160F PR REVIEW ALL MEDS BY PRESCRIBER/CLIN PHARMACIST DOCUMENTED: ICD-10-PCS | Mod: CPTII,,, | Performed by: NURSE PRACTITIONER

## 2023-06-19 PROCEDURE — 4010F ACE/ARB THERAPY RXD/TAKEN: CPT | Mod: CPTII,,, | Performed by: NURSE PRACTITIONER

## 2023-06-19 PROCEDURE — 99396 PR PREVENTIVE VISIT,EST,40-64: ICD-10-PCS | Mod: 25,,, | Performed by: NURSE PRACTITIONER

## 2023-06-19 PROCEDURE — G0103 PSA, SCREENING: ICD-10-PCS | Mod: ,,, | Performed by: CLINICAL MEDICAL LABORATORY

## 2023-06-19 PROCEDURE — 90471 IMMUNIZATION ADMIN: CPT | Mod: ,,, | Performed by: NURSE PRACTITIONER

## 2023-06-19 PROCEDURE — 1159F MED LIST DOCD IN RCRD: CPT | Mod: CPTII,,, | Performed by: NURSE PRACTITIONER

## 2023-06-19 PROCEDURE — 3074F SYST BP LT 130 MM HG: CPT | Mod: CPTII,,, | Performed by: NURSE PRACTITIONER

## 2023-06-19 PROCEDURE — 80061 LIPID PANEL: CPT | Mod: ,,, | Performed by: CLINICAL MEDICAL LABORATORY

## 2023-06-19 PROCEDURE — 99396 PREV VISIT EST AGE 40-64: CPT | Mod: 25,,, | Performed by: NURSE PRACTITIONER

## 2023-06-19 PROCEDURE — 90677 PNEUMOCOCCAL CONJUGATE VACCINE 20-VALENT: ICD-10-PCS | Mod: ,,, | Performed by: NURSE PRACTITIONER

## 2023-06-19 PROCEDURE — 81003 URINALYSIS AUTO W/O SCOPE: CPT | Mod: QW,,, | Performed by: CLINICAL MEDICAL LABORATORY

## 2023-06-19 PROCEDURE — 80053 COMPREHENSIVE METABOLIC PANEL: ICD-10-PCS | Mod: ,,, | Performed by: CLINICAL MEDICAL LABORATORY

## 2023-06-19 PROCEDURE — 80061 LIPID PANEL: ICD-10-PCS | Mod: ,,, | Performed by: CLINICAL MEDICAL LABORATORY

## 2023-06-19 PROCEDURE — 90677 PCV20 VACCINE IM: CPT | Mod: ,,, | Performed by: NURSE PRACTITIONER

## 2023-06-19 PROCEDURE — 3008F BODY MASS INDEX DOCD: CPT | Mod: CPTII,,, | Performed by: NURSE PRACTITIONER

## 2023-06-19 PROCEDURE — 83036 HEMOGLOBIN A1C: ICD-10-PCS | Mod: ,,, | Performed by: CLINICAL MEDICAL LABORATORY

## 2023-06-19 PROCEDURE — 80053 COMPREHEN METABOLIC PANEL: CPT | Mod: ,,, | Performed by: CLINICAL MEDICAL LABORATORY

## 2023-06-19 PROCEDURE — 90471 PNEUMOCOCCAL CONJUGATE VACCINE 20-VALENT: ICD-10-PCS | Mod: ,,, | Performed by: NURSE PRACTITIONER

## 2023-06-19 PROCEDURE — 1159F PR MEDICATION LIST DOCUMENTED IN MEDICAL RECORD: ICD-10-PCS | Mod: CPTII,,, | Performed by: NURSE PRACTITIONER

## 2023-06-19 PROCEDURE — 4010F PR ACE/ARB THEARPY RXD/TAKEN: ICD-10-PCS | Mod: CPTII,,, | Performed by: NURSE PRACTITIONER

## 2023-06-19 PROCEDURE — 3078F DIAST BP <80 MM HG: CPT | Mod: CPTII,,, | Performed by: NURSE PRACTITIONER

## 2023-06-19 PROCEDURE — 1160F RVW MEDS BY RX/DR IN RCRD: CPT | Mod: CPTII,,, | Performed by: NURSE PRACTITIONER

## 2023-06-19 PROCEDURE — 3078F PR MOST RECENT DIASTOLIC BLOOD PRESSURE < 80 MM HG: ICD-10-PCS | Mod: CPTII,,, | Performed by: NURSE PRACTITIONER

## 2023-06-19 PROCEDURE — 83036 HEMOGLOBIN GLYCOSYLATED A1C: CPT | Mod: ,,, | Performed by: CLINICAL MEDICAL LABORATORY

## 2023-06-19 PROCEDURE — 3008F PR BODY MASS INDEX (BMI) DOCUMENTED: ICD-10-PCS | Mod: CPTII,,, | Performed by: NURSE PRACTITIONER

## 2023-06-19 RX ORDER — NIFEDIPINE 60 MG/1
60 TABLET, EXTENDED RELEASE ORAL DAILY
Qty: 90 TABLET | Refills: 3 | Status: SHIPPED | OUTPATIENT
Start: 2023-06-19 | End: 2024-06-18

## 2023-06-19 RX ORDER — TRIAMTERENE AND HYDROCHLOROTHIAZIDE 37.5; 25 MG/1; MG/1
1 CAPSULE ORAL DAILY
Qty: 90 CAPSULE | Refills: 3 | Status: SHIPPED | OUTPATIENT
Start: 2023-06-19 | End: 2024-06-18

## 2023-06-19 RX ORDER — METFORMIN HYDROCHLORIDE 500 MG/1
500 TABLET ORAL
Qty: 90 TABLET | Refills: 3 | Status: SHIPPED | OUTPATIENT
Start: 2023-06-19 | End: 2024-06-18

## 2023-06-19 RX ORDER — POLYETHYLENE GLYCOL 3350 17 G/17G
17 POWDER, FOR SOLUTION ORAL 2 TIMES DAILY
Qty: 1020 G | Refills: 5 | Status: ON HOLD | OUTPATIENT
Start: 2023-06-19 | End: 2023-11-16 | Stop reason: ALTCHOICE

## 2023-06-19 RX ORDER — LISINOPRIL 20 MG/1
20 TABLET ORAL DAILY
Qty: 90 TABLET | Refills: 3 | Status: SHIPPED | OUTPATIENT
Start: 2023-06-19 | End: 2024-06-18

## 2023-06-19 RX ORDER — CLONIDINE HYDROCHLORIDE 0.2 MG/1
0.2 TABLET ORAL NIGHTLY
Qty: 90 TABLET | Refills: 3 | Status: SHIPPED | OUTPATIENT
Start: 2023-06-19 | End: 2024-06-18

## 2023-06-19 RX ORDER — PRAVASTATIN SODIUM 20 MG/1
20 TABLET ORAL DAILY
Qty: 90 TABLET | Refills: 3 | Status: SHIPPED | OUTPATIENT
Start: 2023-06-19

## 2023-06-19 RX ORDER — LISINOPRIL AND HYDROCHLOROTHIAZIDE 20; 25 MG/1; MG/1
1 TABLET ORAL DAILY
Qty: 90 TABLET | Refills: 3 | Status: CANCELLED | OUTPATIENT
Start: 2023-06-19 | End: 2024-06-18

## 2023-06-19 RX ORDER — POTASSIUM CHLORIDE 20 MEQ/1
20 TABLET, EXTENDED RELEASE ORAL DAILY
Qty: 90 TABLET | Refills: 3 | Status: SHIPPED | OUTPATIENT
Start: 2023-06-19

## 2023-06-19 NOTE — PROGRESS NOTES
[facility] - FAMILY MEDICINE       PATIENT NAME: Russel Hill   : 1975    AGE: 47 y.o. DATE OF ENCOUNTER: 23    MRN: [id number]      PCP: MICAELA Lowry    Reason for Visit / Chief Complaint:  [facility] wellness (Patient presents to the clinic a [facility] wellness)         274}    Subjective:     HPI:    Presents for yearly wellness visit and routine 6 mth f/u T2DM.  C/o has regained some weight, but hasn't been exercising as previously was    R heel pain x 2 mths, gets better after being up on feet for a while then throbs after he lays down  Constipation since being on Ozempic; Linzess 290 mcg stopped working    Reports never heard from GI with appt for screening colonoscopy.    Review of Systems:   Review of Systems   Constitutional: Negative.    HENT: Negative.     Eyes: Negative.    Respiratory: Negative.     Cardiovascular: Negative.  Negative for chest pain, palpitations and leg swelling.   Gastrointestinal:  Positive for constipation. Negative for abdominal pain and blood in stool.   Endocrine: Negative.    Genitourinary:  Positive for difficulty urinating (intermittent burning).   Musculoskeletal: Negative.    Skin: Negative.    Allergic/Immunologic: Negative.    Neurological: Negative.    Hematological: Negative.    Psychiatric/Behavioral: Negative.       Allergies and Meds: 274}     Review of patient's allergies indicates:   Allergen Reactions    Marijuana/cannabinoid     Melon      watermellon        Current Outpatient Medications:     semaglutide (OZEMPIC) 1 mg/dose (4 mg/3 mL), Inject 1 mg into the skin every 7 days., Disp: 9 mL, Rfl: 3    cloNIDine (CATAPRES) 0.2 MG tablet, Take 1 tablet (0.2 mg total) by mouth every evening., Disp: 90 tablet, Rfl: 3    lisinopriL (PRINIVIL,ZESTRIL) 20 MG tablet, Take 1 tablet (20 mg total) by mouth once daily., Disp: 90 tablet, Rfl: 3    metFORMIN (GLUCOPHAGE) 500 MG tablet, Take 1 tablet (500 mg total) by mouth daily with  breakfast., Disp: 90 tablet, Rfl: 3    NIFEdipine (ADALAT CC) 60 MG TbSR, Take 1 tablet (60 mg total) by mouth once daily., Disp: 90 tablet, Rfl: 3    polyethylene glycol (GLYCOLAX) 17 gram/dose powder, Take 17 g by mouth 2 (two) times daily., Disp: 1020 g, Rfl: 5    potassium chloride SA (K-DUR,KLOR-CON) 20 MEQ tablet, Take 1 tablet (20 mEq total) by mouth once daily., Disp: 90 tablet, Rfl: 3    pravastatin (PRAVACHOL) 20 MG tablet, Take 1 tablet (20 mg total) by mouth once daily., Disp: 90 tablet, Rfl: 3    triamterene-hydrochlorothiazide 37.5-25 mg (DYAZIDE) 37.5-25 mg per capsule, Take 1 capsule by mouth once daily., Disp: 90 capsule, Rfl: 3    Labs:274}    I have reviewed old labs below:  Lab Results   Component Value Date    WBC 8.01 01/04/2022    RBC 5.52 01/04/2022    HGB 14.0 01/04/2022    HCT 44.6 01/04/2022     01/04/2022     12/20/2022    K 5.4 (H) 12/20/2022     12/20/2022    CALCIUM 9.5 12/20/2022     12/20/2022    BUN 20 (H) 12/20/2022    CREATININE 1.11 12/20/2022    ESTGFRAFRICA 113 05/12/2021    EGFRNONAA 69 06/16/2022    ALT 52 06/16/2022    AST 28 06/16/2022    CHOL 163 06/16/2022    TRIG 55 06/16/2022    HDL 65 (H) 06/16/2022    LDLCALC 87 06/16/2022    TSH 0.906 01/04/2022    PSA 0.455 06/16/2022    HGBA1C 5.6 12/20/2022    MICROALBUR 1.0 12/20/2022       Medical History: 274}     Past Medical History:   Diagnosis Date    Constipation     Diabetes mellitus, type 2 02/12/2014    Erectile dysfunction     Hypercholesteremia 03/04/2016    Hypertension     Severe obesity (BMI >= 40) 10/01/2012      Social History     Tobacco Use   Smoking Status Never   Smokeless Tobacco Never      Past Surgical History:   Procedure Laterality Date    CIRCUMCISION  1996        Health Maintenance: 274}     Health Maintenance         Date Due Completion Date    TETANUS VACCINE Never done ---    Colorectal Cancer Screening Never done ---    Eye Exam 03/22/2023 3/22/2022    Override on  "3/22/2022: Done ([facility])    Lipid Panel 06/16/2023 6/16/2022    Hemoglobin A1c 06/20/2023 12/20/2022    HIV Screening 06/16/2028 (Originally 10/18/1990) ---    Diabetes Urine Screening 12/20/2023 12/20/2022    Foot Exam 12/20/2023 12/20/2022    Low Dose Statin 06/19/2024 6/19/2023            Objective:  274}   /68 (BP Location: Right arm, Patient Position: Sitting, BP Method: Large (Automatic))   Pulse 71   Temp 97.9 °F (36.6 °C) (Oral)   Resp 20   Ht 5' 7" (1.702 m)   Wt 99.3 kg (219 lb)   SpO2 99%   BMI 34.30 kg/m²     Wt Readings from Last 3 Encounters:   06/19/23 99.3 kg (219 lb)   03/01/23 96 kg (211 lb 9.6 oz)   02/20/23 96.3 kg (212 lb 3.2 oz)     BP Readings from Last 3 Encounters:   06/19/23 116/68   03/01/23 110/76   02/20/23 98/62     Body mass index is 34.3 kg/m².     Physical Exam  Vitals and nursing note reviewed.   Constitutional:       General: He is not in acute distress.     Appearance: Normal appearance.   HENT:      Head: Normocephalic.      Right Ear: Tympanic membrane, ear canal and external ear normal.      Left Ear: Tympanic membrane, ear canal and external ear normal.      Nose: Nose normal.      Mouth/Throat:      Mouth: Mucous membranes are moist.      Pharynx: Oropharynx is clear.   Eyes:      Conjunctiva/sclera: Conjunctivae normal.   Neck:      Thyroid: No thyromegaly.      Vascular: Normal carotid pulses. No carotid bruit.   Cardiovascular:      Rate and Rhythm: Normal rate and regular rhythm.      Pulses: Normal pulses.      Heart sounds: Normal heart sounds.   Pulmonary:      Effort: Pulmonary effort is normal. No respiratory distress.      Breath sounds: Normal breath sounds.   Abdominal:      Palpations: Abdomen is soft.      Tenderness: There is no abdominal tenderness.   Musculoskeletal:      Cervical back: Neck supple.      Right lower leg: No edema.      Left lower leg: No edema.   Lymphadenopathy:      Cervical: No cervical adenopathy.   Skin:     " General: Skin is warm and dry.      Capillary Refill: Capillary refill takes less than 2 seconds.   Neurological:      General: No focal deficit present.      Mental Status: He is alert and oriented to person, place, and time.   Psychiatric:         Mood and Affect: Mood normal.         Behavior: Behavior normal.        Assessment and Plan: 274}     1. Routine general medical examination at a health care facility    2. Screening for hyperlipidemia  -     Lipid Panel; Future; Expected date: 06/19/2023    3. Diabetes mellitus screening    4. Prostate cancer screening  -     PSA, Screening; Future; Expected date: 06/19/2023    5. Type 2 diabetes mellitus without complication, without long-term current use of insulin  Comments:  well controlled, continue metformin and Ozempic  Schedule yearly eye exam at [facility] prior to leaving today.  Orders:  -     Comprehensive Metabolic Panel; Future; Expected date: 06/19/2023  -     Hemoglobin A1C; Future; Expected date: 06/19/2023  -     NIFEdipine (ADALAT CC) 60 MG TbSR; Take 1 tablet (60 mg total) by mouth once daily.  Dispense: 90 tablet; Refill: 3  -     metFORMIN (GLUCOPHAGE) 500 MG tablet; Take 1 tablet (500 mg total) by mouth daily with breakfast.  Dispense: 90 tablet; Refill: 3  -     Ambulatory referral/consult to Optometry; Future; Expected date: 06/26/2023    6. Primary hypertension  Comments:  controlled  Remove hctz from lisinopril hctz d/t BP well controlled & is on dyazide.  Orders:  -     triamterene-hydrochlorothiazide 37.5-25 mg (DYAZIDE) 37.5-25 mg per capsule; Take 1 capsule by mouth once daily.  Dispense: 90 capsule; Refill: 3  -     cloNIDine (CATAPRES) 0.2 MG tablet; Take 1 tablet (0.2 mg total) by mouth every evening.  Dispense: 90 tablet; Refill: 3  -     lisinopriL (PRINIVIL,ZESTRIL) 20 MG tablet; Take 1 tablet (20 mg total) by mouth once daily.  Dispense: 90 tablet; Refill: 3    7. Hypercholesteremia  -     Comprehensive Metabolic Panel; Future; Expected  date: 06/19/2023  -     pravastatin (PRAVACHOL) 20 MG tablet; Take 1 tablet (20 mg total) by mouth once daily.  Dispense: 90 tablet; Refill: 3    8. Obesity, Class I, BMI 30-34.9    9. Need for pneumococcal vaccine  -     Pneumococcal Conjugate Vaccine (20 Valent) (IM)    10. Screening for malignant neoplasm of colon  Comments:  Re-entered referral for screening colonosocpy.  Orders:  -     Ambulatory referral/consult to Gastroenterology; Future; Expected date: 06/26/2023    11. Hypokalemia  -     potassium chloride SA (K-DUR,KLOR-CON) 20 MEQ tablet; Take 1 tablet (20 mEq total) by mouth once daily.  Dispense: 90 tablet; Refill: 3    12. Dysuria  -     Urinalysis, Reflex to Urine Culture; Future; Expected date: 06/19/2023    13. Pain of right heel  -     X-Ray Calcaneus 2 View Right; Future; Expected date: 06/19/2023    14. Chronic idiopathic constipation  Comments:  Linzess 290 mcg stopped working; dulcolax helps sometimes  Try MiraLax 2x/day, increase fiber and water intake.  Orders:  -     polyethylene glycol (GLYCOLAX) 17 gram/dose powder; Take 17 g by mouth 2 (two) times daily.  Dispense: 1020 g; Refill: 5        Return to clinic 6 mths for DM & HTN; 1 yr wellness, fasting; and sooner as needed.    Future Appointments   Date Time Provider Department Center   10/19/2023 12:00 PM [facility] GI ROOM [address] [place] [address]   1/2/2024  8:00 AM MICAELA Lowry [facility] Torres   6/20/2024  9:00 AM MICAELA Lowry [facility]        Signature:  MICAELA Lowry

## 2023-07-19 LAB
LEFT EYE DM RETINOPATHY: NEGATIVE
RIGHT EYE DM RETINOPATHY: NEGATIVE

## 2023-08-01 ENCOUNTER — OFFICE VISIT (OUTPATIENT)
Dept: FAMILY MEDICINE | Facility: CLINIC | Age: 48
End: 2023-08-01
Payer: COMMERCIAL

## 2023-08-01 VITALS
HEIGHT: 67 IN | WEIGHT: 226.63 LBS | HEART RATE: 77 BPM | DIASTOLIC BLOOD PRESSURE: 62 MMHG | OXYGEN SATURATION: 96 % | TEMPERATURE: 98 F | SYSTOLIC BLOOD PRESSURE: 118 MMHG | BODY MASS INDEX: 35.57 KG/M2

## 2023-08-01 DIAGNOSIS — Z20.828 EXPOSURE TO VIRAL DISEASE: Primary | ICD-10-CM

## 2023-08-01 PROCEDURE — 99212 PR OFFICE/OUTPT VISIT, EST, LEVL II, 10-19 MIN: ICD-10-PCS | Mod: ,,, | Performed by: NURSE PRACTITIONER

## 2023-08-01 PROCEDURE — 3044F HG A1C LEVEL LT 7.0%: CPT | Mod: CPTII,,, | Performed by: NURSE PRACTITIONER

## 2023-08-01 PROCEDURE — 3078F DIAST BP <80 MM HG: CPT | Mod: CPTII,,, | Performed by: NURSE PRACTITIONER

## 2023-08-01 PROCEDURE — 87428 SARSCOV & INF VIR A&B AG IA: CPT | Mod: QW,,, | Performed by: NURSE PRACTITIONER

## 2023-08-01 PROCEDURE — 87428 POCT SARS-COV2 (COVID) WITH FLU ANTIGEN: ICD-10-PCS | Mod: QW,,, | Performed by: NURSE PRACTITIONER

## 2023-08-01 PROCEDURE — 3074F SYST BP LT 130 MM HG: CPT | Mod: CPTII,,, | Performed by: NURSE PRACTITIONER

## 2023-08-01 PROCEDURE — 3008F BODY MASS INDEX DOCD: CPT | Mod: CPTII,,, | Performed by: NURSE PRACTITIONER

## 2023-08-01 PROCEDURE — 99212 OFFICE O/P EST SF 10 MIN: CPT | Mod: ,,, | Performed by: NURSE PRACTITIONER

## 2023-08-01 PROCEDURE — 3008F PR BODY MASS INDEX (BMI) DOCUMENTED: ICD-10-PCS | Mod: CPTII,,, | Performed by: NURSE PRACTITIONER

## 2023-08-01 PROCEDURE — 3074F PR MOST RECENT SYSTOLIC BLOOD PRESSURE < 130 MM HG: ICD-10-PCS | Mod: CPTII,,, | Performed by: NURSE PRACTITIONER

## 2023-08-01 PROCEDURE — 3078F PR MOST RECENT DIASTOLIC BLOOD PRESSURE < 80 MM HG: ICD-10-PCS | Mod: CPTII,,, | Performed by: NURSE PRACTITIONER

## 2023-08-01 PROCEDURE — 4010F PR ACE/ARB THEARPY RXD/TAKEN: ICD-10-PCS | Mod: CPTII,,, | Performed by: NURSE PRACTITIONER

## 2023-08-01 PROCEDURE — 3044F PR MOST RECENT HEMOGLOBIN A1C LEVEL <7.0%: ICD-10-PCS | Mod: CPTII,,, | Performed by: NURSE PRACTITIONER

## 2023-08-01 PROCEDURE — 4010F ACE/ARB THERAPY RXD/TAKEN: CPT | Mod: CPTII,,, | Performed by: NURSE PRACTITIONER

## 2023-08-01 NOTE — PROGRESS NOTES
Subjective:       Patient ID: Russel Hill is a 47 y.o. male.    Chief Complaint: Fever (Checked temp yesterday and it was 100.6. Checked again this morning and it was going down but still not feeling well. )    Fever yesterday and fatigue today    Fever   Pertinent negatives include no abdominal pain, chest pain, congestion, coughing, ear pain, headaches, nausea, rash, sore throat or vomiting.     Review of Systems   Constitutional:  Positive for fatigue and fever. Negative for appetite change.   HENT:  Negative for nasal congestion, ear pain and sore throat.    Eyes:  Negative for pain, discharge and itching.   Respiratory:  Negative for cough and shortness of breath.    Cardiovascular:  Negative for chest pain and leg swelling.   Gastrointestinal:  Negative for abdominal pain, change in bowel habit, nausea, vomiting and change in bowel habit.   Musculoskeletal:  Negative for back pain, gait problem, myalgias and neck pain.   Integumentary:  Negative for rash and wound.   Neurological:  Negative for dizziness, weakness and headaches.   All other systems reviewed and are negative.        Objective:      Physical Exam  Vitals and nursing note reviewed.   Constitutional:       General: He is not in acute distress.     Appearance: Normal appearance. He is not ill-appearing, toxic-appearing or diaphoretic.   HENT:      Head: Normocephalic.      Right Ear: Ear canal and external ear normal.      Left Ear: Ear canal and external ear normal.      Ears:      Comments: Dull TMs     Nose: Congestion present. No rhinorrhea.      Mouth/Throat:      Mouth: Mucous membranes are moist.      Pharynx: No oropharyngeal exudate or posterior oropharyngeal erythema.   Eyes:      General: No scleral icterus.        Right eye: No discharge.         Left eye: No discharge.      Extraocular Movements: Extraocular movements intact.      Conjunctiva/sclera: Conjunctivae normal.      Pupils: Pupils are equal, round, and reactive to  light.   Cardiovascular:      Rate and Rhythm: Normal rate and regular rhythm.      Pulses: Normal pulses.      Heart sounds: Normal heart sounds. No murmur heard.  Pulmonary:      Effort: Pulmonary effort is normal. No respiratory distress.      Breath sounds: Normal breath sounds. No wheezing, rhonchi or rales.   Musculoskeletal:         General: Normal range of motion.      Cervical back: Neck supple. No tenderness.   Lymphadenopathy:      Cervical: No cervical adenopathy.   Skin:     General: Skin is warm and dry.      Capillary Refill: Capillary refill takes less than 2 seconds.      Findings: No rash.   Neurological:      Mental Status: He is alert and oriented to person, place, and time.   Psychiatric:         Mood and Affect: Mood normal.         Behavior: Behavior normal.         Thought Content: Thought content normal.         Judgment: Judgment normal.          Assessment:       1. Exposure to viral disease        Plan:   Exposure to viral disease  -     POCT SARS-COV2 (COVID) with Flu Antigen         Risks, benefits, and side effects were discussed with the patient. All questions were answered to the fullest satisfaction of the patient, and pt verbalized understanding and agreement to treatment plan. Pt was to call with any new or worsening symptoms, or present to the ER

## 2023-08-01 NOTE — LETTER
August 1, 2023      [facility] - [facility] Medicine  [address] [place] [address]  Phone: [phone]  Fax: [phone]       Patient: Russel Hill   YOB: 1975  Date of Visit: 08/01/2023    To Whom It May Concern:    Asher Hill  was at [facility] on 08/01/2023. The patient may return to work/school on 08/02/2023 with no restrictions. If you have any questions or concerns, or if I can be of further assistance, please do not hesitate to contact me.    Sincerely,    MICAELA Figueroa

## 2023-08-02 ENCOUNTER — PATIENT OUTREACH (OUTPATIENT)
Dept: ADMINISTRATIVE | Facility: HOSPITAL | Age: 48
End: 2023-08-02

## 2023-08-02 NOTE — PROGRESS NOTES
HM updated with eye exam on 7/19/2023 by Dr. Vidal. Results sent to PCP for review.     Reset due date on postponed HM.

## 2023-10-19 ENCOUNTER — ANESTHESIA EVENT (OUTPATIENT)
Dept: GASTROENTEROLOGY | Facility: HOSPITAL | Age: 48
End: 2023-10-19
Payer: COMMERCIAL

## 2023-10-19 ENCOUNTER — ANESTHESIA (OUTPATIENT)
Dept: GASTROENTEROLOGY | Facility: HOSPITAL | Age: 48
End: 2023-10-19
Payer: COMMERCIAL

## 2023-10-19 ENCOUNTER — HOSPITAL ENCOUNTER (OUTPATIENT)
Dept: GASTROENTEROLOGY | Facility: HOSPITAL | Age: 48
Discharge: HOME | End: 2023-10-19
Attending: INTERNAL MEDICINE
Payer: COMMERCIAL

## 2023-10-19 VITALS
BODY MASS INDEX: 35.47 KG/M2 | OXYGEN SATURATION: 90 % | DIASTOLIC BLOOD PRESSURE: 48 MMHG | RESPIRATION RATE: 19 BRPM | SYSTOLIC BLOOD PRESSURE: 92 MMHG | WEIGHT: 226 LBS | HEIGHT: 67 IN | HEART RATE: 76 BPM | TEMPERATURE: 98 F

## 2023-10-19 DIAGNOSIS — Z12.11 SCREENING FOR COLON CANCER: ICD-10-CM

## 2023-10-19 DIAGNOSIS — C18.9 MALIGNANT NEOPLASM OF COLON, UNSPECIFIED PART OF COLON: Primary | ICD-10-CM

## 2023-10-19 LAB
CEA SERPL-MCNC: 2 NG/ML (ref 0–3)
GLUCOSE SERPL-MCNC: 90 MG/DL (ref 70–105)

## 2023-10-19 PROCEDURE — 88305 TISSUE EXAM BY PATHOLOGIST: CPT | Mod: TC,SUR | Performed by: INTERNAL MEDICINE

## 2023-10-19 PROCEDURE — 45381 COLONOSCOPY SUBMUCOUS NJX: CPT | Mod: PT,51,, | Performed by: INTERNAL MEDICINE

## 2023-10-19 PROCEDURE — 82378 CARCINOEMBRYONIC ANTIGEN: CPT | Performed by: INTERNAL MEDICINE

## 2023-10-19 PROCEDURE — D9220A PRA ANESTHESIA: Mod: 33,,, | Performed by: NURSE ANESTHETIST, CERTIFIED REGISTERED

## 2023-10-19 PROCEDURE — 27201423 OPTIME MED/SURG SUP & DEVICES STERILE SUPPLY

## 2023-10-19 PROCEDURE — 25000003 PHARM REV CODE 250: Performed by: NURSE ANESTHETIST, CERTIFIED REGISTERED

## 2023-10-19 PROCEDURE — 45380 COLONOSCOPY AND BIOPSY: CPT | Mod: PT,,, | Performed by: INTERNAL MEDICINE

## 2023-10-19 PROCEDURE — 27000284 HC CANNULA NASAL: Performed by: NURSE ANESTHETIST, CERTIFIED REGISTERED

## 2023-10-19 PROCEDURE — 88341 SURGICAL PATHOLOGY: ICD-10-PCS | Mod: 26,,, | Performed by: PATHOLOGY

## 2023-10-19 PROCEDURE — 45380 PR COLONOSCOPY,BIOPSY: ICD-10-PCS | Mod: PT,,, | Performed by: INTERNAL MEDICINE

## 2023-10-19 PROCEDURE — 88305 TISSUE EXAM BY PATHOLOGIST: CPT | Mod: 26,,, | Performed by: PATHOLOGY

## 2023-10-19 PROCEDURE — 88342 SURGICAL PATHOLOGY: ICD-10-PCS | Mod: 26,,, | Performed by: PATHOLOGY

## 2023-10-19 PROCEDURE — 82962 GLUCOSE BLOOD TEST: CPT

## 2023-10-19 PROCEDURE — 37000009 HC ANESTHESIA EA ADD 15 MINS

## 2023-10-19 PROCEDURE — 45380 COLONOSCOPY AND BIOPSY: CPT | Mod: PT | Performed by: INTERNAL MEDICINE

## 2023-10-19 PROCEDURE — 37000008 HC ANESTHESIA 1ST 15 MINUTES

## 2023-10-19 PROCEDURE — 88342 IMHCHEM/IMCYTCHM 1ST ANTB: CPT | Mod: 26,,, | Performed by: PATHOLOGY

## 2023-10-19 PROCEDURE — 27000716 HC OXISENSOR PROBE, ANY SIZE: Performed by: NURSE ANESTHETIST, CERTIFIED REGISTERED

## 2023-10-19 PROCEDURE — 45381 PR COLONOSCPY,FLEX,W/DIR SUBMUC INJECT: ICD-10-PCS | Mod: PT,51,, | Performed by: INTERNAL MEDICINE

## 2023-10-19 PROCEDURE — D9220A PRA ANESTHESIA: ICD-10-PCS | Mod: 33,,, | Performed by: NURSE ANESTHETIST, CERTIFIED REGISTERED

## 2023-10-19 PROCEDURE — 88305 SURGICAL PATHOLOGY: ICD-10-PCS | Mod: 26,,, | Performed by: PATHOLOGY

## 2023-10-19 PROCEDURE — 63600175 PHARM REV CODE 636 W HCPCS: Performed by: NURSE ANESTHETIST, CERTIFIED REGISTERED

## 2023-10-19 PROCEDURE — 45381 COLONOSCOPY SUBMUCOUS NJX: CPT | Mod: PT | Performed by: INTERNAL MEDICINE

## 2023-10-19 PROCEDURE — 88341 IMHCHEM/IMCYTCHM EA ADD ANTB: CPT | Mod: 26,,, | Performed by: PATHOLOGY

## 2023-10-19 RX ORDER — PROPOFOL 10 MG/ML
VIAL (ML) INTRAVENOUS
Status: DISCONTINUED | OUTPATIENT
Start: 2023-10-19 | End: 2023-10-19

## 2023-10-19 RX ORDER — SODIUM CHLORIDE 0.9 % (FLUSH) 0.9 %
10 SYRINGE (ML) INJECTION
Status: DISCONTINUED | OUTPATIENT
Start: 2023-10-19 | End: 2023-10-20 | Stop reason: HOSPADM

## 2023-10-19 RX ORDER — LIDOCAINE HYDROCHLORIDE 20 MG/ML
INJECTION, SOLUTION EPIDURAL; INFILTRATION; INTRACAUDAL; PERINEURAL
Status: DISCONTINUED | OUTPATIENT
Start: 2023-10-19 | End: 2023-10-19

## 2023-10-19 RX ADMIN — PROPOFOL 100 MG: 10 INJECTION, EMULSION INTRAVENOUS at 12:10

## 2023-10-19 RX ADMIN — PROPOFOL 100 MG: 10 INJECTION, EMULSION INTRAVENOUS at 01:10

## 2023-10-19 RX ADMIN — LIDOCAINE HYDROCHLORIDE 100 MG: 20 INJECTION, SOLUTION INTRAVENOUS at 12:10

## 2023-10-19 RX ADMIN — SODIUM CHLORIDE: 9 INJECTION, SOLUTION INTRAVENOUS at 12:10

## 2023-10-19 RX ADMIN — PROPOFOL 25 MG: 10 INJECTION, EMULSION INTRAVENOUS at 01:10

## 2023-10-19 NOTE — ANESTHESIA POSTPROCEDURE EVALUATION
Anesthesia Post Evaluation    Patient: Russel Hill    Procedure(s) Performed: Colonoscopy    Final Anesthesia Type: general      Patient location during evaluation: GI PACU  Patient participation: Yes- Able to Participate  Level of consciousness: awake and alert  Post-procedure vital signs: reviewed and stable  Pain management: adequate  Airway patency: patent    PONV status at discharge: No PONV  Anesthetic complications: no      Cardiovascular status: blood pressure returned to baseline and hemodynamically stable  Respiratory status: spontaneous ventilation  Hydration status: euvolemic  Follow-up not needed.  Comments: Refer to nursing notes for pain/abilio score upon discharge from recovery.          Vitals Value Taken Time   /67 10/19/23 1351   Temp 36.6 °C (97.8 °F) 10/19/23 1314   Pulse 82 10/19/23 1351   Resp 15 10/19/23 1351   SpO2 100 % 10/19/23 1351   Vitals shown include unvalidated device data.      No case tracking events are documented in the log.      Pain/Abilio Score: Abilio Score: 8 (10/19/2023  1:22 PM)

## 2023-10-19 NOTE — H&P
[facility] - Endoscopy  Gastroenterology  H&P    Patient Name: Russel Hill  MRN: [id number]  Admission Date: 10/19/2023  Code Status: No Order    Attending Provider: Aldo Ruiz MD   Primary Care Physician: Charlotte Martinez FNP  Principal Problem:<principal problem not specified>    Subjective:     History of Present Illness: Pt presents for first screening colonoscopy.    Past Medical History:   Diagnosis Date    Constipation     Diabetes mellitus, type 2 02/12/2014    Erectile dysfunction     Hypercholesteremia 03/04/2016    Hypertension     Severe obesity (BMI >= 40) 10/01/2012       Past Surgical History:   Procedure Laterality Date    CIRCUMCISION  1996       Review of patient's allergies indicates:   Allergen Reactions    Marijuana/cannabinoid     Melon      watermellon     Family History       Problem Relation (Age of Onset)    Cancer Mother, Maternal Grandmother, Maternal Grandfather, Paternal Grandmother, Paternal Grandfather    Diabetes Mother, Father, Brother, Maternal Grandmother, Maternal Grandfather, Paternal Grandmother, Paternal Grandfather    Heart disease Brother    Heart failure Brother, Brother    Hypertension Mother, Father, Sister, Brother, Brother, Maternal Grandmother, Maternal Grandfather, Paternal Grandmother, Paternal Grandfather    Kidney failure Brother    No Known Problems Daughter, Daughter, Daughter, Daughter, Son, Son          Tobacco Use    Smoking status: Never    Smokeless tobacco: Never   Substance and Sexual Activity    Alcohol use: Yes     Comment: drinks some liquor one day each weekend    Drug use: Not Currently     Comment: marijuana caused a seizure    Sexual activity: Yes     Partners: Female     Birth control/protection: None     Review of Systems   Respiratory: Negative.     Cardiovascular: Negative.    Gastrointestinal: Negative.      Objective:     Vital Signs (Most Recent):  Pulse: 71 (10/19/23 1202)  Resp: 12 (10/19/23 1202)  BP: 124/80 (10/19/23  "1202)  SpO2: 97 % (10/19/23 1202) Vital Signs (24h Range):  Pulse:  [71] 71  Resp:  [12] 12  SpO2:  [97 %] 97 %  BP: (124)/(80) 124/80     Weight: 102.5 kg (226 lb) (10/19/23 1202)  Body mass index is 35.4 kg/m².    No intake or output data in the 24 hours ending 10/19/23 1243    Lines/Drains/Airways       Peripheral Intravenous Line  Duration                  Peripheral IV - Single Lumen 10/19/23 1201 22 G Posterior;Right Hand <1 day                    Physical Exam  Vitals reviewed.   Constitutional:       General: He is not in acute distress.     Appearance: Normal appearance. He is well-developed. He is obese. He is not ill-appearing.   HENT:      Head: Normocephalic and atraumatic.      Nose: Nose normal.   Eyes:      Pupils: Pupils are equal, round, and reactive to light.   Cardiovascular:      Rate and Rhythm: Normal rate and regular rhythm.   Pulmonary:      Effort: Pulmonary effort is normal.      Breath sounds: Normal breath sounds. No wheezing.   Abdominal:      General: Abdomen is flat. Bowel sounds are normal. There is no distension.      Palpations: Abdomen is soft.      Tenderness: There is no abdominal tenderness. There is no guarding.   Skin:     General: Skin is warm and dry.      Coloration: Skin is not jaundiced.   Neurological:      Mental Status: He is alert.   Psychiatric:         Attention and Perception: Attention normal.         Mood and Affect: Affect normal.         Speech: Speech normal.         Behavior: Behavior is cooperative.      Comments: Pt was calm while speaking.         Significant Labs:  CBC: No results for input(s): "WBC", "HGB", "HCT", "PLT" in the last 48 hours.  CMP: No results for input(s): "GLU", "CALCIUM", "ALBUMIN", "PROT", "NA", "K", "CO2", "CL", "BUN", "CREATININE", "ALKPHOS", "ALT", "AST", "BILITOT" in the last 48 hours.    Significant Imaging:  Imaging results within the past 24 hours have been reviewed.    Assessment/Plan:     There are no hospital problems to " display for this patient.        Imp: screening for colon neoplasm  Plan: colonoscopy    Aldo Ruiz MD  Gastroenterology  [facility] - Endoscopy

## 2023-10-19 NOTE — TRANSFER OF CARE
"Anesthesia Transfer of Care Note    Patient: Russel Hill    Procedure(s) Performed: * No procedures listed *    Patient location: GI    Anesthesia Type: general    Transport from OR: Transported from OR on room air with adequate spontaneous ventilation. Continuous ECG monitoring in transport. Continuous SpO2 monitoring in transport    Post pain: adequate analgesia    Post assessment: no apparent anesthetic complications    Post vital signs: stable    Level of consciousness: sedated and responds to stimulation    Nausea/Vomiting: no nausea/vomiting    Complications: none    Transfer of care protocol was followedComments: Good SV continue, NAD, VSS, RTRN      Last vitals:   Visit Vitals  BP (!) 92/50 (BP Location: Left arm, Patient Position: Lying)   Pulse 75   Temp 36.6 °C (97.8 °F) (Oral)   Resp 16   Ht 5' 7" (1.702 m)   Wt 102.5 kg (226 lb)   SpO2 99%   BMI 35.40 kg/m²     "

## 2023-10-19 NOTE — DISCHARGE INSTRUCTIONS
Procedure Date  10/19/23     Impression  Overall Impression:   Polyp in the ascending colon was removed with cold forceps biopsy; tattooed  An ulcerated sessile neoplasm was noted and biopsied in the proximal ascending colon. After biopsies were obtained, the site was marked with ink tattoo injection. No other polyps were seen.     Recommendation    Await pathology results     Repeat colonoscopy in 1 year      CEA level in GI recovery; appt with surgeon of choice, schedule GI clinic appt with DERRICK Tena in 1 month.  Discharge: disp: DC to home. Resume diet. No driving x 24 hours; f/u with PCP, surgery clinic and GI clinic appts.  Dx: ascending colon neoplasm was noted, biopsied and tattooed during screening colonoscopy.     Indication  Screening for colon cancer      98.6

## 2023-10-19 NOTE — ANESTHESIA PREPROCEDURE EVALUATION
10/19/2023  Russel Hill is a 48 y.o., male.      Pre-op Assessment    I have reviewed the Patient Summary Reports.     I have reviewed the Nursing Notes. I have reviewed the NPO Status.   I have reviewed the Medications.     Review of Systems  Anesthesia Hx:  No problems with previous Anesthesia    Social:  Alcohol Use, Non-Smoker    Cardiovascular:   Hypertension, poorly controlled hyperlipidemia    Endocrine:   Diabetes, well controlled, type 2  Obesity / BMI > 30      Physical Exam  General: Well nourished, Cooperative, Alert and Oriented    Airway:  Mallampati: II   Mouth Opening: Normal  TM Distance: Normal  Tongue: Normal  Neck ROM: Normal ROM    Dental:  Intact        Anesthesia Plan  Type of Anesthesia, risks & benefits discussed:    Anesthesia Type: Gen Natural Airway, MAC  Intra-op Monitoring Plan: Standard ASA Monitors  Post Op Pain Control Plan: multimodal analgesia and IV/PO Opioids PRN  Induction:  IV  Informed Consent: Informed consent signed with the Patient and all parties understand the risks and agree with anesthesia plan.  All questions answered. Patient consented to blood products? Yes  ASA Score: 3  Day of Surgery Review of History & Physical: I have interviewed and examined the patient. I have reviewed the patient's H&P dated: There are no significant changes.     Ready For Surgery From Anesthesia Perspective.     .   Past Medical History:   Diagnosis Date    Constipation     Diabetes mellitus, type 2 02/12/2014    Erectile dysfunction     Hypercholesteremia 03/04/2016    Hypertension     Severe obesity (BMI >= 40) 10/01/2012       Past Surgical History:   Procedure Laterality Date    CIRCUMCISION  1996       Family History   Problem Relation Age of Onset    Cancer Mother     Diabetes Mother     Hypertension Mother     Hypertension Father     Diabetes Father      Hypertension Sister     Heart failure Brother     Hypertension Brother     Hypertension Brother     Heart failure Brother     Diabetes Brother     Kidney failure Brother     Heart disease Brother     Cancer Maternal Grandmother     Diabetes Maternal Grandmother     Hypertension Maternal Grandmother     Cancer Maternal Grandfather     Diabetes Maternal Grandfather     Hypertension Maternal Grandfather     Cancer Paternal Grandmother     Diabetes Paternal Grandmother     Hypertension Paternal Grandmother     Cancer Paternal Grandfather     Diabetes Paternal Grandfather     Hypertension Paternal Grandfather     No Known Problems Daughter     No Known Problems Daughter     No Known Problems Daughter     No Known Problems Daughter     No Known Problems Son     No Known Problems Son        Social History     Socioeconomic History    Marital status: Single   Tobacco Use    Smoking status: Never    Smokeless tobacco: Never   Substance and Sexual Activity    Alcohol use: Yes     Comment: drinks some liquor one day each weekend    Drug use: Not Currently     Comment: marijuana caused a seizure    Sexual activity: Yes     Partners: Female     Birth control/protection: None       Current Outpatient Medications   Medication Sig Dispense Refill    cloNIDine (CATAPRES) 0.2 MG tablet Take 1 tablet (0.2 mg total) by mouth every evening. 90 tablet 3    lisinopriL (PRINIVIL,ZESTRIL) 20 MG tablet Take 1 tablet (20 mg total) by mouth once daily. 90 tablet 3    metFORMIN (GLUCOPHAGE) 500 MG tablet Take 1 tablet (500 mg total) by mouth daily with breakfast. 90 tablet 3    NIFEdipine (ADALAT CC) 60 MG TbSR Take 1 tablet (60 mg total) by mouth once daily. 90 tablet 3    polyethylene glycol (GLYCOLAX) 17 gram/dose powder Take 17 g by mouth 2 (two) times daily. 1020 g 5    potassium chloride SA (K-DUR,KLOR-CON) 20 MEQ tablet Take 1 tablet (20 mEq total) by mouth once daily. 90 tablet 3    pravastatin  (PRAVACHOL) 20 MG tablet Take 1 tablet (20 mg total) by mouth once daily. 90 tablet 3    semaglutide (OZEMPIC) 1 mg/dose (4 mg/3 mL) Inject 1 mg into the skin every 7 days. 9 mL 3    triamterene-hydrochlorothiazide 37.5-25 mg (DYAZIDE) 37.5-25 mg per capsule Take 1 capsule by mouth once daily. 90 capsule 3     No current facility-administered medications for this encounter.       Review of patient's allergies indicates:   Allergen Reactions    Marijuana/cannabinoid     Melon      watermellon         Patient Active Problem List   Diagnosis    Type 2 diabetes mellitus without complication, without long-term current use of insulin    Primary hypertension    Hypercholesteremia    Erectile dysfunction    Chronic idiopathic constipation    Obesity, Class I, BMI 30-34.9    High risk heterosexual behavior    Seasonal allergic rhinitis due to pollen    Exposure to viral disease

## 2023-10-19 NOTE — LETTER
October 19, 2023      [facility]  [address] [place] [id number]  Phone: [phone]  Fax: [phone]       Patient: Russel Hill   YOB: 1975  Date of Visit: 10/19/2023    To Whom It May Concern:    Asher Hill  was at [facility] on 10/19/2023. The patient may return to work/school on 10/20/23.  If you have any questions or concerns, or if I can be of further assistance, please do not hesitate to contact me.    Sincerely,    Amarjit Dee RN

## 2023-10-20 LAB
DHEA SERPL-MCNC: NORMAL
ESTROGEN SERPL-MCNC: NORMAL PG/ML
INSULIN SERPL-ACNC: NORMAL U[IU]/ML
LAB AP GROSS DESCRIPTION: NORMAL
LAB AP LABORATORY NOTES: NORMAL
T3RU NFR SERPL: NORMAL %

## 2023-10-23 NOTE — PROGRESS NOTES
I notified pt of his biopsy results showing colon cancer. We need to make sure that surgery clinic got his referral and is scheduling an appointment.   Anticipate repeat colonoscopy in one year.

## 2023-10-31 ENCOUNTER — OFFICE VISIT (OUTPATIENT)
Dept: SURGERY | Facility: CLINIC | Age: 48
End: 2023-10-31
Attending: SURGERY
Payer: COMMERCIAL

## 2023-10-31 DIAGNOSIS — C18.9 MALIGNANT NEOPLASM OF COLON, UNSPECIFIED PART OF COLON: Primary | ICD-10-CM

## 2023-10-31 PROCEDURE — 99205 OFFICE O/P NEW HI 60 MIN: CPT | Mod: S$PBB,,, | Performed by: SURGERY

## 2023-10-31 PROCEDURE — 99214 OFFICE O/P EST MOD 30 MIN: CPT | Mod: PBBFAC | Performed by: SURGERY

## 2023-10-31 PROCEDURE — 3044F PR MOST RECENT HEMOGLOBIN A1C LEVEL <7.0%: ICD-10-PCS | Mod: CPTII,,, | Performed by: SURGERY

## 2023-10-31 PROCEDURE — 1159F MED LIST DOCD IN RCRD: CPT | Mod: CPTII,,, | Performed by: SURGERY

## 2023-10-31 PROCEDURE — 1159F PR MEDICATION LIST DOCUMENTED IN MEDICAL RECORD: ICD-10-PCS | Mod: CPTII,,, | Performed by: SURGERY

## 2023-10-31 PROCEDURE — 3044F HG A1C LEVEL LT 7.0%: CPT | Mod: CPTII,,, | Performed by: SURGERY

## 2023-10-31 PROCEDURE — 4010F PR ACE/ARB THEARPY RXD/TAKEN: ICD-10-PCS | Mod: CPTII,,, | Performed by: SURGERY

## 2023-10-31 PROCEDURE — 99205 PR OFFICE/OUTPT VISIT, NEW, LEVL V, 60-74 MIN: ICD-10-PCS | Mod: S$PBB,,, | Performed by: SURGERY

## 2023-10-31 PROCEDURE — 4010F ACE/ARB THERAPY RXD/TAKEN: CPT | Mod: CPTII,,, | Performed by: SURGERY

## 2023-10-31 RX ORDER — METRONIDAZOLE 500 MG/1
TABLET ORAL
Qty: 2 TABLET | Refills: 0 | Status: ON HOLD | OUTPATIENT
Start: 2023-10-31 | End: 2023-11-16 | Stop reason: ALTCHOICE

## 2023-10-31 RX ORDER — CIPROFLOXACIN 500 MG/1
TABLET ORAL
Qty: 2 TABLET | Refills: 0 | Status: ON HOLD | OUTPATIENT
Start: 2023-10-31 | End: 2023-11-16 | Stop reason: ALTCHOICE

## 2023-10-31 NOTE — ASSESSMENT & PLAN NOTE
Risks and benefits of surgery discussed to include infection, bleeding, hernia, anastomotic leak, and problems with the heart or lungs.  He expresses understanding wishes to proceed.  I did inform him that this likely had nothing to do with the his urinary symptoms, and he understands.

## 2023-10-31 NOTE — H&P (VIEW-ONLY)
Subjective:       Patient ID: Russel Hill is a 48 y.o. male.    Chief Complaint: Mass (Abnormal path on cscope)    48-year-old male patient who recently underwent colonoscopy due to symptoms of constipation and family history of colon cancer (mother).  He was determined to have a sessile polyp that was removed in fragments, but results returned Adenocarcinoma.  Colon has been marked with ink, as well.    Mass        family history includes Cancer in his maternal grandfather, maternal grandmother, mother, paternal grandfather, and paternal grandmother; Diabetes in his brother, father, maternal grandfather, maternal grandmother, mother, paternal grandfather, and paternal grandmother; Heart disease in his brother; Heart failure in his brother and brother; Hypertension in his brother, brother, father, maternal grandfather, maternal grandmother, mother, paternal grandfather, paternal grandmother, and sister; Kidney failure in his brother; No Known Problems in his daughter, daughter, daughter, daughter, son, and son.  Past Medical History:   Diagnosis Date    Constipation     Diabetes mellitus, type 2 02/12/2014    Erectile dysfunction     Hypercholesteremia 03/04/2016    Hypertension     Severe obesity (BMI >= 40) 10/01/2012      Past Surgical History:   Procedure Laterality Date    CIRCUMCISION  1996       reports that he has never smoked. He has never used smokeless tobacco. He reports current alcohol use. He reports that he does not currently use drugs.     Review of Systems   All other systems reviewed and are negative.         Objective:      There were no vitals taken for this visit.   Physical Exam  Constitutional:       General: He is not in acute distress.     Appearance: He is normal weight.   HENT:      Nose: Nose normal.   Eyes:      General: No scleral icterus.  Cardiovascular:      Rate and Rhythm: Normal rate and regular rhythm.      Pulses: Normal pulses.   Pulmonary:      Breath sounds: Normal  breath sounds.   Abdominal:      General: There is no distension.      Palpations: Abdomen is soft. There is no mass.      Hernia: No hernia is present.   Musculoskeletal:         General: No swelling or tenderness.   Lymphadenopathy:      Cervical: No cervical adenopathy.   Neurological:      General: No focal deficit present.      Mental Status: He is alert.      Motor: No weakness.   Psychiatric:         Mood and Affect: Mood normal.           Assessment/Plan:         Malignant neoplasm of colon, unspecified part of colon  -     Ambulatory referral/consult to General Surgery  -     CBC Auto Differential; Future; Expected date: 10/31/2023  -     EKG 12-lead; Future; Expected date: 11/05/2023  -     Comprehensive Metabolic Panel; Future; Expected date: 11/05/2023  -     Type & Screen; Future; Expected date: 10/31/2023         Problem List Items Addressed This Visit          Oncology    Malignant neoplasm of colon - Primary    Current Assessment & Plan     Risks and benefits of surgery discussed to include infection, bleeding, hernia, anastomotic leak, and problems with the heart or lungs.  He expresses understanding wishes to proceed.  I did inform him that this likely had nothing to do with the his urinary symptoms, and he understands.         Relevant Orders    CBC Auto Differential    EKG 12-lead    Comprehensive Metabolic Panel    Type & Screen

## 2023-10-31 NOTE — LETTER
October 31, 2023      [facility] - General Surgery  [address]  [place] [address]  Phone: [phone]  Fax: [phone]       Patient: Russel Hill   YOB: 1975  Date of Visit: 10/31/2023    To Whom It May Concern:    Asher Hill  was at [facility] on 10/31/2023. The patient may return to work on 10/31/23 with no restrictions. If you have any questions or concerns, or if I can be of further assistance, please do not hesitate to contact me.    Sincerely,      Boby Godwin M.D.

## 2023-10-31 NOTE — PATIENT INSTRUCTIONS
[facility]      Your surgery is scheduled for 11/16/23 at [facility] on the ground floor of the Ambulatory building. You should arrive at 0600 at the Ambulatory Care Center located at [address].                                                                                                                                                                                                                                                                                                                                                           Preoperative Instructions        Your pre-op lab work will be on 11/14/23 on the 1st floor of the [facility] building.  EKG is located on 2nd floor of [facility].                                                                                                                                             Day of Surgery Instructions      Bring a list of all your medications with you the day of your surgery. You can also give the list to your doctor or nurse during your final clinic appointment before surgery.      Do not eat any solid foods or drink any liquids after 12:00 AM (midnight). This includes gum, hard candy, mints, and chewing tobacco.  Medications: Take any medications specified with a small sip of water the morning of your surgery.  Brush your teeth: You may brush your teeth and rinse your mouth. Do not swallow any water or toothpaste.  Clothing: A button front shirt and loose-fitting clothes are the most comfortable before and after surgery. We also recommend low-heeled shoes.  Hair: Avoid buns, ponytails, or hairpieces at the back of the head. Remove or avoid any clips, pins or bands that bind hair. Do not use hairspray. Before going to surgery, you will need to remove any wigs or hairpieces.  We will cover your hair during surgery. Your privacy regarding personal appearance will be respected.  Fingernails: Please be sure  to remove all nail polish before you arrive for surgery. We understand that tips, wraps, gels, etc., are expensive; however, we ask these products to be removed from at least one finger on each hand. Your fingertips are used to accurately monitor your oxygen level during surgery by a device called an oximeter.  Glasses and Contact Lenses: Wear glasses when possible. If contact lenses must be worn, bring a lens case and solution. If glasses are worn, bring a case for them.  Hearing Aids: If you rely on a hearing aid, wear it to the hospital on the day of surgery. This will ensure you can hear and understand everything we need to communicate with you.  Valuables: Jewelry, including body piercings, Dentures, money, and credit cards should be left at home. [facility] is not responsible for valuables that are not secured in our surgery center.  Makeup, Perfume, Creams, Lotions and Deodorants: Do not use any of these products on the day of surgery. Remove false eyelashes prior to surgery.  Implanted Medical Devices: If you have an implanted device, such as a pacemaker or AICD, bring the device information card (if you have it) with you.  Medical Equipment: If you have been fitted for a brace to wear after surgery or you have been given crutches, bring those with you to the surgery center.  Shower: Take a shower with Hibiclens® (chlorhexidine) (available over the counter). This reduces the chance of infection. PLEASE USE CHLORHEXIDINE WASH THE NIGHT BEFORE SURGERY AND THE MORNING OF SURGERY.      Medication instructions:  You may take blood pressure medication with a small drink of water the morning of surgery.      IF YOU ARE ON ANY OF THESE BLOOD THINNERS, MAKE SURE YOUR PHYSICIAN IS AWARE.  Eliquis/Apixaban            Wafarin/Coumadin,Jantoven  Xarelto/Rivaroxaban      Pletal/Cilostazol  Plavix/Clopidogrel          Pradaxa/Dibigatran      If you are diabetic      Follow the diabetic medicine instructions you received  during your pre-operative visit.  DO NOT take your insulin or diabetic medications the morning of surgery.  When you arrive at the surgical center, be sure to tell the nurse you are diabetic.    The following blood sugar medications have to be stopped prior to surgery:    Hold 24 hours prior to surgery:    Libraglutide - Saxenda   Adlyxen - Lixixerutose  Byetta - Exenatide  Saxenda - Liralutide   Victoza    Hold 1 week prior to surgery:    Semaglutide - Ozempic, Wegouy, Rybelsus  Dulaglutide - Trulicity  Tiazepatide - Mounjaro  Bydurcon    Hold 48 hours prior to surgery:    Metformin, Glucovance, Metaglip, Fortamet, Glucophage, Riomet, Avandamet, Glimepiride            Other Items to bring with you and know      Insurance card  Identification card such as 's license, passport, or other picture ID  Copy of your advance directives  List of medications and allergies, if not already provided  Name and phone number of person to contact if your condition changes significantly. YOU CANNOT DRIVE YOURSELF HOME FROM THE HOSPITAL THE DAY OF SURGERY.  PLEASE UNDERSTAND THAT OUR OFFICE DOES NOT GIVE PATHOLOGY RESULTS OR TEST RESULTS OVER THE PHONE. THIS WILL BE DISCUSSED WITH YOU ON YOUR FOLLOW UP APPOINTMENT.          Alcohol and Surgery  We want to help you prepare for and recover from surgery as quickly and safely as possible. Be open and honest with your provider about how many drinks you have per day. Excessive alcohol use is defined as drinking more than three drinks per day. It can affect the outcome of your surgery. Binge drinking (consuming large amounts of alcohol infrequently, such as on weekends) can also affect the outcome of your surgery.  Alcohol withdrawal  If you drink more than three drinks a day, you could have a complication, called alcohol withdrawal, after surgery.  Alcohol withdrawal is a set of symptoms that people have when they suddenly stop drinking after using alcohol  for a long time. During  withdrawal, a person's central nervous system overreacts. This can cause mild symptoms such as shakiness, sweating or hallucinating. It can also cause other more serious side effects. If not treated properly, alcohol withdrawal can cause potentially life-threatening complications after surgery. This can include tremors, seizures, hallucinations, delirium tremors, and even death. Untreated alcohol withdrawal often leads to a longer stay in the hospital, potentially in the Intensive Care Unit.  Chronic heavy drinking also can interfere with several organ systems and biochemical processes in the body.  This interference can cause serious, even life-threatening complications.  Your care team can offer alcohol withdrawal treatment to help:  Decrease the risk of seizures and delirium tremors after surgery  Decrease the risk we will need to restrain you for your own safety or the safety of others  Decrease your risk of falling after surgery  Reduce the use of potent sedative medications  Reduce the time you stay in the hospital after surgery  Reduce the time you might spend on a mechanical ventilator to help you breathe  Lower incidence of organ failure and biochemical complications  Talk to a member of your care team or your primary care physician about your alcohol use if you feel you may be at risk of any of these complications.        Smoking and Surgery  Quitting smoking is extremely important for a successful surgery and recovery. Cigarette smoking compromises your immune system. This increases your risk of an infection after surgery. Quitting the habit before surgery will decrease the surgical risks associated with smoking.         [facility]  PREOPERATIVE BOWEL PREP INSTRUCTIONS      PREP DAY FOR SURGERY- 11/15/23  SURGERY DAY- 11/16/23      PLEASE HAVE YOUR PRESCRIPTIONS FILLED AND TAKE AS DIRECTED THE DAY PRIOR TO SURGERY    CLEAR LIQUIDS ONLY THE DAY BEFORE SURGERY    Only clear liquids are  allowed. Use of anything other than clear liquids will lessen the quality and safety of the colonoscopy. Clear liquids are any liquid that you can see through including black coffee, tea, soda, sports drinks (Gatorade, Powerade, etc.), Edmond-Aid, Popsicles, Jell-O, clear broths, and clear juices. NO RED ITEMS OF ANY KIND, NO ALCOHOL, NO MILK OR NON-DAIRY CREAMERS, NO JUICE WITH PULP, NO LIQUIDS YOU CAN NOT SEE THROUGH. Diabetics should use sugar free drinks during the prep and monitor your blood sugar closely to prevent low blood sugar.    LIST OF ITEMS YOU WILL NEED TO PURCHASE:  One - 238 gram container of MiraLax laxative  Four - Dulcolax laxative tablets, NOT stool softeners and NOT suppositories.  Two - 32 once/quart bottles of Gatorade or Powerade (any color except red)   (Diabetics may use Powerade Zero)      On Prep Day: (the day before your surgery):  9:00 AM -  Take 4 Dulcolax tablets with 8 ounces of water.   5:00 PM -  Mix 7 capfuls of MiraLax in 1 quart of Gatorade or Powerade. Stir until completely dissolved. Drink an 8 ounce glass of the solution every 15 minutes until you finish the mixture.  8:00 PM -  Mix the rest of the MiraLax in 1 quart of Gatorade or Powerade. Stir until completely dissolved. Drink an 8 ounce glass of the solution every 15 minutes until you finish the mixture.    9:00 PM -  Take Flagyl 500mg and Cipro 500mg by mouth.  11:00 PM - Take Flagyl 500mg and Cipro 500mg by mouth.    DO NOT EAT OR DRINK AFTER MIDNIGHT OR MORNING OF SURGERY.      Take your regular medication the day before you prep except for any medications you were instructed not to take.    Iron tablets and blood thinners such as Coumadin, Warfarin, Jantoven, or Pradaxa are to be stopped for 3 day before the procedure. Stop Plavix for a total of 5 days prior to the procedure.    If you are taking Effient, Xarelto, Eliquis, or Brilinta, consult your cardiologist before discontinuing this medication and before having  this procedure.    Blood pressure, cardiac (heart) and seizure medications, if normally taken in the morning, should be taken with a small sip of water in the morning of the procedure.    Failure to adhere to preparation instructions may result in cancellation and rescheduling of your procedure.      Helpful tips:  Some people may develop nausea with vomiting during the prep. The best remedy for this is to take a break from drinking the solution for about 30 minutes and resume drinking at a slower pace. It is important to drink the entire contents of the solution.  Walking between drinking each glass can help with bloating.  Use baby wipes or medicated wipes instead of toilet paper.  Apply some Vaseline or Desitin to the anal area between bowel movements.        Please contact your physician at [phone] for any questions or concerns.

## 2023-11-14 ENCOUNTER — CLINICAL SUPPORT (OUTPATIENT)
Dept: CARDIOLOGY | Facility: CLINIC | Age: 48
DRG: 331 | End: 2023-11-14
Attending: SURGERY
Payer: COMMERCIAL

## 2023-11-14 DIAGNOSIS — C18.9 MALIGNANT NEOPLASM OF COLON, UNSPECIFIED PART OF COLON: ICD-10-CM

## 2023-11-14 PROCEDURE — 93010 ELECTROCARDIOGRAM REPORT: CPT | Mod: S$PBB,,, | Performed by: INTERNAL MEDICINE

## 2023-11-14 PROCEDURE — 93010 EKG 12-LEAD: ICD-10-PCS | Mod: S$PBB,,, | Performed by: INTERNAL MEDICINE

## 2023-11-14 PROCEDURE — 99212 OFFICE O/P EST SF 10 MIN: CPT | Mod: PBBFAC

## 2023-11-14 PROCEDURE — 93005 ELECTROCARDIOGRAM TRACING: CPT | Mod: PBBFAC | Performed by: INTERNAL MEDICINE

## 2023-11-16 ENCOUNTER — HOSPITAL ENCOUNTER (INPATIENT)
Facility: HOSPITAL | Age: 48
LOS: 5 days | Discharge: HOME | DRG: 331 | End: 2023-11-21
Attending: SURGERY | Admitting: SURGERY
Payer: COMMERCIAL

## 2023-11-16 ENCOUNTER — ANESTHESIA EVENT (OUTPATIENT)
Dept: SURGERY | Facility: HOSPITAL | Age: 48
DRG: 331 | End: 2023-11-16
Payer: COMMERCIAL

## 2023-11-16 ENCOUNTER — ANESTHESIA (OUTPATIENT)
Dept: SURGERY | Facility: HOSPITAL | Age: 48
DRG: 331 | End: 2023-11-16
Payer: COMMERCIAL

## 2023-11-16 DIAGNOSIS — I10 PRIMARY HYPERTENSION: Chronic | ICD-10-CM

## 2023-11-16 DIAGNOSIS — C18.9 MALIGNANT TUMOR OF COLON: ICD-10-CM

## 2023-11-16 DIAGNOSIS — C18.9 MALIGNANT NEOPLASM OF COLON, UNSPECIFIED PART OF COLON: Primary | ICD-10-CM

## 2023-11-16 DIAGNOSIS — E11.9 TYPE 2 DIABETES MELLITUS WITHOUT COMPLICATION, WITHOUT LONG-TERM CURRENT USE OF INSULIN: Chronic | ICD-10-CM

## 2023-11-16 LAB
EST. AVERAGE GLUCOSE BLD GHB EST-MCNC: 123 MG/DL
GLUCOSE SERPL-MCNC: 108 MG/DL (ref 70–105)
GLUCOSE SERPL-MCNC: 166 MG/DL (ref 70–105)
GLUCOSE SERPL-MCNC: 90 MG/DL (ref 70–105)
HBA1C MFR BLD HPLC: 5.9 % (ref 4.5–6.6)

## 2023-11-16 PROCEDURE — 25000003 PHARM REV CODE 250: Performed by: SURGERY

## 2023-11-16 PROCEDURE — 83036 HEMOGLOBIN GLYCOSYLATED A1C: CPT | Performed by: SURGERY

## 2023-11-16 PROCEDURE — 82962 GLUCOSE BLOOD TEST: CPT

## 2023-11-16 PROCEDURE — 88309 TISSUE EXAM BY PATHOLOGIST: CPT | Mod: 26,,, | Performed by: PATHOLOGY

## 2023-11-16 PROCEDURE — 88309 SURGICAL PATHOLOGY: ICD-10-PCS | Mod: 26,,, | Performed by: PATHOLOGY

## 2023-11-16 PROCEDURE — D9220A PRA ANESTHESIA: Mod: CRNA,,, | Performed by: NURSE ANESTHETIST, CERTIFIED REGISTERED

## 2023-11-16 PROCEDURE — 99222 1ST HOSP IP/OBS MODERATE 55: CPT | Mod: ,,, | Performed by: HOSPITALIST

## 2023-11-16 PROCEDURE — 63600175 PHARM REV CODE 636 W HCPCS: Performed by: ANESTHESIOLOGY

## 2023-11-16 PROCEDURE — 36000709 HC OR TIME LEV III EA ADD 15 MIN: Performed by: SURGERY

## 2023-11-16 PROCEDURE — 44160 REMOVAL OF COLON: CPT | Mod: ,,, | Performed by: SURGERY

## 2023-11-16 PROCEDURE — 27000510 HC BLANKET BAIR HUGGER ANY SIZE: Performed by: NURSE ANESTHETIST, CERTIFIED REGISTERED

## 2023-11-16 PROCEDURE — 25000003 PHARM REV CODE 250: Performed by: NURSE ANESTHETIST, CERTIFIED REGISTERED

## 2023-11-16 PROCEDURE — 27000689 HC BLADE LARYNGOSCOPE ANY SIZE: Performed by: NURSE ANESTHETIST, CERTIFIED REGISTERED

## 2023-11-16 PROCEDURE — 88309 TISSUE EXAM BY PATHOLOGIST: CPT | Mod: TC,SUR | Performed by: SURGERY

## 2023-11-16 PROCEDURE — 63600175 PHARM REV CODE 636 W HCPCS: Performed by: HOSPITALIST

## 2023-11-16 PROCEDURE — 27000655: Performed by: NURSE ANESTHETIST, CERTIFIED REGISTERED

## 2023-11-16 PROCEDURE — D9220A PRA ANESTHESIA: ICD-10-PCS | Mod: ANES,,, | Performed by: ANESTHESIOLOGY

## 2023-11-16 PROCEDURE — 27000165 HC TUBE, ETT CUFFED: Performed by: NURSE ANESTHETIST, CERTIFIED REGISTERED

## 2023-11-16 PROCEDURE — D9220A PRA ANESTHESIA: Mod: ANES,,, | Performed by: ANESTHESIOLOGY

## 2023-11-16 PROCEDURE — 27000716 HC OXISENSOR PROBE, ANY SIZE: Performed by: NURSE ANESTHETIST, CERTIFIED REGISTERED

## 2023-11-16 PROCEDURE — 11000001 HC ACUTE MED/SURG PRIVATE ROOM

## 2023-11-16 PROCEDURE — 27201423 OPTIME MED/SURG SUP & DEVICES STERILE SUPPLY: Performed by: SURGERY

## 2023-11-16 PROCEDURE — 37000008 HC ANESTHESIA 1ST 15 MINUTES: Performed by: SURGERY

## 2023-11-16 PROCEDURE — 63600175 PHARM REV CODE 636 W HCPCS: Performed by: NURSE ANESTHETIST, CERTIFIED REGISTERED

## 2023-11-16 PROCEDURE — 37000009 HC ANESTHESIA EA ADD 15 MINS: Performed by: SURGERY

## 2023-11-16 PROCEDURE — 71000033 HC RECOVERY, INTIAL HOUR: Performed by: SURGERY

## 2023-11-16 PROCEDURE — 44160 PR REMVL COLON & TERM ILEUM W/ILEOCOLOSTOMY: ICD-10-PCS | Mod: ,,, | Performed by: SURGERY

## 2023-11-16 PROCEDURE — D9220A PRA ANESTHESIA: ICD-10-PCS | Mod: CRNA,,, | Performed by: NURSE ANESTHETIST, CERTIFIED REGISTERED

## 2023-11-16 PROCEDURE — 99222 PR INITIAL HOSPITAL CARE,LEVL II: ICD-10-PCS | Mod: ,,, | Performed by: HOSPITALIST

## 2023-11-16 PROCEDURE — 63600175 PHARM REV CODE 636 W HCPCS: Performed by: SURGERY

## 2023-11-16 PROCEDURE — 36000708 HC OR TIME LEV III 1ST 15 MIN: Performed by: SURGERY

## 2023-11-16 RX ORDER — BISACODYL 5 MG
10 TABLET, DELAYED RELEASE (ENTERIC COATED) ORAL DAILY PRN
Status: DISCONTINUED | OUTPATIENT
Start: 2023-11-16 | End: 2023-11-21 | Stop reason: HOSPADM

## 2023-11-16 RX ORDER — SIMETHICONE 80 MG
1 TABLET,CHEWABLE ORAL 3 TIMES DAILY PRN
Status: DISCONTINUED | OUTPATIENT
Start: 2023-11-16 | End: 2023-11-21 | Stop reason: HOSPADM

## 2023-11-16 RX ORDER — ENOXAPARIN SODIUM 100 MG/ML
40 INJECTION SUBCUTANEOUS EVERY 24 HOURS
Status: DISCONTINUED | OUTPATIENT
Start: 2023-11-17 | End: 2023-11-21 | Stop reason: HOSPADM

## 2023-11-16 RX ORDER — HYDRALAZINE HYDROCHLORIDE 20 MG/ML
10 INJECTION INTRAMUSCULAR; INTRAVENOUS EVERY 6 HOURS PRN
Status: DISCONTINUED | OUTPATIENT
Start: 2023-11-16 | End: 2023-11-21 | Stop reason: HOSPADM

## 2023-11-16 RX ORDER — FENTANYL CITRATE 50 UG/ML
INJECTION, SOLUTION INTRAMUSCULAR; INTRAVENOUS
Status: DISCONTINUED | OUTPATIENT
Start: 2023-11-16 | End: 2023-11-16

## 2023-11-16 RX ORDER — TRAZODONE HYDROCHLORIDE 50 MG/1
50 TABLET ORAL NIGHTLY PRN
Status: DISCONTINUED | OUTPATIENT
Start: 2023-11-16 | End: 2023-11-21 | Stop reason: HOSPADM

## 2023-11-16 RX ORDER — INSULIN ASPART 100 [IU]/ML
0-10 INJECTION, SOLUTION INTRAVENOUS; SUBCUTANEOUS EVERY 6 HOURS PRN
Status: DISCONTINUED | OUTPATIENT
Start: 2023-11-16 | End: 2023-11-21 | Stop reason: HOSPADM

## 2023-11-16 RX ORDER — ACETAMINOPHEN 325 MG/1
650 TABLET ORAL EVERY 4 HOURS PRN
Status: DISCONTINUED | OUTPATIENT
Start: 2023-11-16 | End: 2023-11-21 | Stop reason: HOSPADM

## 2023-11-16 RX ORDER — ONDANSETRON 2 MG/ML
4 INJECTION INTRAMUSCULAR; INTRAVENOUS EVERY 12 HOURS PRN
Status: DISCONTINUED | OUTPATIENT
Start: 2023-11-16 | End: 2023-11-16

## 2023-11-16 RX ORDER — OXYCODONE HYDROCHLORIDE 5 MG/1
5 TABLET ORAL
Status: DISCONTINUED | OUTPATIENT
Start: 2023-11-16 | End: 2023-11-16 | Stop reason: HOSPADM

## 2023-11-16 RX ORDER — ROCURONIUM BROMIDE 10 MG/ML
INJECTION, SOLUTION INTRAVENOUS
Status: DISCONTINUED | OUTPATIENT
Start: 2023-11-16 | End: 2023-11-16

## 2023-11-16 RX ORDER — METRONIDAZOLE 500 MG/100ML
INJECTION, SOLUTION INTRAVENOUS
Status: DISCONTINUED | OUTPATIENT
Start: 2023-11-16 | End: 2023-11-16

## 2023-11-16 RX ORDER — MORPHINE SULFATE 10 MG/ML
4 INJECTION INTRAMUSCULAR; INTRAVENOUS; SUBCUTANEOUS EVERY 4 HOURS PRN
Status: DISCONTINUED | OUTPATIENT
Start: 2023-11-16 | End: 2023-11-16

## 2023-11-16 RX ORDER — LISINOPRIL 20 MG/1
20 TABLET ORAL DAILY
Status: DISCONTINUED | OUTPATIENT
Start: 2023-11-17 | End: 2023-11-21 | Stop reason: HOSPADM

## 2023-11-16 RX ORDER — LIDOCAINE HYDROCHLORIDE 20 MG/ML
INJECTION, SOLUTION EPIDURAL; INFILTRATION; INTRACAUDAL; PERINEURAL
Status: DISCONTINUED | OUTPATIENT
Start: 2023-11-16 | End: 2023-11-16

## 2023-11-16 RX ORDER — PROPOFOL 10 MG/ML
VIAL (ML) INTRAVENOUS
Status: DISCONTINUED | OUTPATIENT
Start: 2023-11-16 | End: 2023-11-16

## 2023-11-16 RX ORDER — MORPHINE SULFATE 4 MG/ML
4 INJECTION, SOLUTION INTRAMUSCULAR; INTRAVENOUS EVERY 4 HOURS PRN
Status: DISCONTINUED | OUTPATIENT
Start: 2023-11-16 | End: 2023-11-21 | Stop reason: HOSPADM

## 2023-11-16 RX ORDER — DIPHENHYDRAMINE HYDROCHLORIDE 50 MG/ML
25 INJECTION INTRAMUSCULAR; INTRAVENOUS EVERY 6 HOURS PRN
Status: DISCONTINUED | OUTPATIENT
Start: 2023-11-16 | End: 2023-11-16 | Stop reason: HOSPADM

## 2023-11-16 RX ORDER — MEPERIDINE HYDROCHLORIDE 25 MG/ML
25 INJECTION INTRAMUSCULAR; INTRAVENOUS; SUBCUTANEOUS EVERY 10 MIN PRN
Status: DISCONTINUED | OUTPATIENT
Start: 2023-11-16 | End: 2023-11-16 | Stop reason: HOSPADM

## 2023-11-16 RX ORDER — MIDAZOLAM HYDROCHLORIDE 1 MG/ML
INJECTION INTRAMUSCULAR; INTRAVENOUS
Status: DISCONTINUED | OUTPATIENT
Start: 2023-11-16 | End: 2023-11-16

## 2023-11-16 RX ORDER — EPHEDRINE SULFATE 50 MG/ML
INJECTION, SOLUTION INTRAVENOUS
Status: DISCONTINUED | OUTPATIENT
Start: 2023-11-16 | End: 2023-11-16

## 2023-11-16 RX ORDER — ONDANSETRON 2 MG/ML
4 INJECTION INTRAMUSCULAR; INTRAVENOUS DAILY PRN
Status: DISCONTINUED | OUTPATIENT
Start: 2023-11-16 | End: 2023-11-16 | Stop reason: HOSPADM

## 2023-11-16 RX ORDER — TRIAMTERENE/HYDROCHLOROTHIAZID 37.5-25 MG
1 TABLET ORAL DAILY
Status: DISCONTINUED | OUTPATIENT
Start: 2023-11-17 | End: 2023-11-21 | Stop reason: HOSPADM

## 2023-11-16 RX ORDER — LIDOCAINE HYDROCHLORIDE 10 MG/ML
1 INJECTION INFILTRATION; PERINEURAL ONCE
Status: DISCONTINUED | OUTPATIENT
Start: 2023-11-16 | End: 2023-11-16 | Stop reason: HOSPADM

## 2023-11-16 RX ORDER — CLONIDINE HYDROCHLORIDE 0.2 MG/1
0.2 TABLET ORAL NIGHTLY
Status: DISCONTINUED | OUTPATIENT
Start: 2023-11-16 | End: 2023-11-21 | Stop reason: HOSPADM

## 2023-11-16 RX ORDER — GLUCAGON 1 MG
1 KIT INJECTION
Status: DISCONTINUED | OUTPATIENT
Start: 2023-11-16 | End: 2023-11-21 | Stop reason: HOSPADM

## 2023-11-16 RX ORDER — MORPHINE SULFATE 10 MG/ML
6 INJECTION INTRAMUSCULAR; INTRAVENOUS; SUBCUTANEOUS EVERY 4 HOURS PRN
Status: DISCONTINUED | OUTPATIENT
Start: 2023-11-16 | End: 2023-11-16

## 2023-11-16 RX ORDER — MORPHINE SULFATE 2 MG/ML
6 INJECTION, SOLUTION INTRAMUSCULAR; INTRAVENOUS EVERY 4 HOURS PRN
Status: DISCONTINUED | OUTPATIENT
Start: 2023-11-17 | End: 2023-11-21 | Stop reason: HOSPADM

## 2023-11-16 RX ORDER — CEFAZOLIN SODIUM 1 G/3ML
INJECTION, POWDER, FOR SOLUTION INTRAMUSCULAR; INTRAVENOUS
Status: DISCONTINUED | OUTPATIENT
Start: 2023-11-16 | End: 2023-11-16

## 2023-11-16 RX ORDER — NIFEDIPINE 60 MG/1
60 TABLET, EXTENDED RELEASE ORAL DAILY
Status: DISCONTINUED | OUTPATIENT
Start: 2023-11-17 | End: 2023-11-21 | Stop reason: HOSPADM

## 2023-11-16 RX ORDER — ACETAMINOPHEN 325 MG/1
650 TABLET ORAL EVERY 8 HOURS PRN
Status: DISCONTINUED | OUTPATIENT
Start: 2023-11-16 | End: 2023-11-21 | Stop reason: HOSPADM

## 2023-11-16 RX ORDER — ONDANSETRON 2 MG/ML
INJECTION INTRAMUSCULAR; INTRAVENOUS
Status: DISCONTINUED | OUTPATIENT
Start: 2023-11-16 | End: 2023-11-16

## 2023-11-16 RX ORDER — PRAVASTATIN SODIUM 10 MG/1
20 TABLET ORAL DAILY
Status: DISCONTINUED | OUTPATIENT
Start: 2023-11-17 | End: 2023-11-21 | Stop reason: HOSPADM

## 2023-11-16 RX ORDER — MORPHINE SULFATE 10 MG/ML
4 INJECTION INTRAMUSCULAR; INTRAVENOUS; SUBCUTANEOUS EVERY 5 MIN PRN
Status: DISCONTINUED | OUTPATIENT
Start: 2023-11-16 | End: 2023-11-16 | Stop reason: HOSPADM

## 2023-11-16 RX ORDER — SODIUM CHLORIDE, SODIUM LACTATE, POTASSIUM CHLORIDE, CALCIUM CHLORIDE 600; 310; 30; 20 MG/100ML; MG/100ML; MG/100ML; MG/100ML
INJECTION, SOLUTION INTRAVENOUS CONTINUOUS
Status: DISCONTINUED | OUTPATIENT
Start: 2023-11-16 | End: 2023-11-16

## 2023-11-16 RX ORDER — DIAZEPAM 5 MG/1
10 TABLET ORAL ONCE
Status: COMPLETED | OUTPATIENT
Start: 2023-11-16 | End: 2023-11-16

## 2023-11-16 RX ORDER — HYDROMORPHONE HYDROCHLORIDE 2 MG/ML
0.5 INJECTION, SOLUTION INTRAMUSCULAR; INTRAVENOUS; SUBCUTANEOUS EVERY 5 MIN PRN
Status: DISCONTINUED | OUTPATIENT
Start: 2023-11-16 | End: 2023-11-16 | Stop reason: HOSPADM

## 2023-11-16 RX ORDER — ONDANSETRON 2 MG/ML
8 INJECTION INTRAMUSCULAR; INTRAVENOUS EVERY 6 HOURS PRN
Status: DISCONTINUED | OUTPATIENT
Start: 2023-11-16 | End: 2023-11-21 | Stop reason: HOSPADM

## 2023-11-16 RX ORDER — METRONIDAZOLE 500 MG/100ML
500 INJECTION, SOLUTION INTRAVENOUS
Status: DISCONTINUED | OUTPATIENT
Start: 2023-11-16 | End: 2023-11-21

## 2023-11-16 RX ORDER — GUAIFENESIN/DEXTROMETHORPHAN 100-10MG/5
10 SYRUP ORAL EVERY 6 HOURS PRN
Status: DISCONTINUED | OUTPATIENT
Start: 2023-11-16 | End: 2023-11-21 | Stop reason: HOSPADM

## 2023-11-16 RX ORDER — SODIUM CHLORIDE, SODIUM LACTATE, POTASSIUM CHLORIDE, CALCIUM CHLORIDE 600; 310; 30; 20 MG/100ML; MG/100ML; MG/100ML; MG/100ML
125 INJECTION, SOLUTION INTRAVENOUS CONTINUOUS
Status: DISCONTINUED | OUTPATIENT
Start: 2023-11-16 | End: 2023-11-17

## 2023-11-16 RX ORDER — SODIUM CHLORIDE 450 MG/100ML
INJECTION, SOLUTION INTRAVENOUS CONTINUOUS
Status: DISCONTINUED | OUTPATIENT
Start: 2023-11-16 | End: 2023-11-21 | Stop reason: HOSPADM

## 2023-11-16 RX ADMIN — ONDANSETRON 8 MG: 2 INJECTION INTRAMUSCULAR; INTRAVENOUS at 11:11

## 2023-11-16 RX ADMIN — HYDROMORPHONE HYDROCHLORIDE 0.5 MG: 2 INJECTION, SOLUTION INTRAMUSCULAR; INTRAVENOUS; SUBCUTANEOUS at 05:11

## 2023-11-16 RX ADMIN — SODIUM CHLORIDE: 4.5 INJECTION, SOLUTION INTRAVENOUS at 10:11

## 2023-11-16 RX ADMIN — ONDANSETRON 4 MG: 2 INJECTION INTRAMUSCULAR; INTRAVENOUS at 05:11

## 2023-11-16 RX ADMIN — DIAZEPAM 10 MG: 5 TABLET ORAL at 01:11

## 2023-11-16 RX ADMIN — LIDOCAINE HYDROCHLORIDE 100 MG: 20 INJECTION, SOLUTION INTRAVENOUS at 02:11

## 2023-11-16 RX ADMIN — SODIUM CHLORIDE: 9 INJECTION, SOLUTION INTRAVENOUS at 02:11

## 2023-11-16 RX ADMIN — SODIUM CHLORIDE, POTASSIUM CHLORIDE, SODIUM LACTATE AND CALCIUM CHLORIDE 125 ML/HR: 600; 310; 30; 20 INJECTION, SOLUTION INTRAVENOUS at 07:11

## 2023-11-16 RX ADMIN — ROCURONIUM BROMIDE 100 MG: 10 INJECTION, SOLUTION INTRAVENOUS at 02:11

## 2023-11-16 RX ADMIN — EPHEDRINE SULFATE 25 MG: 50 INJECTION INTRAVENOUS at 02:11

## 2023-11-16 RX ADMIN — ONDANSETRON 8 MG: 2 INJECTION INTRAMUSCULAR; INTRAVENOUS at 02:11

## 2023-11-16 RX ADMIN — PROPOFOL 200 MG: 10 INJECTION, EMULSION INTRAVENOUS at 02:11

## 2023-11-16 RX ADMIN — FENTANYL CITRATE 100 MCG: 50 INJECTION INTRAMUSCULAR; INTRAVENOUS at 02:11

## 2023-11-16 RX ADMIN — MORPHINE SULFATE 6 MG: 2 INJECTION, SOLUTION INTRAMUSCULAR; INTRAVENOUS at 11:11

## 2023-11-16 RX ADMIN — INSULIN DETEMIR 10 UNITS: 100 INJECTION, SOLUTION SUBCUTANEOUS at 11:11

## 2023-11-16 RX ADMIN — CEFAZOLIN 2 G: 1 INJECTION, POWDER, FOR SOLUTION INTRAMUSCULAR; INTRAVENOUS; PARENTERAL at 02:11

## 2023-11-16 RX ADMIN — METRONIDAZOLE 500 MG: 500 INJECTION, SOLUTION INTRAVENOUS at 02:11

## 2023-11-16 RX ADMIN — SODIUM CHLORIDE, POTASSIUM CHLORIDE, SODIUM LACTATE AND CALCIUM CHLORIDE: 600; 310; 30; 20 INJECTION, SOLUTION INTRAVENOUS at 01:11

## 2023-11-16 RX ADMIN — MIDAZOLAM 2 MG: 1 INJECTION INTRAMUSCULAR; INTRAVENOUS at 02:11

## 2023-11-16 RX ADMIN — MORPHINE SULFATE 4 MG: 4 INJECTION, SOLUTION INTRAMUSCULAR; INTRAVENOUS at 07:11

## 2023-11-16 NOTE — ANESTHESIA PROCEDURE NOTES
Intubation    Date/Time: 11/16/2023 2:47 PM    Performed by: Antonio Torres CRNA  Authorized by: Antonio Torres CRNA    Intubation:     Induction:  Intravenous    Intubated:  Postinduction    Mask Ventilation:  Easy mask    Attempts:  1    Attempted By:  CRNA    Method of Intubation:  Direct    Blade:  Cedric 3    Laryngeal View Grade: Grade I - full view of cords      Difficult Airway Encountered?: No      Complications:  None    Airway Device:  Oral endotracheal tube    Airway Device Size:  7.5    Style/Cuff Inflation:  Cuffed    Inflation Amount (mL):  7    Tube secured:  23    Secured at:  The lips    Placement Verified By:  Capnometry    Complicating Factors:  None    Findings Post-Intubation:  BS equal bilateral and atraumatic/condition of teeth unchanged

## 2023-11-16 NOTE — ANESTHESIA PREPROCEDURE EVALUATION
11/16/2023  Russel Hill is a 48 y.o., male.      Pre-op Assessment    I have reviewed the Patient Summary Reports.     I have reviewed the Nursing Notes. I have reviewed the NPO Status.   I have reviewed the Medications.     Review of Systems  Anesthesia Hx:  No problems with previous Anesthesia                Social:  Non-Smoker, No Alcohol Use       Hematology/Oncology:  Hematology Normal   Oncology Normal                                   EENT/Dental:  EENT/Dental Normal           Cardiovascular:     Hypertension           hyperlipidemia                             Pulmonary:  Pulmonary Normal                       Renal/:  Renal/ Normal                 Hepatic/GI:  Hepatic/GI Normal                 Musculoskeletal:  Musculoskeletal Normal                Neurological:  Neurology Normal                                      Endocrine:  Diabetes, well controlled, type 2           Dermatological:  Skin Normal    Psych:  Psychiatric Normal                    Physical Exam  General: Well nourished    Airway:  Mallampati: II / II  Mouth Opening: Normal  TM Distance: > 6 cm  Tongue: Normal  Neck ROM: Normal ROM    Chest/Lungs:  Clear to auscultation, Normal Respiratory Rate    Heart:  Rate: Normal  Rhythm: Regular Rhythm        Anesthesia Plan  Type of Anesthesia, risks & benefits discussed:    Anesthesia Type: Gen ETT  Intra-op Monitoring Plan: Standard ASA Monitors  Post Op Pain Control Plan: multimodal analgesia  Induction:  IV  Informed Consent: Informed consent signed with the Patient and all parties understand the risks and agree with anesthesia plan.  All questions answered. Patient consented to blood products? Yes  ASA Score: 2  Day of Surgery Review of History & Physical: H&P Update referred to the surgeon/provider.I have interviewed and examined the patient. I have reviewed the patient's H&P  dated: There are no significant changes.     Ready For Surgery From Anesthesia Perspective.     .

## 2023-11-16 NOTE — TRANSFER OF CARE
"Anesthesia Transfer of Care Note    Patient: Russel Hill    Procedure(s) Performed: Procedure(s) (LRB):  COLON RESECTION (Right)    Patient location: PACU    Anesthesia Type: general    Transport from OR: Transported from OR on 6-10 L/min O2 by face mask with adequate spontaneous ventilation    Post pain: adequate analgesia    Post assessment: no apparent anesthetic complications    Post vital signs: stable    Level of consciousness: responds to stimulation and sedated    Nausea/Vomiting: no nausea/vomiting    Complications: none    Transfer of care protocol was followedComments: Good SV continue, NAD noted, VSS, RTRN      Last vitals: Visit Vitals  /67 (BP Location: Right arm, Patient Position: Lying)   Pulse (!) 8   Temp 36.5 °C (97.7 °F) (Oral)   Resp (!) 24   Ht 5' 7" (1.702 m)   Wt 102.1 kg (225 lb)   SpO2 100%   BMI 35.24 kg/m²     "

## 2023-11-16 NOTE — OP NOTE
[facility]     Operative Note    SUMMARY     Date of Procedure: 11/16/2023     Procedure: Procedure(s) (LRB):  COLON RESECTION (Right)     Surgeon(s) and Role:     * Boby Godwin MD - Primary    Assisting Surgeon: None    Pre-Operative Diagnosis: Malignant neoplasm of colon, unspecified part of colon [C18.9]    Post-Operative Diagnosis: Post-Op Diagnosis Codes:     * Malignant neoplasm of colon, unspecified part of colon [C18.9]    Anesthesia: General    Technical Procedures Used: The patient was brought to the operating room and placed in supine position. General anesthesia was administered. The abdomen was prepped and draped in standard fashion. Incision  was then performed in the midline, and carried down through subcutaneous tissue and fascia. Peritoneal cavity was carefully entered. An Adriano retractor was placed. The right colon was then mobilized incising along the white line of Toldt and carrying this up around the hepatic flexure. The transverse mesocolon was dissected away from the duodenum. The distal transection point of the ileum was decided upon and KONRAD 75 was used to make this transection. Likewise, the proximal transverse colon was transected with an additional firing of the KONRAD-75. The mesentery was then taken down with the ileocolic artery which was suture ligated adjacent to the duodenal flexure. The remaining mesenteric attachments were taken with the LigaSure. Specimen was then passed off the table. A stapled side to side anastomosis was created with the KONRAD-75. The resultant enterotomy was closed with a TA stapler. Mesentery defect was closed with interrupted  silk suture. The anastomosis was widely patent by palpation. A single tension reduction suture was placed at the apex. The abdomen was irrigated with saline. After suctioning free and confirmed hemostasis, the fascia was closed using continuous running PDS. Soft tissues were then irrigated, and  Insorb  clips were used to reapproximate skin edges.     Description of the Findings of the Procedure:  right colon resection was performed to removed tumor, as indicated by the tattooed area of the colon, in the cecum. Stapled ileocolostomy was created     Assistant(s): None    Complications: No    Estimated Blood Loss (EBL): 25 mL           Implants: * No implants in log *    Specimens: Right colon, appendix, terminal ileum  Specimen (24h ago, onward)       Start     Ordered    11/16/23 1541  Surgical Pathology  RELEASE UPON ORDERING         11/16/23 1285                     Condition: Good      Complications:  None

## 2023-11-17 LAB
ALBUMIN SERPL BCP-MCNC: 3.7 G/DL (ref 3.5–5)
ALBUMIN/GLOB SERPL: 1 {RATIO}
ALP SERPL-CCNC: 58 U/L (ref 45–115)
ALT SERPL W P-5'-P-CCNC: 40 U/L (ref 16–61)
ANION GAP SERPL CALCULATED.3IONS-SCNC: 14 MMOL/L (ref 7–16)
AST SERPL W P-5'-P-CCNC: 14 U/L (ref 15–37)
BASOPHILS # BLD AUTO: 0.03 K/UL (ref 0–0.2)
BASOPHILS NFR BLD AUTO: 0.3 % (ref 0–1)
BILIRUB SERPL-MCNC: 1.4 MG/DL (ref ?–1.2)
BUN SERPL-MCNC: 11 MG/DL (ref 7–18)
BUN/CREAT SERPL: 10 (ref 6–20)
CALCIUM SERPL-MCNC: 9.4 MG/DL (ref 8.5–10.1)
CHLORIDE SERPL-SCNC: 103 MMOL/L (ref 98–107)
CO2 SERPL-SCNC: 27 MMOL/L (ref 21–32)
CREAT SERPL-MCNC: 1.08 MG/DL (ref 0.7–1.3)
DIFFERENTIAL METHOD BLD: ABNORMAL
EGFR (NO RACE VARIABLE) (RUSH/TITUS): 85 ML/MIN/1.73M2
EOSINOPHIL # BLD AUTO: 0 K/UL (ref 0–0.5)
EOSINOPHIL NFR BLD AUTO: 0 % (ref 1–4)
ERYTHROCYTE [DISTWIDTH] IN BLOOD BY AUTOMATED COUNT: 15.1 % (ref 11.5–14.5)
GLOBULIN SER-MCNC: 3.8 G/DL (ref 2–4)
GLUCOSE SERPL-MCNC: 107 MG/DL (ref 70–105)
GLUCOSE SERPL-MCNC: 117 MG/DL (ref 70–105)
GLUCOSE SERPL-MCNC: 130 MG/DL (ref 74–106)
GLUCOSE SERPL-MCNC: 83 MG/DL (ref 70–105)
HCT VFR BLD AUTO: 41.3 % (ref 40–54)
HGB BLD-MCNC: 13.5 G/DL (ref 13.5–18)
IMM GRANULOCYTES # BLD AUTO: 0.03 K/UL (ref 0–0.04)
IMM GRANULOCYTES NFR BLD: 0.3 % (ref 0–0.4)
LYMPHOCYTES # BLD AUTO: 1.67 K/UL (ref 1–4.8)
LYMPHOCYTES NFR BLD AUTO: 14.2 % (ref 27–41)
MCH RBC QN AUTO: 26.2 PG (ref 27–31)
MCHC RBC AUTO-ENTMCNC: 32.7 G/DL (ref 32–36)
MCV RBC AUTO: 80 FL (ref 80–96)
MONOCYTES # BLD AUTO: 0.55 K/UL (ref 0–0.8)
MONOCYTES NFR BLD AUTO: 4.7 % (ref 2–6)
MPC BLD CALC-MCNC: 10.2 FL (ref 9.4–12.4)
NEUTROPHILS # BLD AUTO: 9.48 K/UL (ref 1.8–7.7)
NEUTROPHILS NFR BLD AUTO: 80.5 % (ref 53–65)
NRBC # BLD AUTO: 0 X10E3/UL
NRBC, AUTO (.00): 0 %
PLATELET # BLD AUTO: 228 K/UL (ref 150–400)
POTASSIUM SERPL-SCNC: 4 MMOL/L (ref 3.5–5.1)
PROT SERPL-MCNC: 7.5 G/DL (ref 6.4–8.2)
RBC # BLD AUTO: 5.16 M/UL (ref 4.6–6.2)
SODIUM SERPL-SCNC: 140 MMOL/L (ref 136–145)
WBC # BLD AUTO: 11.76 K/UL (ref 4.5–11)

## 2023-11-17 PROCEDURE — 25000003 PHARM REV CODE 250: Performed by: SURGERY

## 2023-11-17 PROCEDURE — 63600175 PHARM REV CODE 636 W HCPCS: Performed by: SURGERY

## 2023-11-17 PROCEDURE — 11000001 HC ACUTE MED/SURG PRIVATE ROOM

## 2023-11-17 PROCEDURE — 85025 COMPLETE CBC W/AUTO DIFF WBC: CPT | Performed by: SURGERY

## 2023-11-17 PROCEDURE — 80053 COMPREHEN METABOLIC PANEL: CPT | Performed by: SURGERY

## 2023-11-17 PROCEDURE — 25000003 PHARM REV CODE 250: Performed by: HOSPITALIST

## 2023-11-17 PROCEDURE — 82962 GLUCOSE BLOOD TEST: CPT

## 2023-11-17 RX ADMIN — SODIUM CHLORIDE: 4.5 INJECTION, SOLUTION INTRAVENOUS at 05:11

## 2023-11-17 RX ADMIN — MORPHINE SULFATE 6 MG: 2 INJECTION, SOLUTION INTRAMUSCULAR; INTRAVENOUS at 09:11

## 2023-11-17 RX ADMIN — LISINOPRIL 20 MG: 20 TABLET ORAL at 09:11

## 2023-11-17 RX ADMIN — MORPHINE SULFATE 6 MG: 2 INJECTION, SOLUTION INTRAMUSCULAR; INTRAVENOUS at 08:11

## 2023-11-17 RX ADMIN — TRIAMTERENE AND HYDROCHLOROTHIAZIDE 1 TABLET: 37.5; 25 TABLET ORAL at 09:11

## 2023-11-17 RX ADMIN — SODIUM CHLORIDE: 4.5 INJECTION, SOLUTION INTRAVENOUS at 11:11

## 2023-11-17 RX ADMIN — ENOXAPARIN SODIUM 40 MG: 100 INJECTION SUBCUTANEOUS at 04:11

## 2023-11-17 RX ADMIN — SODIUM CHLORIDE: 4.5 INJECTION, SOLUTION INTRAVENOUS at 04:11

## 2023-11-17 RX ADMIN — MORPHINE SULFATE 6 MG: 2 INJECTION, SOLUTION INTRAMUSCULAR; INTRAVENOUS at 04:11

## 2023-11-17 RX ADMIN — NIFEDIPINE 60 MG: 60 TABLET, FILM COATED, EXTENDED RELEASE ORAL at 09:11

## 2023-11-17 RX ADMIN — PRAVASTATIN SODIUM 20 MG: 10 TABLET ORAL at 09:11

## 2023-11-17 RX ADMIN — MORPHINE SULFATE 6 MG: 2 INJECTION, SOLUTION INTRAMUSCULAR; INTRAVENOUS at 02:11

## 2023-11-17 NOTE — PLAN OF CARE
[facility]  Initial Discharge Assessment       Primary Care Provider: Charlotte Martinez FNP    Admission Diagnosis: Malignant neoplasm of colon, unspecified part of colon [C18.9]  Malignant tumor of colon [C18.9]    Admission Date: 11/16/2023  Expected Discharge Date:     Transition of Care Barriers: None    Payor: [employer] / Plan: [facility] CHOICE PLUS / Product Type: Commercial /     Extended Emergency Contact Information  Primary Emergency Contact: MELITON BARILLAS  Mobile Phone: [phone]  Relation: Friend  Preferred language: English   needed? No    Discharge Plan A: Home with family  Discharge Plan B: Home with family      [facility] - [place], [place] - [address]  [address]  [place] [address]  Phone: [phone] Fax: [phone]     [facility] - [place], [place] - [address]  [address]  [place] [address]  Phone: [phone] Fax: [phone]    [facility], [place] - [address]  [address]  [place] [address]  Phone: [phone] Fax: [phone]      Initial Assessment (most recent)       Adult Discharge Assessment - 11/17/23 1230          Discharge Assessment    Assessment Type Discharge Planning Assessment     Source of Information patient;family     If unable to respond/provide information was family/caregiver contacted? Yes     Contact Name/Number Meliton Barillas (fiancee) [phone]     Communicated CYNTHIA with patient/caregiver Date not available/Unable to determine     Reason For Admission malignant neoplasm of colon     People in Home child(tara), dependent;significant other     Facility Arrived From: home     Do you expect to return to your current living situation? Yes     Do you have help at home or someone to help you manage your care at home? Yes     Who are your caregiver(s) and their phone number(s)? Meliton Malachi kinsey) [phone]     Prior to hospitilization cognitive status:  Unable to Assess     Current cognitive status: Alert/Oriented     Home Accessibility wheelchair accessible     Home Layout Able to live on 1st floor     Equipment Currently Used at Home none     Readmission within 30 days? No     Patient currently being followed by outpatient case management? No     Do you currently have service(s) that help you manage your care at home? No     Do you take prescription medications? Yes     Do you have prescription coverage? Yes     Do you have any problems affording any of your prescribed medications? TBD     Is the patient taking medications as prescribed? yes     Who is going to help you get home at discharge? Tomeka euceda) [phone]     How do you get to doctors appointments? car, drives self;family or friend will provide     Are you on dialysis? No     Do you take coumadin? No     DME Needed Upon Discharge  none     Discharge Plan discussed with: Patient;Spouse/sig other     Name(s) and Number(s) Tomeka Ly (fiancee) [phone]     Transition of Care Barriers None     Discharge Plan A Home with family     Discharge Plan B Home with family        Physical Activity    On average, how many days per week do you engage in moderate to strenuous exercise (like a brisk walk)? 0 days     On average, how many minutes do you engage in exercise at this level? 0 min        Financial Resource Strain    How hard is it for you to pay for the very basics like food, housing, medical care, and heating? Not hard at all        Housing Stability    In the last 12 months, was there a time when you were not able to pay the mortgage or rent on time? No     In the last 12 months, how many places have you lived? 1     In the last 12 months, was there a time when you did not have a steady place to sleep or slept in a shelter (including now)? No        Transportation Needs    In the past 12 months, has lack of transportation kept you from medical appointments or from getting medications?  No     In the past 12 months, has lack of transportation kept you from meetings, work, or from getting things needed for daily living? No        Food Insecurity    Within the past 12 months, you worried that your food would run out before you got the money to buy more. Never true     Within the past 12 months, the food you bought just didn't last and you didn't have money to get more. Never true        Stress    Do you feel stress - tense, restless, nervous, or anxious, or unable to sleep at night because your mind is troubled all the time - these days? Not at all        Social Connections    In a typical week, how many times do you talk on the phone with family, friends, or neighbors? More than three times a week     How often do you get together with friends or relatives? More than three times a week     How often do you attend Congregation or Zoroastrian services? More than 4 times per year     Do you belong to any clubs or organizations such as Congregation groups, unions, fraternal or athletic groups, or school groups? No     How often do you attend meetings of the clubs or organizations you belong to? Never     Are you , , , , never , or living with a partner? Living with partner        Alcohol Use    Q1: How often do you have a drink containing alcohol? Never     Q2: How many drinks containing alcohol do you have on a typical day when you are drinking? Patient does not drink     Q3: How often do you have six or more drinks on one occasion? Never        OTHER    Name(s) of People in Home Tomeka euceda) [phone]                    spoke with pt and Tomeka Ly (gabbi) [phone]  at bedside to complete dcp initial assessment. Pta, pt lived home with gabbi and son. No hh, no dme. Dcp is to return home with family. Ss following for dc needs and recommendations.

## 2023-11-17 NOTE — ASSESSMENT & PLAN NOTE
Agree with pain management and DVT prophylaxis.    NPO except for meds at present.  JOI per primary team.

## 2023-11-17 NOTE — CONSULTS
[facility] Hospital Medicine  Consult Note    Patient Name: Russel Hill  MRN: [id number]  Admission Date: 11/16/2023  Hospital Length of Stay: 0 days  Attending Physician: Boby Godwin MD   Primary Care Provider: Charlotte Martinez FNP           Patient information was obtained from patient, spouse/SO, past medical records, and ER records.     Consults  Subjective:     Principal Problem: Malignant neoplasm of colon    Chief Complaint: No chief complaint on file.       HPI: 47 yo M underwent right hemicolectomy and ileocolostomy for adenocarcinoma of the colon.  Patient had chronic constipation and underwent a colonoscopy two weeks ago and pathology from polyp returned as adenocarcinoma.  Patient has just returned from surgery and is requesting pain medication and an emesis bag.  He is concerned about getting his night time clonidine because his BP is slightly elevated which could be from pain.  BS is 108 and he is on glucophage and ozempic as OP with A1c 5.9.          Past Medical History:   Diagnosis Date    Erectile dysfunction     Hypercholesteremia 03/04/2016    Primary hypertension     Type 2 diabetes mellitus without complication, without long-term current use of insulin        Past Surgical History:   Procedure Laterality Date    CIRCUMCISION  1996       Review of patient's allergies indicates:   Allergen Reactions    Marijuana/cannabinoid      seizure    Melon      watermellon       No current facility-administered medications on file prior to encounter.     Current Outpatient Medications on File Prior to Encounter   Medication Sig    cloNIDine (CATAPRES) 0.2 MG tablet Take 1 tablet (0.2 mg total) by mouth every evening.    lisinopriL (PRINIVIL,ZESTRIL) 20 MG tablet Take 1 tablet (20 mg total) by mouth once daily.    metFORMIN (GLUCOPHAGE) 500 MG tablet Take 1 tablet (500 mg total) by mouth daily with breakfast.    NIFEdipine (ADALAT CC) 60 MG TbSR Take 1 tablet (60 mg  total) by mouth once daily.    potassium chloride SA (K-DUR,KLOR-CON) 20 MEQ tablet Take 1 tablet (20 mEq total) by mouth once daily.    pravastatin (PRAVACHOL) 20 MG tablet Take 1 tablet (20 mg total) by mouth once daily.    semaglutide (OZEMPIC) 1 mg/dose (4 mg/3 mL) Inject 1 mg into the skin every 7 days.    triamterene-hydrochlorothiazide 37.5-25 mg (DYAZIDE) 37.5-25 mg per capsule Take 1 capsule by mouth once daily.    [DISCONTINUED] ciprofloxacin HCl (CIPRO) 500 MG tablet Take 1 by mouth on the night prior to surgery at 9pm and 11pm    [DISCONTINUED] metroNIDAZOLE (FLAGYL) 500 MG tablet Take 1 by mouth on the night prior to surgery at 9pm and 11pm    [DISCONTINUED] polyethylene glycol (GLYCOLAX) 17 gram/dose powder Take 17 g by mouth 2 (two) times daily.     Family History       Problem Relation (Age of Onset)    Cancer Mother, Maternal Grandmother, Maternal Grandfather, Paternal Grandmother, Paternal Grandfather    Diabetes Mother, Father, Brother, Maternal Grandmother, Maternal Grandfather, Paternal Grandmother, Paternal Grandfather    Heart disease Brother    Heart failure Brother, Brother    Hypertension Mother, Father, Sister, Brother, Brother, Maternal Grandmother, Maternal Grandfather, Paternal Grandmother, Paternal Grandfather    Kidney failure Brother    No Known Problems Daughter, Daughter, Daughter, Daughter, Son, Son          Tobacco Use    Smoking status: Never    Smokeless tobacco: Never   Substance and Sexual Activity    Alcohol use: Yes     Comment: drinks some liquor one day each weekend    Drug use: Not Currently     Comment: marijuana caused a seizure    Sexual activity: Yes     Partners: Female     Birth control/protection: None     Review of Systems   Constitutional:  Negative for appetite change, chills, fatigue, fever and unexpected weight change.   HENT:  Negative for congestion, mouth sores, nosebleeds, sinus pain, sore throat and trouble swallowing.    Respiratory:  Negative for  apnea, cough, chest tightness and shortness of breath.    Cardiovascular:  Negative for chest pain, palpitations and leg swelling.   Gastrointestinal:  Positive for abdominal pain and nausea. Negative for blood in stool, constipation, diarrhea and vomiting.   Genitourinary:  Negative for decreased urine volume, difficulty urinating, dysuria and frequency.   Musculoskeletal:  Negative for arthralgias, back pain and neck pain.   Skin:  Negative for rash.   Neurological:  Negative for syncope, light-headedness and headaches.   Hematological:  Does not bruise/bleed easily.   Psychiatric/Behavioral:  Negative for confusion and suicidal ideas.      Objective:     Vital Signs (Most Recent):  Temp: 98.6 °F (37 °C) (11/16/23 1911)  Pulse: 92 (11/16/23 1911)  Resp: 18 (11/16/23 1911)  BP: (!) 144/79 (11/16/23 1911)  SpO2: 95 % (11/16/23 1911) Vital Signs (24h Range):  Temp:  [97.7 °F (36.5 °C)-98.6 °F (37 °C)] 98.6 °F (37 °C)  Pulse:  [] 92  Resp:  [11-24] 18  SpO2:  [94 %-100 %] 95 %  BP: (128-158)/(66-89) 144/79     Weight: 102.1 kg (225 lb 1.4 oz)  Body mass index is 35.25 kg/m².     Physical Exam  Vitals and nursing note reviewed. Exam conducted with a chaperone present.   Constitutional:       Appearance: Normal appearance.   HENT:      Head: Atraumatic.      Mouth/Throat:      Mouth: Mucous membranes are moist.      Pharynx: Oropharynx is clear.   Eyes:      Conjunctiva/sclera: Conjunctivae normal.      Pupils: Pupils are equal, round, and reactive to light.   Cardiovascular:      Rate and Rhythm: Normal rate and regular rhythm.      Pulses: Normal pulses.      Heart sounds: Normal heart sounds.   Pulmonary:      Effort: Pulmonary effort is normal.      Breath sounds: Normal breath sounds.   Abdominal:      General: Abdomen is flat. Bowel sounds are normal. There is no distension.      Palpations: Abdomen is soft.      Tenderness: There is abdominal tenderness. There is no guarding or rebound.   Musculoskeletal:          General: No deformity or signs of injury. Normal range of motion.      Cervical back: Neck supple.      Right lower leg: No edema.      Left lower leg: No edema.   Skin:     General: Skin is warm and dry.      Coloration: Skin is not jaundiced or pale.      Findings: No bruising, lesion or rash.   Neurological:      General: No focal deficit present.      Mental Status: He is alert and oriented to person, place, and time.   Psychiatric:         Mood and Affect: Mood normal.          Significant Labs: All pertinent labs within the past 24 hours have been reviewed.    Significant Imaging: I have reviewed all pertinent imaging results/findings within the past 24 hours.  Assessment/Plan:     * Malignant neoplasm of colon  Agree with pain management and DVT prophylaxis.    NPO except for meds at present.  JOI per primary team.        Primary hypertension  Chronic, controlled. Latest blood pressure and vitals reviewed-     Temp:  [97.7 °F (36.5 °C)-98.6 °F (37 °C)]   Pulse:  []   Resp:  [11-24]   BP: (128-158)/(66-89)   SpO2:  [94 %-100 %] .   Home meds for hypertension were reviewed and noted below.   Hypertension Medications               cloNIDine (CATAPRES) 0.2 MG tablet Take 1 tablet (0.2 mg total) by mouth every evening.    lisinopriL (PRINIVIL,ZESTRIL) 20 MG tablet Take 1 tablet (20 mg total) by mouth once daily.    NIFEdipine (ADALAT CC) 60 MG TbSR Take 1 tablet (60 mg total) by mouth once daily.    triamterene-hydrochlorothiazide 37.5-25 mg (DYAZIDE) 37.5-25 mg per capsule Take 1 capsule by mouth once daily.            While in the hospital, will manage blood pressure as follows; Continue home antihypertensive regimen    Will utilize p.r.n. blood pressure medication only if patient's blood pressure greater than 140/90 and he develops symptoms such as worsening chest pain or shortness of breath.    Type 2 diabetes mellitus without complication, without long-term current use of insulin  Patient's FSGs  "are controlled on current medication regimen.  Last A1c reviewed-   Lab Results   Component Value Date    HGBA1C 5.9 11/16/2023     Most recent fingerstick glucose reviewed- No results for input(s): "POCTGLUCOSE" in the last 24 hours.  Current correctional scale  Medium  Maintain anti-hyperglycemic dose as follows-   Antihyperglycemics (From admission, onward)      Start     Stop Route Frequency Ordered    11/16/23 2309  insulin aspart U-100 injection 0-10 Units         -- SubQ Every 6 hours PRN 11/16/23 2209 11/16/23 2215  insulin detemir U-100 injection 10 Units         -- SubQ Nightly 11/16/23 2209          Hold Oral hypoglycemics while patient is in the hospital.      VTE Risk Mitigation (From admission, onward)           Ordered     enoxaparin injection 40 mg  Daily         11/16/23 0847     IP VTE HIGH RISK PATIENT  Once         11/16/23 0847                        Thank you for your consult. I will follow-up with patient. Please contact us if you have any additional questions.    Mary Bhat MD  Department of Hospital Medicine   [facility]      "

## 2023-11-17 NOTE — ASSESSMENT & PLAN NOTE
Chronic, controlled. Latest blood pressure and vitals reviewed-     Temp:  [97.7 °F (36.5 °C)-98.6 °F (37 °C)]   Pulse:  []   Resp:  [11-24]   BP: (128-158)/(66-89)   SpO2:  [94 %-100 %] .   Home meds for hypertension were reviewed and noted below.   Hypertension Medications               cloNIDine (CATAPRES) 0.2 MG tablet Take 1 tablet (0.2 mg total) by mouth every evening.    lisinopriL (PRINIVIL,ZESTRIL) 20 MG tablet Take 1 tablet (20 mg total) by mouth once daily.    NIFEdipine (ADALAT CC) 60 MG TbSR Take 1 tablet (60 mg total) by mouth once daily.    triamterene-hydrochlorothiazide 37.5-25 mg (DYAZIDE) 37.5-25 mg per capsule Take 1 capsule by mouth once daily.            While in the hospital, will manage blood pressure as follows; Continue home antihypertensive regimen    Will utilize p.r.n. blood pressure medication only if patient's blood pressure greater than 140/90 and he develops symptoms such as worsening chest pain or shortness of breath.

## 2023-11-17 NOTE — ASSESSMENT & PLAN NOTE
"Patient's FSGs are controlled on current medication regimen.  Last A1c reviewed-   Lab Results   Component Value Date    HGBA1C 5.9 11/16/2023     Most recent fingerstick glucose reviewed- No results for input(s): "POCTGLUCOSE" in the last 24 hours.  Current correctional scale  Medium  Maintain anti-hyperglycemic dose as follows-   Antihyperglycemics (From admission, onward)      Start     Stop Route Frequency Ordered    11/16/23 2309  insulin aspart U-100 injection 0-10 Units         -- SubQ Every 6 hours PRN 11/16/23 2209 11/16/23 2215  insulin detemir U-100 injection 10 Units         -- SubQ Nightly 11/16/23 2209          Hold Oral hypoglycemics while patient is in the hospital.  "

## 2023-11-17 NOTE — PROGRESS NOTES
POD 1 right colon resection  Vitals good  Labs ok; bili 1.4? Today    Abd soft, approp tender    Increase ambulation  Await ileus resolution.

## 2023-11-17 NOTE — H&P
[facility] Medicine  History & Physical    Patient Name: Russel Hill  MRN: [id number]  Patient Class: IP- Inpatient  Admission Date: 11/16/2023  Attending Physician: Boby Godwin MD   Primary Care Provider: Charlotte Martinez FNP         Patient information was obtained from patient, spouse/SO, and ER records.     Subjective:     Principal Problem:Malignant neoplasm of colon    Chief Complaint: No chief complaint on file.       HPI: 49 yo M underwent right hemicolectomy and ileocolostomy for adenocarcinoma of the colon.  Patient had chronic constipation and underwent a colonoscopy two weeks ago and pathology from polyp returned as adenocarcinoma.  Patient has just returned from surgery and is requesting pain medication and an emesis bag.  He is concerned about getting his night time clonidine because his BP is slightly elevated which could be from pain.  BS is 108 and he is on glucophage and ozempic as OP with A1c 5.9.          Past Medical History:   Diagnosis Date    Erectile dysfunction     Hypercholesteremia 03/04/2016    Primary hypertension     Type 2 diabetes mellitus without complication, without long-term current use of insulin        Past Surgical History:   Procedure Laterality Date    CIRCUMCISION  1996       Review of patient's allergies indicates:   Allergen Reactions    Marijuana/cannabinoid      seizure    Melon      watermellon       No current facility-administered medications on file prior to encounter.     Current Outpatient Medications on File Prior to Encounter   Medication Sig    cloNIDine (CATAPRES) 0.2 MG tablet Take 1 tablet (0.2 mg total) by mouth every evening.    lisinopriL (PRINIVIL,ZESTRIL) 20 MG tablet Take 1 tablet (20 mg total) by mouth once daily.    metFORMIN (GLUCOPHAGE) 500 MG tablet Take 1 tablet (500 mg total) by mouth daily with breakfast.    NIFEdipine (ADALAT CC) 60 MG TbSR Take 1 tablet (60 mg total) by mouth once daily.     potassium chloride SA (K-DUR,KLOR-CON) 20 MEQ tablet Take 1 tablet (20 mEq total) by mouth once daily.    pravastatin (PRAVACHOL) 20 MG tablet Take 1 tablet (20 mg total) by mouth once daily.    semaglutide (OZEMPIC) 1 mg/dose (4 mg/3 mL) Inject 1 mg into the skin every 7 days.    triamterene-hydrochlorothiazide 37.5-25 mg (DYAZIDE) 37.5-25 mg per capsule Take 1 capsule by mouth once daily.    [DISCONTINUED] ciprofloxacin HCl (CIPRO) 500 MG tablet Take 1 by mouth on the night prior to surgery at 9pm and 11pm    [DISCONTINUED] metroNIDAZOLE (FLAGYL) 500 MG tablet Take 1 by mouth on the night prior to surgery at 9pm and 11pm    [DISCONTINUED] polyethylene glycol (GLYCOLAX) 17 gram/dose powder Take 17 g by mouth 2 (two) times daily.     Family History       Problem Relation (Age of Onset)    Cancer Mother, Maternal Grandmother, Maternal Grandfather, Paternal Grandmother, Paternal Grandfather    Diabetes Mother, Father, Brother, Maternal Grandmother, Maternal Grandfather, Paternal Grandmother, Paternal Grandfather    Heart disease Brother    Heart failure Brother, Brother    Hypertension Mother, Father, Sister, Brother, Brother, Maternal Grandmother, Maternal Grandfather, Paternal Grandmother, Paternal Grandfather    Kidney failure Brother    No Known Problems Daughter, Daughter, Daughter, Daughter, Son, Son          Tobacco Use    Smoking status: Never    Smokeless tobacco: Never   Substance and Sexual Activity    Alcohol use: Yes     Comment: drinks some liquor one day each weekend    Drug use: Not Currently     Comment: marijuana caused a seizure    Sexual activity: Yes     Partners: Female     Birth control/protection: None     Review of Systems   Constitutional:  Negative for appetite change, chills, fatigue, fever and unexpected weight change.   HENT:  Negative for congestion, mouth sores, nosebleeds, sinus pain, sore throat and trouble swallowing.    Respiratory:  Negative for apnea, cough, chest tightness and  shortness of breath.    Cardiovascular:  Negative for chest pain, palpitations and leg swelling.   Gastrointestinal:  Positive for abdominal pain and nausea. Negative for blood in stool, constipation, diarrhea and vomiting.   Genitourinary:  Negative for decreased urine volume, difficulty urinating, dysuria and frequency.   Musculoskeletal:  Negative for arthralgias, back pain and neck pain.   Skin:  Negative for rash.   Neurological:  Negative for syncope, light-headedness and headaches.   Hematological:  Does not bruise/bleed easily.   Psychiatric/Behavioral:  Negative for confusion and suicidal ideas.      Objective:     Vital Signs (Most Recent):  Temp: 98.6 °F (37 °C) (11/16/23 1911)  Pulse: 92 (11/16/23 1911)  Resp: 18 (11/16/23 1911)  BP: (!) 144/79 (11/16/23 1911)  SpO2: 95 % (11/16/23 1911) Vital Signs (24h Range):  Temp:  [97.7 °F (36.5 °C)-98.6 °F (37 °C)] 98.6 °F (37 °C)  Pulse:  [] 92  Resp:  [11-24] 18  SpO2:  [94 %-100 %] 95 %  BP: (128-158)/(66-89) 144/79     Weight: 102.1 kg (225 lb 1.4 oz)  Body mass index is 35.25 kg/m².     Physical Exam  Vitals and nursing note reviewed. Exam conducted with a chaperone present.   Constitutional:       Appearance: Normal appearance.   HENT:      Head: Atraumatic.      Mouth/Throat:      Mouth: Mucous membranes are moist.      Pharynx: Oropharynx is clear.   Eyes:      Conjunctiva/sclera: Conjunctivae normal.      Pupils: Pupils are equal, round, and reactive to light.   Cardiovascular:      Rate and Rhythm: Normal rate and regular rhythm.      Pulses: Normal pulses.      Heart sounds: Normal heart sounds.   Pulmonary:      Effort: Pulmonary effort is normal.      Breath sounds: Normal breath sounds.   Abdominal:      General: Abdomen is flat. Bowel sounds are normal. There is no distension.      Palpations: Abdomen is soft.      Tenderness: There is abdominal tenderness. There is no guarding or rebound.   Musculoskeletal:         General: No deformity or  signs of injury. Normal range of motion.      Cervical back: Neck supple.      Right lower leg: No edema.      Left lower leg: No edema.   Skin:     General: Skin is warm and dry.      Coloration: Skin is not jaundiced or pale.      Findings: No bruising, lesion or rash.   Neurological:      General: No focal deficit present.      Mental Status: He is alert and oriented to person, place, and time.   Psychiatric:         Mood and Affect: Mood normal.          Significant Labs: All pertinent labs within the past 24 hours have been reviewed.    Significant Imaging: I have reviewed all pertinent imaging results/findings within the past 24 hours.  Assessment/Plan:     * Malignant neoplasm of colon  Agree with pain management and DVT prophylaxis.    NPO except for meds at present.  JOI per primary team.        Primary hypertension  Chronic, controlled. Latest blood pressure and vitals reviewed-     Temp:  [97.7 °F (36.5 °C)-98.6 °F (37 °C)]   Pulse:  []   Resp:  [11-24]   BP: (128-158)/(66-89)   SpO2:  [94 %-100 %] .   Home meds for hypertension were reviewed and noted below.   Hypertension Medications               cloNIDine (CATAPRES) 0.2 MG tablet Take 1 tablet (0.2 mg total) by mouth every evening.    lisinopriL (PRINIVIL,ZESTRIL) 20 MG tablet Take 1 tablet (20 mg total) by mouth once daily.    NIFEdipine (ADALAT CC) 60 MG TbSR Take 1 tablet (60 mg total) by mouth once daily.    triamterene-hydrochlorothiazide 37.5-25 mg (DYAZIDE) 37.5-25 mg per capsule Take 1 capsule by mouth once daily.            While in the hospital, will manage blood pressure as follows; Continue home antihypertensive regimen    Will utilize p.r.n. blood pressure medication only if patient's blood pressure greater than 140/90 and he develops symptoms such as worsening chest pain or shortness of breath.    Type 2 diabetes mellitus without complication, without long-term current use of insulin  Patient's FSGs are controlled on current  "medication regimen.  Last A1c reviewed-   Lab Results   Component Value Date    HGBA1C 5.9 11/16/2023     Most recent fingerstick glucose reviewed- No results for input(s): "POCTGLUCOSE" in the last 24 hours.  Current correctional scale  Medium  Maintain anti-hyperglycemic dose as follows-   Antihyperglycemics (From admission, onward)      Start     Stop Route Frequency Ordered    11/16/23 2309  insulin aspart U-100 injection 0-10 Units         -- SubQ Every 6 hours PRN 11/16/23 2209 11/16/23 2215  insulin detemir U-100 injection 10 Units         -- SubQ Nightly 11/16/23 2209          Hold Oral hypoglycemics while patient is in the hospital.      VTE Risk Mitigation (From admission, onward)           Ordered     enoxaparin injection 40 mg  Daily         11/16/23 0847     IP VTE HIGH RISK PATIENT  Once         11/16/23 0847                                   Mary Bhat MD  Department of Hospital Medicine  [facility]    COMPLETED  Family history is reviewed and has not changed   Pertinent information:        "

## 2023-11-17 NOTE — ANESTHESIA POSTPROCEDURE EVALUATION
Anesthesia Post Evaluation    Patient: Russel Hill    Procedure(s) Performed: Procedure(s) (LRB):  COLON RESECTION (Right)    Final Anesthesia Type: general      Patient location during evaluation: PACU  Patient participation: Yes- Able to Participate  Level of consciousness: awake and sedated  Post-procedure vital signs: reviewed and stable  Pain management: adequate  Airway patency: patent    PONV status at discharge: No PONV  Anesthetic complications: no      Cardiovascular status: blood pressure returned to baseline  Respiratory status: unassisted  Hydration status: euvolemic  Follow-up not needed.          Vitals Value Taken Time   /74 11/16/23 1749   Temp 36.5 °C (97.7 °F) 11/16/23 1705   Pulse 91 11/16/23 1751   Resp 22 11/16/23 1751   SpO2 95 % 11/16/23 1751   Vitals shown include unvalidated device data.      No case tracking events are documented in the log.      Pain/Carmen Score: Pain Rating Prior to Med Admin: 9 (11/16/2023  5:30 PM)  Pain Rating Post Med Admin: 5 (11/16/2023  5:35 PM)  Carmen Score: 9 (11/16/2023  5:35 PM)

## 2023-11-17 NOTE — OR NURSING
1705 REC'D TO PACU ASLEEP BUT EASY TO AROUSE IN STABLE CONDITION. VSS. SATS 100% ON 6L/FM W/ ORAL AIRWAY IN PLACE. NO S/S OF PAIN NOTED AT THIS TIME. ABD DSG C/D/I. . WILL CONT TO MONITOR.     1720 ORAL AIRWAY REMOVED. PT TOLERATED. SATS 98% ON RA. UPDATED GIVEN TO FRIEND VIA TEXT MESSAGE.     1725 DILAUDID 0.5MG GIVEN FOR ABD PAIN RATED 10/10 ON PAIN SCALE. WILL TITRATE FOR PAIN CONTROL.     1735 CALLED DR STEVENS TO VERIFY ORDER FOR NGT PLACE. DR STEVENS DOES NOT WANT TUBE PLACED.     1757 ZOFRAN 4MG GIVEN FOR NAUSEA.    1805 TRANSFERRED TO ROOM 458 IN STABLE CONDITION. /82 HR 97 RR 16 T 97.9 SATS 98% ON RA. REPORT GIVEN TO SHAMIKA SEN.

## 2023-11-17 NOTE — SUBJECTIVE & OBJECTIVE
Past Medical History:   Diagnosis Date    Erectile dysfunction     Hypercholesteremia 03/04/2016    Primary hypertension     Type 2 diabetes mellitus without complication, without long-term current use of insulin        Past Surgical History:   Procedure Laterality Date    CIRCUMCISION  1996       Review of patient's allergies indicates:   Allergen Reactions    Marijuana/cannabinoid      seizure    Melon      watermellochris       No current facility-administered medications on file prior to encounter.     Current Outpatient Medications on File Prior to Encounter   Medication Sig    cloNIDine (CATAPRES) 0.2 MG tablet Take 1 tablet (0.2 mg total) by mouth every evening.    lisinopriL (PRINIVIL,ZESTRIL) 20 MG tablet Take 1 tablet (20 mg total) by mouth once daily.    metFORMIN (GLUCOPHAGE) 500 MG tablet Take 1 tablet (500 mg total) by mouth daily with breakfast.    NIFEdipine (ADALAT CC) 60 MG TbSR Take 1 tablet (60 mg total) by mouth once daily.    potassium chloride SA (K-DUR,KLOR-CON) 20 MEQ tablet Take 1 tablet (20 mEq total) by mouth once daily.    pravastatin (PRAVACHOL) 20 MG tablet Take 1 tablet (20 mg total) by mouth once daily.    semaglutide (OZEMPIC) 1 mg/dose (4 mg/3 mL) Inject 1 mg into the skin every 7 days.    triamterene-hydrochlorothiazide 37.5-25 mg (DYAZIDE) 37.5-25 mg per capsule Take 1 capsule by mouth once daily.    [DISCONTINUED] ciprofloxacin HCl (CIPRO) 500 MG tablet Take 1 by mouth on the night prior to surgery at 9pm and 11pm    [DISCONTINUED] metroNIDAZOLE (FLAGYL) 500 MG tablet Take 1 by mouth on the night prior to surgery at 9pm and 11pm    [DISCONTINUED] polyethylene glycol (GLYCOLAX) 17 gram/dose powder Take 17 g by mouth 2 (two) times daily.     Family History       Problem Relation (Age of Onset)    Cancer Mother, Maternal Grandmother, Maternal Grandfather, Paternal Grandmother, Paternal Grandfather    Diabetes Mother, Father, Brother, Maternal Grandmother, Maternal Grandfather, Paternal  Grandmother, Paternal Grandfather    Heart disease Brother    Heart failure Brother, Brother    Hypertension Mother, Father, Sister, Brother, Brother, Maternal Grandmother, Maternal Grandfather, Paternal Grandmother, Paternal Grandfather    Kidney failure Brother    No Known Problems Daughter, Daughter, Daughter, Daughter, Son, Son          Tobacco Use    Smoking status: Never    Smokeless tobacco: Never   Substance and Sexual Activity    Alcohol use: Yes     Comment: drinks some liquor one day each weekend    Drug use: Not Currently     Comment: marijuana caused a seizure    Sexual activity: Yes     Partners: Female     Birth control/protection: None     Review of Systems   Constitutional:  Negative for appetite change, chills, fatigue, fever and unexpected weight change.   HENT:  Negative for congestion, mouth sores, nosebleeds, sinus pain, sore throat and trouble swallowing.    Respiratory:  Negative for apnea, cough, chest tightness and shortness of breath.    Cardiovascular:  Negative for chest pain, palpitations and leg swelling.   Gastrointestinal:  Positive for abdominal pain and nausea. Negative for blood in stool, constipation, diarrhea and vomiting.   Genitourinary:  Negative for decreased urine volume, difficulty urinating, dysuria and frequency.   Musculoskeletal:  Negative for arthralgias, back pain and neck pain.   Skin:  Negative for rash.   Neurological:  Negative for syncope, light-headedness and headaches.   Hematological:  Does not bruise/bleed easily.   Psychiatric/Behavioral:  Negative for confusion and suicidal ideas.      Objective:     Vital Signs (Most Recent):  Temp: 98.6 °F (37 °C) (11/16/23 1911)  Pulse: 92 (11/16/23 1911)  Resp: 18 (11/16/23 1911)  BP: (!) 144/79 (11/16/23 1911)  SpO2: 95 % (11/16/23 1911) Vital Signs (24h Range):  Temp:  [97.7 °F (36.5 °C)-98.6 °F (37 °C)] 98.6 °F (37 °C)  Pulse:  [] 92  Resp:  [11-24] 18  SpO2:  [94 %-100 %] 95 %  BP: (128-158)/(66-89) 144/79      Weight: 102.1 kg (225 lb 1.4 oz)  Body mass index is 35.25 kg/m².     Physical Exam  Vitals and nursing note reviewed. Exam conducted with a chaperone present.   Constitutional:       Appearance: Normal appearance.   HENT:      Head: Atraumatic.      Mouth/Throat:      Mouth: Mucous membranes are moist.      Pharynx: Oropharynx is clear.   Eyes:      Conjunctiva/sclera: Conjunctivae normal.      Pupils: Pupils are equal, round, and reactive to light.   Cardiovascular:      Rate and Rhythm: Normal rate and regular rhythm.      Pulses: Normal pulses.      Heart sounds: Normal heart sounds.   Pulmonary:      Effort: Pulmonary effort is normal.      Breath sounds: Normal breath sounds.   Abdominal:      General: Abdomen is flat. Bowel sounds are normal. There is no distension.      Palpations: Abdomen is soft.      Tenderness: There is abdominal tenderness. There is no guarding or rebound.   Musculoskeletal:         General: No deformity or signs of injury. Normal range of motion.      Cervical back: Neck supple.      Right lower leg: No edema.      Left lower leg: No edema.   Skin:     General: Skin is warm and dry.      Coloration: Skin is not jaundiced or pale.      Findings: No bruising, lesion or rash.   Neurological:      General: No focal deficit present.      Mental Status: He is alert and oriented to person, place, and time.   Psychiatric:         Mood and Affect: Mood normal.          Significant Labs: All pertinent labs within the past 24 hours have been reviewed.    Significant Imaging: I have reviewed all pertinent imaging results/findings within the past 24 hours.

## 2023-11-17 NOTE — HPI
49 yo M underwent right hemicolectomy and ileocolostomy for adenocarcinoma of the colon.  Patient had chronic constipation and underwent a colonoscopy two weeks ago and pathology from polyp returned as adenocarcinoma.  Patient has just returned from surgery and is requesting pain medication and an emesis bag.  He is concerned about getting his night time clonidine because his BP is slightly elevated which could be from pain.  BS is 108 and he is on glucophage and ozempic as OP with A1c 5.9.

## 2023-11-18 LAB
ALBUMIN SERPL BCP-MCNC: 3.6 G/DL (ref 3.5–5)
ALBUMIN/GLOB SERPL: 0.9 {RATIO}
ALP SERPL-CCNC: 59 U/L (ref 45–115)
ALT SERPL W P-5'-P-CCNC: 32 U/L (ref 16–61)
ANION GAP SERPL CALCULATED.3IONS-SCNC: 11 MMOL/L (ref 7–16)
AST SERPL W P-5'-P-CCNC: 12 U/L (ref 15–37)
BASOPHILS # BLD AUTO: 0.04 K/UL (ref 0–0.2)
BASOPHILS NFR BLD AUTO: 0.4 % (ref 0–1)
BILIRUB SERPL-MCNC: 2 MG/DL (ref ?–1.2)
BUN SERPL-MCNC: 10 MG/DL (ref 7–18)
BUN/CREAT SERPL: 10 (ref 6–20)
CALCIUM SERPL-MCNC: 9.4 MG/DL (ref 8.5–10.1)
CHLORIDE SERPL-SCNC: 100 MMOL/L (ref 98–107)
CO2 SERPL-SCNC: 28 MMOL/L (ref 21–32)
CREAT SERPL-MCNC: 1.05 MG/DL (ref 0.7–1.3)
DIFFERENTIAL METHOD BLD: ABNORMAL
EGFR (NO RACE VARIABLE) (RUSH/TITUS): 88 ML/MIN/1.73M2
EOSINOPHIL # BLD AUTO: 0.07 K/UL (ref 0–0.5)
EOSINOPHIL NFR BLD AUTO: 0.7 % (ref 1–4)
ERYTHROCYTE [DISTWIDTH] IN BLOOD BY AUTOMATED COUNT: 15.2 % (ref 11.5–14.5)
GLOBULIN SER-MCNC: 3.9 G/DL (ref 2–4)
GLUCOSE SERPL-MCNC: 103 MG/DL (ref 70–105)
GLUCOSE SERPL-MCNC: 85 MG/DL (ref 70–105)
GLUCOSE SERPL-MCNC: 87 MG/DL (ref 70–105)
GLUCOSE SERPL-MCNC: 89 MG/DL (ref 74–106)
GLUCOSE SERPL-MCNC: 96 MG/DL (ref 70–105)
HCT VFR BLD AUTO: 42.4 % (ref 40–54)
HGB BLD-MCNC: 13.6 G/DL (ref 13.5–18)
IMM GRANULOCYTES # BLD AUTO: 0.04 K/UL (ref 0–0.04)
IMM GRANULOCYTES NFR BLD: 0.4 % (ref 0–0.4)
LYMPHOCYTES # BLD AUTO: 2.7 K/UL (ref 1–4.8)
LYMPHOCYTES NFR BLD AUTO: 27.4 % (ref 27–41)
MCH RBC QN AUTO: 26.1 PG (ref 27–31)
MCHC RBC AUTO-ENTMCNC: 32.1 G/DL (ref 32–36)
MCV RBC AUTO: 81.4 FL (ref 80–96)
MONOCYTES # BLD AUTO: 0.7 K/UL (ref 0–0.8)
MONOCYTES NFR BLD AUTO: 7.1 % (ref 2–6)
MPC BLD CALC-MCNC: 10.6 FL (ref 9.4–12.4)
NEUTROPHILS # BLD AUTO: 6.32 K/UL (ref 1.8–7.7)
NEUTROPHILS NFR BLD AUTO: 64 % (ref 53–65)
NRBC # BLD AUTO: 0 X10E3/UL
NRBC, AUTO (.00): 0 %
PLATELET # BLD AUTO: 214 K/UL (ref 150–400)
POTASSIUM SERPL-SCNC: 3.8 MMOL/L (ref 3.5–5.1)
PROT SERPL-MCNC: 7.5 G/DL (ref 6.4–8.2)
RBC # BLD AUTO: 5.21 M/UL (ref 4.6–6.2)
SODIUM SERPL-SCNC: 135 MMOL/L (ref 136–145)
WBC # BLD AUTO: 9.87 K/UL (ref 4.5–11)

## 2023-11-18 PROCEDURE — 80053 COMPREHEN METABOLIC PANEL: CPT | Performed by: SURGERY

## 2023-11-18 PROCEDURE — 11000001 HC ACUTE MED/SURG PRIVATE ROOM

## 2023-11-18 PROCEDURE — 25000003 PHARM REV CODE 250: Performed by: HOSPITALIST

## 2023-11-18 PROCEDURE — 82962 GLUCOSE BLOOD TEST: CPT

## 2023-11-18 PROCEDURE — 25000003 PHARM REV CODE 250: Performed by: SURGERY

## 2023-11-18 PROCEDURE — 85025 COMPLETE CBC W/AUTO DIFF WBC: CPT | Performed by: SURGERY

## 2023-11-18 PROCEDURE — 63600175 PHARM REV CODE 636 W HCPCS: Performed by: SURGERY

## 2023-11-18 RX ADMIN — TRIAMTERENE AND HYDROCHLOROTHIAZIDE 1 TABLET: 37.5; 25 TABLET ORAL at 08:11

## 2023-11-18 RX ADMIN — LISINOPRIL 20 MG: 20 TABLET ORAL at 08:11

## 2023-11-18 RX ADMIN — PRAVASTATIN SODIUM 20 MG: 10 TABLET ORAL at 08:11

## 2023-11-18 RX ADMIN — MORPHINE SULFATE 6 MG: 2 INJECTION, SOLUTION INTRAMUSCULAR; INTRAVENOUS at 04:11

## 2023-11-18 RX ADMIN — SODIUM CHLORIDE: 4.5 INJECTION, SOLUTION INTRAVENOUS at 08:11

## 2023-11-18 RX ADMIN — NIFEDIPINE 60 MG: 60 TABLET, FILM COATED, EXTENDED RELEASE ORAL at 08:11

## 2023-11-18 RX ADMIN — ENOXAPARIN SODIUM 40 MG: 100 INJECTION SUBCUTANEOUS at 04:11

## 2023-11-18 RX ADMIN — MORPHINE SULFATE 6 MG: 2 INJECTION, SOLUTION INTRAMUSCULAR; INTRAVENOUS at 05:11

## 2023-11-18 NOTE — PROGRESS NOTES
[facility]  General Surgery  Progress Note    Subjective:     Interval History:  Postop day 2.  Patient no problems no nausea or vomiting but no flatus or bowel movements yet.  Ambulating with no issues    Post-Op Info:  Procedure(s) (LRB):  COLON RESECTION (Right)   2 Days Post-Op      Medications:  Continuous Infusions:   sodium chloride 0.45% 125 mL/hr at 11/18/23 0825     Scheduled Meds:   cloNIDine  0.2 mg Oral QHS    enoxparin  40 mg Subcutaneous Daily    insulin detemir U-100  10 Units Subcutaneous QHS    lisinopriL  20 mg Oral Daily    NIFEdipine  60 mg Oral Daily    pravastatin  20 mg Oral Daily    triamterene-hydrochlorothiazide 37.5-25 mg  1 tablet Oral Daily     PRN Meds:acetaminophen, acetaminophen, bisacodyL, dextromethorphan-guaiFENesin  mg/5 ml, dextrose 10%, dextrose 10%, glucagon (human recombinant), hydrALAZINE, insulin aspart U-100, metronidazole, morphine, morphine, ondansetron, simethicone, traZODone     Objective:     Vital Signs (Most Recent):  Temp: 99.2 °F (37.3 °C) (11/18/23 0600)  Pulse: 89 (11/18/23 0600)  Resp: 18 (11/18/23 0600)  BP: 105/62 (11/18/23 0600)  SpO2: 95 % (11/18/23 0600) Vital Signs (24h Range):  Temp:  [97.8 °F (36.6 °C)-99.4 °F (37.4 °C)] 99.2 °F (37.3 °C)  Pulse:  [78-92] 89  Resp:  [18-19] 18  SpO2:  [93 %-96 %] 95 %  BP: (105-150)/(62-82) 105/62       Intake/Output Summary (Last 24 hours) at 11/18/2023 1049  Last data filed at 11/18/2023 0549  Gross per 24 hour   Intake 1500 ml   Output 900 ml   Net 600 ml       Physical Exam  Constitutional:       General: He is not in acute distress.  HENT:      Head: Normocephalic.   Cardiovascular:      Rate and Rhythm: Normal rate and regular rhythm.      Pulses: Normal pulses.   Pulmonary:      Effort: Pulmonary effort is normal. No respiratory distress.      Breath sounds: Normal breath sounds.   Abdominal:      General: Abdomen is flat. There is no distension.      Palpations: Abdomen is soft.       "Tenderness: There is abdominal tenderness.   Musculoskeletal:         General: Normal range of motion.   Skin:     General: Skin is warm.   Neurological:      General: No focal deficit present.      Mental Status: He is oriented to person, place, and time.     Incision well healed clean dry and intact    Significant Labs:  Cardiac markers: No results for input(s): "CKMB", "CPKMB", "TROPONINT", "TROPONINI", "MYOGLOBIN" in the last 48 hours.  CBC:   Recent Labs   Lab 11/18/23  0443   WBC 9.87   RBC 5.21   HGB 13.6   HCT 42.4      MCV 81.4   MCH 26.1*   MCHC 32.1     CMP:   Recent Labs   Lab 11/18/23  0443   GLU 89   CALCIUM 9.4   ALBUMIN 3.6   PROT 7.5   *   K 3.8   CO2 28      BUN 10   CREATININE 1.05   ALKPHOS 59   ALT 32   AST 12*   BILITOT 2.0*       Significant Diagnostics:  None      Assessment/Plan:     Active Diagnoses:    Diagnosis Date Noted POA    PRINCIPAL PROBLEM:  Malignant neoplasm of colon [C18.9] 10/31/2023 Yes    Primary hypertension [I10]  Yes     Chronic    Type 2 diabetes mellitus without complication, without long-term current use of insulin [E11.9]  Yes     Chronic      Problems Resolved During this Admission:     Clear liquid diet.    Papa Chapa MD  General Surgery  [facility]  "

## 2023-11-18 NOTE — PROGRESS NOTES
[facility]  Adult Nutrition  First Assessment Note         Reason for Assessment  Reason For Assessment: identified at risk by screening criteria (MST3)   Nutrition Risk Screen: no indicators present    Assessment and Plan  Patient assessed for MST2. He was admitted 11/16 for malignant neoplasm of colon.     Patient is 225 pounds with a BMI of 35.25 and is obese. Needs adjusted. He is currently ordered a clear liquid diet s/p right colon resection to remove tumor. Recommend advancing diet towards 2000kcal Consistent Carbohydrate as medically appropriate. If unable to advance x 3 days, recommend consider alternate route of nutrition to better meet nutritional needs. Also recommend addition of Boost Breeze  TID to improve kcal/protein intakes to better meet nutritional needs.     Patient endorsed poor po intakes on malnutrition risk screen. Chart review reveals no significant weight changes in past six months. Will visit with patient on follow-up to perform NFPE and obtain diet history.     Last BM 11/17 per flowsheet.    Medications/labs reviewed.     Malnutrition  Is Patient Malnourished: No    Skin Integrity  Baldo Risk Assessment  Sensory Perception: 4-->no impairment  Moisture: 4-->rarely moist  Activity: 4-->walks frequently  Mobility: 4-->no limitation  Nutrition: 3-->adequate  Friction and Shear: 3-->no apparent problem  Baldo Score: 22      Nutrition Diagnosis  Increased Protein Needs related to Catabolic illness as evidenced by cancer diagnosis      Nutrition Risk  Level of Risk/Frequency of Follow-up: moderate - high      Recent Labs   Lab 11/18/23  0443 11/18/23  0557 11/18/23  1146   GLU 89  --   --    POCGLU  --    < > 96    < > = values in this interval not displayed.         Nutrition Prescription / Recommendations  Recommendation/Intervention: Recommend advance diet as medically appropriate. Recommend 2000kcal Consistent Carbohydrate Diet. If unable to advance x 3 days,  recommend consider alternate route of nutrition to meet nutritional needs.  Goals: Diet advancement, weight maintenance  Nutrition Goal Status: new  Current Diet Order: Clear liquid  Chewing or Swallowing Difficulty?: No Chewing or swallowing difficulty  Recommended Diet: Consistent Carbohydrate 2000 (75g Carbs)  Recommended Oral Supplement: Boost Breeze [250kcals, 9g Protein, 54g Carbs] 3 times a day  Is Nutrition Support Recommended: [facility] Nutrition Support: No  Is Nutrition Education Recommended: No    Monitor and Evaluation  % current Intake: New Diet order with no P.O. intake documented currently  % intake to meet estimated needs: P.O. + Supplements  Food and Nutrient Intake: food and beverage intake  Food and Nutrient Adminstration: diet order  Anthropometric Measurements: weight, weight change  Biochemical Data, Medical Tests and Procedures: electrolyte and renal panel, gastrointestinal profile, glucose/endocrine profile, inflammatory profile, lipid profile       Current Medical Diagnosis and Past Medical History  Diagnosis: cancer diagnosis/related complications  Past Medical History:   Diagnosis Date    Erectile dysfunction     Hypercholesteremia 03/04/2016    Primary hypertension     Type 2 diabetes mellitus without complication, without long-term current use of insulin        Nutrition/Diet History  Spiritual, Cultural Beliefs, [employer]al Practices, Values that Affect Care: no    Lab/Procedures/Meds  Recent Labs   Lab 11/18/23  0443   *   K 3.8   BUN 10   CREATININE 1.05   CALCIUM 9.4   ALBUMIN 3.6      ALT 32   AST 12*   Note: Na+ low, Recommend consider replete to WNL as appropriate.    Last A1c:   Lab Results   Component Value Date    HGBA1C 5.9 11/16/2023     Lab Results   Component Value Date    RBC 5.21 11/18/2023    HGB 13.6 11/18/2023    HCT 42.4 11/18/2023    MCV 81.4 11/18/2023    MCH 26.1 (L) 11/18/2023    MCHC 32.1 11/18/2023     Pertinent Labs Reviewed: reviewed  Pertinent  "Medications Reviewed: reviewed  Scheduled Meds:   cloNIDine  0.2 mg Oral QHS    enoxparin  40 mg Subcutaneous Daily    insulin detemir U-100  10 Units Subcutaneous QHS    lisinopriL  20 mg Oral Daily    NIFEdipine  60 mg Oral Daily    pravastatin  20 mg Oral Daily    triamterene-hydrochlorothiazide 37.5-25 mg  1 tablet Oral Daily     Continuous Infusions:   sodium chloride 0.45% 125 mL/hr at 11/18/23 0825     PRN Meds:.acetaminophen, acetaminophen, bisacodyL, dextromethorphan-guaiFENesin  mg/5 ml, dextrose 10%, dextrose 10%, glucagon (human recombinant), hydrALAZINE, insulin aspart U-100, metronidazole, morphine, morphine, ondansetron, simethicone, traZODone      Anthropometrics  Temp: 99.2 °F (37.3 °C)  Height Method: Stated  Height: 5' 7" (170.2 cm)  Height (inches): 67 in  Weight Method: Standard Scale  Weight: 102.1 kg (225 lb 1.4 oz)  Weight (lb): 225.09 lb  Ideal Body Weight (IBW), Male: 148 lb  % Ideal Body Weight, Male (lb): 152.09 %  BMI (Calculated): 35.2       Estimated/Assessed Needs  RMR ([place]-St. Jeor Equation): 1849.63     Temp: 99.2 °F (37.3 °C)Oral  Weight Used For Calorie Calculations: 76.2 kg (168 lb)     Energy Calorie Requirements (kcal): 1905-2286kcal (25-30kcal/kg adj BW)  Weight Used For Protein Calculations: 76.2 kg (168 lb)  Protein Requirements: 76-92g (1.0-1.2g/kg Adj BW)       RDA Method (mL): 1905       Nutrition by Nursing  Diet/Nutrition Received: NPO           Last Bowel Movement: 11/17/23                Nutrition Follow-Up  RD Follow-up?: Yes      Deana Almodovar MS, RD, LD  Available via Secure Chat  "

## 2023-11-19 LAB
GLUCOSE SERPL-MCNC: 100 MG/DL (ref 70–105)
GLUCOSE SERPL-MCNC: 116 MG/DL (ref 70–105)
GLUCOSE SERPL-MCNC: 125 MG/DL (ref 70–105)
GLUCOSE SERPL-MCNC: 92 MG/DL (ref 70–105)

## 2023-11-19 PROCEDURE — 11000001 HC ACUTE MED/SURG PRIVATE ROOM

## 2023-11-19 PROCEDURE — 82962 GLUCOSE BLOOD TEST: CPT

## 2023-11-19 PROCEDURE — 63600175 PHARM REV CODE 636 W HCPCS: Performed by: HOSPITALIST

## 2023-11-19 PROCEDURE — 63600175 PHARM REV CODE 636 W HCPCS: Performed by: SURGERY

## 2023-11-19 PROCEDURE — 25000003 PHARM REV CODE 250: Performed by: SURGERY

## 2023-11-19 PROCEDURE — 25000003 PHARM REV CODE 250: Performed by: HOSPITALIST

## 2023-11-19 RX ORDER — BISACODYL 10 MG
10 SUPPOSITORY, RECTAL RECTAL DAILY
Status: DISCONTINUED | OUTPATIENT
Start: 2023-11-19 | End: 2023-11-21 | Stop reason: HOSPADM

## 2023-11-19 RX ORDER — HYDROCODONE BITARTRATE AND ACETAMINOPHEN 7.5; 325 MG/1; MG/1
1 TABLET ORAL EVERY 6 HOURS PRN
Status: DISCONTINUED | OUTPATIENT
Start: 2023-11-19 | End: 2023-11-21 | Stop reason: HOSPADM

## 2023-11-19 RX ADMIN — SODIUM CHLORIDE: 4.5 INJECTION, SOLUTION INTRAVENOUS at 08:11

## 2023-11-19 RX ADMIN — TRIAMTERENE AND HYDROCHLOROTHIAZIDE 1 TABLET: 37.5; 25 TABLET ORAL at 09:11

## 2023-11-19 RX ADMIN — LISINOPRIL 20 MG: 20 TABLET ORAL at 09:11

## 2023-11-19 RX ADMIN — NIFEDIPINE 60 MG: 60 TABLET, FILM COATED, EXTENDED RELEASE ORAL at 09:11

## 2023-11-19 RX ADMIN — HYDROCODONE BITARTRATE AND ACETAMINOPHEN 1 TABLET: 7.5; 325 TABLET ORAL at 10:11

## 2023-11-19 RX ADMIN — SODIUM CHLORIDE: 4.5 INJECTION, SOLUTION INTRAVENOUS at 02:11

## 2023-11-19 RX ADMIN — ENOXAPARIN SODIUM 40 MG: 100 INJECTION SUBCUTANEOUS at 05:11

## 2023-11-19 RX ADMIN — SODIUM CHLORIDE: 4.5 INJECTION, SOLUTION INTRAVENOUS at 10:11

## 2023-11-19 RX ADMIN — HYDROCODONE BITARTRATE AND ACETAMINOPHEN 1 TABLET: 7.5; 325 TABLET ORAL at 02:11

## 2023-11-19 RX ADMIN — BISACODYL 10 MG: 10 SUPPOSITORY RECTAL at 09:11

## 2023-11-19 RX ADMIN — PRAVASTATIN SODIUM 20 MG: 10 TABLET ORAL at 09:11

## 2023-11-19 RX ADMIN — INSULIN DETEMIR 10 UNITS: 100 INJECTION, SOLUTION SUBCUTANEOUS at 08:11

## 2023-11-19 NOTE — PROGRESS NOTES
[facility]  General Surgery  Progress Note    Subjective:     Interval History:  Patient doing well overall.  No nausea or vomiting no flatus or bowel movements yet polyp.  Tolerating clear liquid diet.  Positive bowel sounds    Post-Op Info:  Procedure(s) (LRB):  COLON RESECTION (Right)   3 Days Post-Op      Medications:  Continuous Infusions:   sodium chloride 0.45% 125 mL/hr at 11/19/23 0202     Scheduled Meds:   bisacodyL  10 mg Rectal Daily    cloNIDine  0.2 mg Oral QHS    enoxparin  40 mg Subcutaneous Daily    insulin detemir U-100  10 Units Subcutaneous QHS    lisinopriL  20 mg Oral Daily    NIFEdipine  60 mg Oral Daily    pravastatin  20 mg Oral Daily    triamterene-hydrochlorothiazide 37.5-25 mg  1 tablet Oral Daily     PRN Meds:acetaminophen, acetaminophen, bisacodyL, dextromethorphan-guaiFENesin  mg/5 ml, dextrose 10%, dextrose 10%, glucagon (human recombinant), hydrALAZINE, HYDROcodone-acetaminophen, insulin aspart U-100, metronidazole, morphine, morphine, ondansetron, simethicone, traZODone     Objective:     Vital Signs (Most Recent):  Temp: 98.2 °F (36.8 °C) (11/19/23 0600)  Pulse: 76 (11/19/23 0600)  Resp: 18 (11/19/23 0600)  BP: 108/63 (11/19/23 0600)  SpO2: 96 % (11/19/23 0600) Vital Signs (24h Range):  Temp:  [97.9 °F (36.6 °C)-99.6 °F (37.6 °C)] 98.2 °F (36.8 °C)  Pulse:  [75-96] 76  Resp:  [16-18] 18  SpO2:  [94 %-97 %] 96 %  BP: ()/(61-81) 108/63       Intake/Output Summary (Last 24 hours) at 11/19/2023 0929  Last data filed at 11/19/2023 0611  Gross per 24 hour   Intake 1880 ml   Output 300 ml   Net 1580 ml       Physical Exam  Constitutional:       General: He is not in acute distress.  HENT:      Head: Normocephalic.   Cardiovascular:      Rate and Rhythm: Normal rate and regular rhythm.      Pulses: Normal pulses.   Pulmonary:      Effort: Pulmonary effort is normal. No respiratory distress.      Breath sounds: Normal breath sounds.   Abdominal:       General: Abdomen is flat. There is no distension.      Palpations: Abdomen is soft.      Tenderness: There is no abdominal tenderness.   Musculoskeletal:         General: Normal range of motion.   Skin:     General: Skin is warm.   Neurological:      General: No focal deficit present.      Mental Status: He is oriented to person, place, and time.         Significant Labs:  CBC:   Recent Labs   Lab 11/18/23  0443   WBC 9.87   RBC 5.21   HGB 13.6   HCT 42.4      MCV 81.4   MCH 26.1*   MCHC 32.1     CMP:   Recent Labs   Lab 11/18/23  0443   GLU 89   CALCIUM 9.4   ALBUMIN 3.6   PROT 7.5   *   K 3.8   CO2 28      BUN 10   CREATININE 1.05   ALKPHOS 59   ALT 32   AST 12*   BILITOT 2.0*       Significant Diagnostics:  None    Assessment/Plan:     Active Diagnoses:    Diagnosis Date Noted POA    PRINCIPAL PROBLEM:  Malignant neoplasm of colon [C18.9] 10/31/2023 Yes    Primary hypertension [I10]  Yes     Chronic    Type 2 diabetes mellitus without complication, without long-term current use of insulin [E11.9]  Yes     Chronic      Problems Resolved During this Admission:     Soft mechanical diet.  Told to stop if he gets nauseous.  Encourage ambulation, Dulcolax suppository.  Oral pain medication.  Hopefully discharge soon.    Papa Chapa MD  General Surgery  [facility]

## 2023-11-20 LAB
ALBUMIN SERPL BCP-MCNC: 3.3 G/DL (ref 3.5–5)
ALBUMIN/GLOB SERPL: 0.8 {RATIO}
ALP SERPL-CCNC: 58 U/L (ref 45–115)
ALT SERPL W P-5'-P-CCNC: 30 U/L (ref 16–61)
ANION GAP SERPL CALCULATED.3IONS-SCNC: 9 MMOL/L (ref 7–16)
AST SERPL W P-5'-P-CCNC: 17 U/L (ref 15–37)
BILIRUB SERPL-MCNC: 0.9 MG/DL (ref ?–1.2)
BUN SERPL-MCNC: 12 MG/DL (ref 7–18)
BUN/CREAT SERPL: 12 (ref 6–20)
CALCIUM SERPL-MCNC: 9.3 MG/DL (ref 8.5–10.1)
CHLORIDE SERPL-SCNC: 99 MMOL/L (ref 98–107)
CO2 SERPL-SCNC: 34 MMOL/L (ref 21–32)
CREAT SERPL-MCNC: 1 MG/DL (ref 0.7–1.3)
EGFR (NO RACE VARIABLE) (RUSH/TITUS): 93 ML/MIN/1.73M2
GLOBULIN SER-MCNC: 4 G/DL (ref 2–4)
GLUCOSE SERPL-MCNC: 102 MG/DL (ref 70–105)
GLUCOSE SERPL-MCNC: 107 MG/DL (ref 70–105)
GLUCOSE SERPL-MCNC: 113 MG/DL (ref 70–105)
GLUCOSE SERPL-MCNC: 145 MG/DL (ref 74–106)
GLUCOSE SERPL-MCNC: 81 MG/DL (ref 70–105)
POTASSIUM SERPL-SCNC: 3.5 MMOL/L (ref 3.5–5.1)
PROT SERPL-MCNC: 7.3 G/DL (ref 6.4–8.2)
SODIUM SERPL-SCNC: 138 MMOL/L (ref 136–145)

## 2023-11-20 PROCEDURE — 80053 COMPREHEN METABOLIC PANEL: CPT | Performed by: SURGERY

## 2023-11-20 PROCEDURE — 63600175 PHARM REV CODE 636 W HCPCS: Performed by: SURGERY

## 2023-11-20 PROCEDURE — 82962 GLUCOSE BLOOD TEST: CPT

## 2023-11-20 PROCEDURE — 25000003 PHARM REV CODE 250: Performed by: SURGERY

## 2023-11-20 PROCEDURE — 25000003 PHARM REV CODE 250: Performed by: HOSPITALIST

## 2023-11-20 PROCEDURE — 11000001 HC ACUTE MED/SURG PRIVATE ROOM

## 2023-11-20 RX ADMIN — PRAVASTATIN SODIUM 20 MG: 10 TABLET ORAL at 09:11

## 2023-11-20 RX ADMIN — SODIUM CHLORIDE: 4.5 INJECTION, SOLUTION INTRAVENOUS at 02:11

## 2023-11-20 RX ADMIN — SODIUM CHLORIDE: 4.5 INJECTION, SOLUTION INTRAVENOUS at 06:11

## 2023-11-20 RX ADMIN — HYDROCODONE BITARTRATE AND ACETAMINOPHEN 1 TABLET: 7.5; 325 TABLET ORAL at 12:11

## 2023-11-20 RX ADMIN — BISACODYL 10 MG: 10 SUPPOSITORY RECTAL at 09:11

## 2023-11-20 RX ADMIN — HYDROCODONE BITARTRATE AND ACETAMINOPHEN 1 TABLET: 7.5; 325 TABLET ORAL at 09:11

## 2023-11-20 RX ADMIN — NIFEDIPINE 60 MG: 60 TABLET, FILM COATED, EXTENDED RELEASE ORAL at 09:11

## 2023-11-20 RX ADMIN — TRIAMTERENE AND HYDROCHLOROTHIAZIDE 1 TABLET: 37.5; 25 TABLET ORAL at 09:11

## 2023-11-20 RX ADMIN — SODIUM CHLORIDE: 4.5 INJECTION, SOLUTION INTRAVENOUS at 10:11

## 2023-11-20 RX ADMIN — LISINOPRIL 20 MG: 20 TABLET ORAL at 09:11

## 2023-11-20 RX ADMIN — ENOXAPARIN SODIUM 40 MG: 100 INJECTION SUBCUTANEOUS at 05:11

## 2023-11-20 NOTE — PROGRESS NOTES
[facility]  Adult Nutrition  First Assessment Note         Reason for Assessment  Reason For Assessment: RD follow-up   Nutrition Risk Screen: no indicators present    Assessment and Plan    11/20/2023: RD follow-up. Patient endorses poor po intakes. Likely r/t colon CA. Patient does not meet criteria for malnutrition per ASPEN guidelines.     Diet advanced to mechanical soft. No intake documented at present. Patient was consuming 100% of clear liquid. Recommend continue to advance diet towards 2000kcal Consistent Carbohydrate Diet as medically appropriate. Encourage good po intakes.     11/18/2023: Patient assessed for MST2. He was admitted 11/16 for malignant neoplasm of colon.     Patient is 225 pounds with a BMI of 35.25 and is obese. Needs adjusted. He is currently ordered a clear liquid diet s/p right colon resection to remove tumor. Recommend advancing diet towards 2000kcal Consistent Carbohydrate as medically appropriate. If unable to advance x 3 days, recommend consider alternate route of nutrition to better meet nutritional needs. Also recommend addition of Boost Breeze  TID to improve kcal/protein intakes to better meet nutritional needs.     Patient endorsed poor po intakes on malnutrition risk screen. Chart review reveals no significant weight changes in past six months. Will visit with patient on follow-up to perform NFPE and obtain diet history.     Last BM 11/19 per flowsheet.    Medications/labs reviewed.     Malnutrition  Is Patient Malnourished: No    Skin Integrity  Baldo Risk Assessment  Sensory Perception: 4-->no impairment  Moisture: 4-->rarely moist  Activity: 4-->walks frequently  Mobility: 4-->no limitation  Nutrition: 3-->adequate  Friction and Shear: 3-->no apparent problem  Baldo Score: 22      Nutrition Diagnosis  Increased Protein Needs related to Catabolic illness as evidenced by cancer diagnosis      Nutrition Risk  Level of Risk/Frequency of Follow-up:  low      Recent Labs   Lab 11/20/23  0621 11/20/23  0900   GLU  --  145*   POCGLU 81  --      Note: Glucose elevated. PMH DM2, likely exacerbated by stress response to colon resection      Nutrition Prescription / Recommendations  Recommendation/Intervention: Recommend continue current diet as tolerated. Advance towards 2000kcal Consistent Carbohydrate Diet as appropriate. Encourage good po intakes.  Goals: Weight maintenance, intake % of meals  Nutrition Goal Status: progressing towards goal  Current Diet Order: Dysphagia mechanical soft  Nutrition Order Comments: appropriate  Chewing or Swallowing Difficulty?: No Chewing or swallowing difficulty  Recommended Diet: Consistent Carbohydrate 2000 (75g Carbs)  Recommended Oral Supplement: Boost Breeze [250kcals, 9g Protein, 54g Carbs] 3 times a day  Is Nutrition Support Recommended: [facility] Nutrition Support: No  Is Nutrition Education Recommended: No    Monitor and Evaluation  % current Intake: New Diet order with no P.O. intake documented currently  % intake to meet estimated needs: P.O. + Supplements  Food and Nutrient Intake: food and beverage intake  Food and Nutrient Adminstration: diet order  Anthropometric Measurements: weight, weight change  Biochemical Data, Medical Tests and Procedures: electrolyte and renal panel, gastrointestinal profile, glucose/endocrine profile, inflammatory profile, lipid profile       Current Medical Diagnosis and Past Medical History  Diagnosis: cancer diagnosis/related complications  Past Medical History:   Diagnosis Date    Erectile dysfunction     Hypercholesteremia 03/04/2016    Primary hypertension     Type 2 diabetes mellitus without complication, without long-term current use of insulin        Nutrition/Diet History  Spiritual, Cultural Beliefs, Shinto Practices, Values that Affect Care: no    Lab/Procedures/Meds  Recent Labs   Lab 11/20/23  0900      K 3.5   BUN 12   CREATININE 1.00   CALCIUM 9.3   ALBUMIN  "3.3*   CL 99   ALT 30   AST 17   Note: Alb low, likely r/t stress response to colon resection.     Last A1c:   Lab Results   Component Value Date    HGBA1C 5.9 11/16/2023     Lab Results   Component Value Date    RBC 5.21 11/18/2023    HGB 13.6 11/18/2023    HCT 42.4 11/18/2023    MCV 81.4 11/18/2023    MCH 26.1 (L) 11/18/2023    MCHC 32.1 11/18/2023     Pertinent Labs Reviewed: reviewed  Pertinent Medications Reviewed: reviewed  Scheduled Meds:   bisacodyL  10 mg Rectal Daily    cloNIDine  0.2 mg Oral QHS    enoxparin  40 mg Subcutaneous Daily    insulin detemir U-100  10 Units Subcutaneous QHS    lisinopriL  20 mg Oral Daily    NIFEdipine  60 mg Oral Daily    pravastatin  20 mg Oral Daily    triamterene-hydrochlorothiazide 37.5-25 mg  1 tablet Oral Daily     Continuous Infusions:   sodium chloride 0.45% 125 mL/hr at 11/20/23 0619     PRN Meds:.acetaminophen, acetaminophen, bisacodyL, dextromethorphan-guaiFENesin  mg/5 ml, dextrose 10%, dextrose 10%, glucagon (human recombinant), hydrALAZINE, HYDROcodone-acetaminophen, insulin aspart U-100, metronidazole, morphine, morphine, ondansetron, simethicone, traZODone      Anthropometrics  Temp: 97.2 °F (36.2 °C)  Height Method: Stated  Height: 5' 7" (170.2 cm)  Height (inches): 67 in  Weight Method: Standard Scale  Weight: 102.1 kg (225 lb 1.4 oz)  Weight (lb): 225.09 lb  Ideal Body Weight (IBW), Male: 148 lb  % Ideal Body Weight, Male (lb): 152.09 %  BMI (Calculated): 35.2       Estimated/Assessed Needs  RMR ([place]-St. Jeor Equation): 1849.63     Temp: 97.2 °F (36.2 °C)Oral  Weight Used For Calorie Calculations: 76.2 kg (168 lb)     Energy Calorie Requirements (kcal): 1905-2286kcal (25-30kcal/kg adj BW)  Weight Used For Protein Calculations: 76.2 kg (168 lb)  Protein Requirements: 76-92g (1.0-1.2g/kg Adj BW)       RDA Method (mL): 1905       Nutrition by Nursing  Diet/Nutrition Received: clear liquid  Intake (%): 100%        Last Bowel Movement: 11/19/23 (pt's " girlfriend reported earlier today that he had two bms yesterday)                Nutrition Follow-Up  RD Follow-up?: Yes      Deana Almodovar MS, RD, LD  Available via Secure Chat

## 2023-11-21 VITALS
OXYGEN SATURATION: 96 % | HEART RATE: 80 BPM | TEMPERATURE: 99 F | RESPIRATION RATE: 20 BRPM | SYSTOLIC BLOOD PRESSURE: 120 MMHG | DIASTOLIC BLOOD PRESSURE: 70 MMHG | WEIGHT: 225.06 LBS | BODY MASS INDEX: 35.33 KG/M2 | HEIGHT: 67 IN

## 2023-11-21 LAB
ESTROGEN SERPL-MCNC: NORMAL PG/ML
GLUCOSE SERPL-MCNC: 129 MG/DL (ref 70–105)
GLUCOSE SERPL-MCNC: 85 MG/DL (ref 70–105)
GLUCOSE SERPL-MCNC: 86 MG/DL (ref 70–105)
INSULIN SERPL-ACNC: NORMAL U[IU]/ML
LAB AP GROSS DESCRIPTION: NORMAL
LAB AP LABORATORY NOTES: NORMAL
LAB AP SYNOPTIC CHECKLIST: NORMAL
T3RU NFR SERPL: NORMAL %

## 2023-11-21 PROCEDURE — 25000003 PHARM REV CODE 250: Performed by: SURGERY

## 2023-11-21 PROCEDURE — 63600175 PHARM REV CODE 636 W HCPCS: Performed by: SURGERY

## 2023-11-21 PROCEDURE — 25000003 PHARM REV CODE 250: Performed by: HOSPITALIST

## 2023-11-21 PROCEDURE — 82962 GLUCOSE BLOOD TEST: CPT

## 2023-11-21 RX ORDER — HYDROCODONE BITARTRATE AND ACETAMINOPHEN 7.5; 325 MG/1; MG/1
1 TABLET ORAL EVERY 8 HOURS PRN
Qty: 14 TABLET | Refills: 0 | Status: SHIPPED | OUTPATIENT
Start: 2023-11-21 | End: 2024-03-14 | Stop reason: ALTCHOICE

## 2023-11-21 RX ORDER — HYDROCODONE BITARTRATE AND ACETAMINOPHEN 7.5; 325 MG/1; MG/1
1 TABLET ORAL EVERY 6 HOURS PRN
Qty: 24 TABLET | Refills: 0 | Status: SHIPPED | OUTPATIENT
Start: 2023-11-21 | End: 2024-03-14 | Stop reason: ALTCHOICE

## 2023-11-21 RX ORDER — BISACODYL 5 MG
10 TABLET, DELAYED RELEASE (ENTERIC COATED) ORAL DAILY PRN
Qty: 10 TABLET | Refills: 0 | Status: SHIPPED | OUTPATIENT
Start: 2023-11-21

## 2023-11-21 RX ORDER — BISACODYL 5 MG
5 TABLET, DELAYED RELEASE (ENTERIC COATED) ORAL DAILY PRN
Qty: 15 TABLET | Refills: 0 | Status: SHIPPED | OUTPATIENT
Start: 2023-11-21 | End: 2023-11-28 | Stop reason: SDUPTHER

## 2023-11-21 RX ADMIN — TRIAMTERENE AND HYDROCHLOROTHIAZIDE 1 TABLET: 37.5; 25 TABLET ORAL at 08:11

## 2023-11-21 RX ADMIN — ENOXAPARIN SODIUM 40 MG: 100 INJECTION SUBCUTANEOUS at 05:11

## 2023-11-21 RX ADMIN — HYDROCODONE BITARTRATE AND ACETAMINOPHEN 1 TABLET: 7.5; 325 TABLET ORAL at 05:11

## 2023-11-21 RX ADMIN — NIFEDIPINE 60 MG: 60 TABLET, FILM COATED, EXTENDED RELEASE ORAL at 08:11

## 2023-11-21 RX ADMIN — BISACODYL 10 MG: 10 SUPPOSITORY RECTAL at 08:11

## 2023-11-21 RX ADMIN — LISINOPRIL 20 MG: 20 TABLET ORAL at 08:11

## 2023-11-21 RX ADMIN — PRAVASTATIN SODIUM 20 MG: 10 TABLET ORAL at 08:11

## 2023-11-21 NOTE — PROGRESS NOTES
Patient reports flatus but no significant BM.  Yesterday had a tiny solid bowel movement, but Dulcolax had been given.  He was having some mild nausea after eating breakfast this morning.      Abdomen is appropriately tender.      Continue slow advancement of diet   Continue ambulation.  Anticipate discharge within the next day or 2, pending full return of bowel function

## 2023-11-21 NOTE — PROGRESS NOTES
[facility]  General Surgery  Progress Note    Subjective:     History of Present Illness:  No notes on file    Post-Op Info:  Procedure(s) (LRB):  COLON RESECTION (Right)   5 Days Post-Op     Interval History: + flatus. W/o significant bm    Medications:  Continuous Infusions:   sodium chloride 0.45% 125 mL/hr at 11/20/23 2226     Scheduled Meds:   bisacodyL  10 mg Rectal Daily    cloNIDine  0.2 mg Oral QHS    enoxparin  40 mg Subcutaneous Daily    insulin detemir U-100  10 Units Subcutaneous QHS    lisinopriL  20 mg Oral Daily    NIFEdipine  60 mg Oral Daily    pravastatin  20 mg Oral Daily    triamterene-hydrochlorothiazide 37.5-25 mg  1 tablet Oral Daily     PRN Meds:acetaminophen, acetaminophen, bisacodyL, dextromethorphan-guaiFENesin  mg/5 ml, dextrose 10%, dextrose 10%, glucagon (human recombinant), hydrALAZINE, HYDROcodone-acetaminophen, insulin aspart U-100, morphine, morphine, ondansetron, simethicone, traZODone     Review of patient's allergies indicates:   Allergen Reactions    Marijuana/cannabinoid      seizure    Melon      watermellon     Objective:     Vital Signs (Most Recent):  Temp: 99.8 °F (37.7 °C) (11/21/23 1046)  Pulse: 100 (11/21/23 1046)  Resp: 20 (11/21/23 1046)  BP: 137/80 (map 103) (11/21/23 1046)  SpO2: 98 % (11/21/23 1046) Vital Signs (24h Range):  Temp:  [97.3 °F (36.3 °C)-99.8 °F (37.7 °C)] 99.8 °F (37.7 °C)  Pulse:  [] 100  Resp:  [16-20] 20  SpO2:  [96 %-98 %] 98 %  BP: (104-137)/(60-85) 137/80     Weight: 102.1 kg (225 lb 1.4 oz)  Body mass index is 35.25 kg/m².    Intake/Output - Last 3 Shifts         11/19 0700  11/20 0659 11/20 0700  11/21 0659 11/21 0700 11/22 0659    P.O. 240 1440     I.V. (mL/kg)  1570.8 (15.4)     Total Intake(mL/kg) 240 (2.4) 3010.8 (29.5)     Urine (mL/kg/hr)  1000 (0.4)     Stool  0     Total Output  1000     Net +240 +2010.8            Urine Occurrence 3 x      Stool Occurrence  0 x              Physical Exam  Vitals  and nursing note reviewed. Exam conducted with a chaperone present.   Constitutional:       Appearance: Normal appearance.   HENT:      Head: Atraumatic.      Mouth/Throat:      Mouth: Mucous membranes are moist.      Pharynx: Oropharynx is clear.   Eyes:      Conjunctiva/sclera: Conjunctivae normal.      Pupils: Pupils are equal, round, and reactive to light.   Cardiovascular:      Rate and Rhythm: Normal rate and regular rhythm.      Pulses: Normal pulses.      Heart sounds: Normal heart sounds.   Pulmonary:      Effort: Pulmonary effort is normal.      Breath sounds: Normal breath sounds.   Abdominal:      General: Abdomen is flat. Bowel sounds are normal. There is no distension.      Palpations: Abdomen is soft.      Tenderness: There is abdominal tenderness. There is no guarding or rebound.   Musculoskeletal:         General: No deformity or signs of injury. Normal range of motion.      Cervical back: Neck supple.      Right lower leg: No edema.      Left lower leg: No edema.   Skin:     General: Skin is warm and dry.      Coloration: Skin is not jaundiced or pale.      Findings: No bruising, lesion or rash.   Neurological:      General: No focal deficit present.      Mental Status: He is alert and oriented to person, place, and time.   Psychiatric:         Mood and Affect: Mood normal.          Significant Labs:  I have reviewed all pertinent lab results within the past 24 hours.    Significant Diagnostics:  I have reviewed all pertinent imaging results/findings within the past 24 hours.  Assessment/Plan:     * Malignant neoplasm of colon  11/21/23  S/p colon resection  Tolerating clears - advance to soft  + flatus        MICHELLE Lozoya  General Surgery  [facility]

## 2023-11-21 NOTE — NURSING
At 1610 pt's girlfriend is at the desk asking about discharge, there is no orders in at this time, I sent a secure chat to Kory ROSARIO NP to see if the pt is going to be discharged today and awaiting answer.

## 2023-11-21 NOTE — SUBJECTIVE & OBJECTIVE
Interval History: + flatus. W/o significant bm    Medications:  Continuous Infusions:   sodium chloride 0.45% 125 mL/hr at 11/20/23 2226     Scheduled Meds:   bisacodyL  10 mg Rectal Daily    cloNIDine  0.2 mg Oral QHS    enoxparin  40 mg Subcutaneous Daily    insulin detemir U-100  10 Units Subcutaneous QHS    lisinopriL  20 mg Oral Daily    NIFEdipine  60 mg Oral Daily    pravastatin  20 mg Oral Daily    triamterene-hydrochlorothiazide 37.5-25 mg  1 tablet Oral Daily     PRN Meds:acetaminophen, acetaminophen, bisacodyL, dextromethorphan-guaiFENesin  mg/5 ml, dextrose 10%, dextrose 10%, glucagon (human recombinant), hydrALAZINE, HYDROcodone-acetaminophen, insulin aspart U-100, morphine, morphine, ondansetron, simethicone, traZODone     Review of patient's allergies indicates:   Allergen Reactions    Marijuana/cannabinoid      seizure    Melon      watermellon     Objective:     Vital Signs (Most Recent):  Temp: 99.8 °F (37.7 °C) (11/21/23 1046)  Pulse: 100 (11/21/23 1046)  Resp: 20 (11/21/23 1046)  BP: 137/80 (map 103) (11/21/23 1046)  SpO2: 98 % (11/21/23 1046) Vital Signs (24h Range):  Temp:  [97.3 °F (36.3 °C)-99.8 °F (37.7 °C)] 99.8 °F (37.7 °C)  Pulse:  [] 100  Resp:  [16-20] 20  SpO2:  [96 %-98 %] 98 %  BP: (104-137)/(60-85) 137/80     Weight: 102.1 kg (225 lb 1.4 oz)  Body mass index is 35.25 kg/m².    Intake/Output - Last 3 Shifts         11/19 0700 11/20 0659 11/20 0700 11/21 0659 11/21 0700 11/22 0659    P.O. 240 1440     I.V. (mL/kg)  1570.8 (15.4)     Total Intake(mL/kg) 240 (2.4) 3010.8 (29.5)     Urine (mL/kg/hr)  1000 (0.4)     Stool  0     Total Output  1000     Net +240 +2010.8            Urine Occurrence 3 x      Stool Occurrence  0 x              Physical Exam  Vitals and nursing note reviewed. Exam conducted with a chaperone present.   Constitutional:       Appearance: Normal appearance.   HENT:      Head: Atraumatic.      Mouth/Throat:      Mouth: Mucous membranes are moist.       Pharynx: Oropharynx is clear.   Eyes:      Conjunctiva/sclera: Conjunctivae normal.      Pupils: Pupils are equal, round, and reactive to light.   Cardiovascular:      Rate and Rhythm: Normal rate and regular rhythm.      Pulses: Normal pulses.      Heart sounds: Normal heart sounds.   Pulmonary:      Effort: Pulmonary effort is normal.      Breath sounds: Normal breath sounds.   Abdominal:      General: Abdomen is flat. Bowel sounds are normal. There is no distension.      Palpations: Abdomen is soft.      Tenderness: There is abdominal tenderness. There is no guarding or rebound.   Musculoskeletal:         General: No deformity or signs of injury. Normal range of motion.      Cervical back: Neck supple.      Right lower leg: No edema.      Left lower leg: No edema.   Skin:     General: Skin is warm and dry.      Coloration: Skin is not jaundiced or pale.      Findings: No bruising, lesion or rash.   Neurological:      General: No focal deficit present.      Mental Status: He is alert and oriented to person, place, and time.   Psychiatric:         Mood and Affect: Mood normal.          Significant Labs:  I have reviewed all pertinent lab results within the past 24 hours.    Significant Diagnostics:  I have reviewed all pertinent imaging results/findings within the past 24 hours.

## 2023-11-21 NOTE — NURSING
At 1620 I called surgery through the  two times and no answer, then they tried Dr. Godwin's cell number and I spoke to him about the pt.

## 2023-11-21 NOTE — PLAN OF CARE
Chart reviewed. Cont poc. Pt with flatus but awaiting return of full bowel functioning. Diet advancing as tolerated. Ss following for dc needs and recommendations.

## 2023-11-21 NOTE — NURSING
At 1420 Pt's girlfriend is here at the desk asking if any orders are in for discharge, I checked orders and there is no new ones at this time, I checked secure chat and Dr. Godwin is listed as do not disturb, I called surgery and there was no answer, will try again later.

## 2023-11-22 ENCOUNTER — PATIENT OUTREACH (OUTPATIENT)
Dept: ADMINISTRATIVE | Facility: CLINIC | Age: 48
End: 2023-11-22

## 2023-11-22 NOTE — PROGRESS NOTES
C3 nurse attempted to contact [facility]  for a [facility] post hospital discharge follow up call. No answer. Left voicemail with callback information. The patient does not have a scheduled HOSFU appointment. Message sent to PCP staff for assistance with scheduling visit with patient.

## 2023-11-22 NOTE — HOSPITAL COURSE
48-year-old male who is met maximum benefit of hospitalization after undergoing uncomplicated right colon resection for removal of tumor with ileo colostomy creation.  At discharge patient tolerated oral intake and reports flatus.  Surgical pathology pending.  Patient is to follow up with Dr. Godwin as scheduled

## 2023-11-22 NOTE — NURSING
At 1810 I called Dr. Godwin's cellphone  to see if he could send the pt's meds to [facility] on [address], he said that he would.

## 2023-11-22 NOTE — PROGRESS NOTES
C3 nurse spoke with Russel Hill  for a TCC post hospital discharge follow up call. The patient has a scheduled HOSFU appointment with MICAELA Singh on 11/28/2023 @ 715.  Unable to scheduled with Charlotte Martinez due to no availability.   Patient agreed to see MICAELA Singh.  Appointment scheduled with Mary Lee.

## 2023-11-22 NOTE — PLAN OF CARE
[facility]  Discharge Final Note    Primary Care Provider: Charlotte Martinez FNP    Expected Discharge Date: 11/21/2023    Final Discharge Note (most recent)       Final Note - 11/22/23 0827          Final Note    Assessment Type Final Discharge Note     Anticipated Discharge Disposition Home or Self Care     What phone number can be called within the next 1-3 days to see how you are doing after discharge? [phone]        Post-Acute Status    Discharge Delays None known at this time                     Contact Boby Jolley MD   Specialty: General Surgery, Surgery    [address] [place] [id number]   Phone: [phone]       Next Steps: Schedule an appointment as soon as possible for a visit in 2 week(s)          Pt dc home with self care. 0 dc needs noted.

## 2023-11-28 ENCOUNTER — OFFICE VISIT (OUTPATIENT)
Dept: FAMILY MEDICINE | Facility: CLINIC | Age: 48
End: 2023-11-28
Payer: COMMERCIAL

## 2023-11-28 VITALS
SYSTOLIC BLOOD PRESSURE: 134 MMHG | TEMPERATURE: 98 F | BODY MASS INDEX: 37.04 KG/M2 | DIASTOLIC BLOOD PRESSURE: 81 MMHG | HEART RATE: 80 BPM | OXYGEN SATURATION: 98 % | WEIGHT: 236 LBS | HEIGHT: 67 IN | RESPIRATION RATE: 20 BRPM

## 2023-11-28 DIAGNOSIS — E11.9 TYPE 2 DIABETES MELLITUS WITHOUT COMPLICATION, WITHOUT LONG-TERM CURRENT USE OF INSULIN: Chronic | ICD-10-CM

## 2023-11-28 DIAGNOSIS — C18.9 MALIGNANT NEOPLASM OF COLON, UNSPECIFIED PART OF COLON: Primary | ICD-10-CM

## 2023-11-28 DIAGNOSIS — I10 PRIMARY HYPERTENSION: Chronic | ICD-10-CM

## 2023-11-28 LAB
CREAT UR-MCNC: 116 MG/DL (ref 39–259)
MICROALBUMIN UR-MCNC: 1.2 MG/DL (ref 0–2.8)
MICROALBUMIN/CREAT RATIO PNL UR: 10.3 MG/G (ref 0–30)

## 2023-11-28 PROCEDURE — 2023F PR DILATED RETINAL EXAM W/O EVID OF RETINOPATHY: ICD-10-PCS | Mod: CPTII,,, | Performed by: NURSE PRACTITIONER

## 2023-11-28 PROCEDURE — 3044F PR MOST RECENT HEMOGLOBIN A1C LEVEL <7.0%: ICD-10-PCS | Mod: CPTII,,, | Performed by: NURSE PRACTITIONER

## 2023-11-28 PROCEDURE — 3044F HG A1C LEVEL LT 7.0%: CPT | Mod: CPTII,,, | Performed by: NURSE PRACTITIONER

## 2023-11-28 PROCEDURE — 1160F PR REVIEW ALL MEDS BY PRESCRIBER/CLIN PHARMACIST DOCUMENTED: ICD-10-PCS | Mod: CPTII,,, | Performed by: NURSE PRACTITIONER

## 2023-11-28 PROCEDURE — 1159F PR MEDICATION LIST DOCUMENTED IN MEDICAL RECORD: ICD-10-PCS | Mod: CPTII,,, | Performed by: NURSE PRACTITIONER

## 2023-11-28 PROCEDURE — 4010F PR ACE/ARB THEARPY RXD/TAKEN: ICD-10-PCS | Mod: CPTII,,, | Performed by: NURSE PRACTITIONER

## 2023-11-28 PROCEDURE — 99495 TRANSJ CARE MGMT MOD F2F 14D: CPT | Mod: ,,, | Performed by: NURSE PRACTITIONER

## 2023-11-28 PROCEDURE — 1159F MED LIST DOCD IN RCRD: CPT | Mod: CPTII,,, | Performed by: NURSE PRACTITIONER

## 2023-11-28 PROCEDURE — 3075F SYST BP GE 130 - 139MM HG: CPT | Mod: CPTII,,, | Performed by: NURSE PRACTITIONER

## 2023-11-28 PROCEDURE — 3079F DIAST BP 80-89 MM HG: CPT | Mod: CPTII,,, | Performed by: NURSE PRACTITIONER

## 2023-11-28 PROCEDURE — 82043 UR ALBUMIN QUANTITATIVE: CPT | Mod: ,,, | Performed by: CLINICAL MEDICAL LABORATORY

## 2023-11-28 PROCEDURE — 82570 ASSAY OF URINE CREATININE: CPT | Mod: ,,, | Performed by: CLINICAL MEDICAL LABORATORY

## 2023-11-28 PROCEDURE — 3079F PR MOST RECENT DIASTOLIC BLOOD PRESSURE 80-89 MM HG: ICD-10-PCS | Mod: CPTII,,, | Performed by: NURSE PRACTITIONER

## 2023-11-28 PROCEDURE — 1111F PR DISCHARGE MEDS RECONCILED W/ CURRENT OUTPATIENT MED LIST: ICD-10-PCS | Mod: CPTII,,, | Performed by: NURSE PRACTITIONER

## 2023-11-28 PROCEDURE — 3075F PR MOST RECENT SYSTOLIC BLOOD PRESS GE 130-139MM HG: ICD-10-PCS | Mod: CPTII,,, | Performed by: NURSE PRACTITIONER

## 2023-11-28 PROCEDURE — 4010F ACE/ARB THERAPY RXD/TAKEN: CPT | Mod: CPTII,,, | Performed by: NURSE PRACTITIONER

## 2023-11-28 PROCEDURE — 99495 TCM SERVICES (MODERATE COMPLEXITY): ICD-10-PCS | Mod: ,,, | Performed by: NURSE PRACTITIONER

## 2023-11-28 PROCEDURE — 1160F RVW MEDS BY RX/DR IN RCRD: CPT | Mod: CPTII,,, | Performed by: NURSE PRACTITIONER

## 2023-11-28 PROCEDURE — 82043 MICROALBUMIN / CREATININE RATIO URINE: ICD-10-PCS | Mod: ,,, | Performed by: CLINICAL MEDICAL LABORATORY

## 2023-11-28 PROCEDURE — 1111F DSCHRG MED/CURRENT MED MERGE: CPT | Mod: CPTII,,, | Performed by: NURSE PRACTITIONER

## 2023-11-28 PROCEDURE — 2023F DILAT RTA XM W/O RTNOPTHY: CPT | Mod: CPTII,,, | Performed by: NURSE PRACTITIONER

## 2023-11-28 PROCEDURE — 82570 MICROALBUMIN / CREATININE RATIO URINE: ICD-10-PCS | Mod: ,,, | Performed by: CLINICAL MEDICAL LABORATORY

## 2023-11-28 RX ORDER — TERBINAFINE HYDROCHLORIDE 250 MG/1
250 TABLET ORAL DAILY
COMMUNITY
Start: 2023-09-12

## 2023-11-28 NOTE — PROGRESS NOTES
[facility] FAMILY [facility]       PATIENT NAME: Russel Hill   : 1975    AGE: 48 y.o. DATE OF ENCOUNTER: 23    MRN: [id number]      PCP: Charlotte Martinez FNP    Reason for Visit / Chief Complaint:  TCC Call (Patient presents to clinic for hospital follow up of colon cancer. )         274}    Subjective:     HPI:    Russel Hill presents for a Transitional Care Management hospital discharge follow-up visit. He was admitted to [facility] hospital on 23 for surgical removal of tumor per Dr. Boby Godwin for malignant neoplasm of colon noted on screening colonoscopy 10/19/2023 and was discharged from hosp on 23. Doing okay aside from post-op pain.  Did not require colostomy.  He has postop f/u with Dr. Boby dailey 2023.  Has been taking 2 norco 2 times per day and it helps but is asking about something stronger for pain.  Advised he will need to talk to his surgeon about pain med.  taking dulcolax prn, last BM 23     Reports as of now has no appt with Oncologist and no plans for radiation or chemo.    T2DM - has been off Ozempic for approx 6 wks preparing for surgery.  Reports polyphagia & has gained weight.    Family and/or Caretaker present at visit?  No.  Diagnostic tests reviewed/disposition: No diagnosic tests pending after this hospitalization.  Home health/community services discussion/referrals: Patient does not have home health established from hospital visit.  They do not need home health.  If needed, we will set up home health for the patient.   Establishment or re-establishment of referral orders for community resources: No other necessary community resources.   Discussion with other health care providers: No discussion with other health care providers necessary.     Discharge and current medications have been reconciled.    Review of Systems:   Review of Systems   Constitutional: Negative.    HENT: Negative.     Respiratory:  Negative.     Cardiovascular: Negative.    Gastrointestinal:  Positive for abdominal pain (post-surgical pain) and constipation. Negative for blood in stool, nausea and vomiting.   Genitourinary: Negative.    Skin: Negative.    Neurological: Negative.    Psychiatric/Behavioral: Negative.         Allergies and Meds: 274}     Review of patient's allergies indicates:   Allergen Reactions    Marijuana/cannabinoid      seizure    Melon      watermellon        Current Outpatient Medications:     bisacodyL (DULCOLAX) 5 mg EC tablet, Take 2 tablets (10 mg total) by mouth daily as needed for Constipation., Disp: 10 tablet, Rfl: 0    cloNIDine (CATAPRES) 0.2 MG tablet, Take 1 tablet (0.2 mg total) by mouth every evening., Disp: 90 tablet, Rfl: 3    HYDROcodone-acetaminophen (NORCO) 7.5-325 mg per tablet, Take 1 tablet by mouth every 6 (six) hours as needed for Pain., Disp: 24 tablet, Rfl: 0    lisinopriL (PRINIVIL,ZESTRIL) 20 MG tablet, Take 1 tablet (20 mg total) by mouth once daily., Disp: 90 tablet, Rfl: 3    metFORMIN (GLUCOPHAGE) 500 MG tablet, Take 1 tablet (500 mg total) by mouth daily with breakfast., Disp: 90 tablet, Rfl: 3    NIFEdipine (ADALAT CC) 60 MG TbSR, Take 1 tablet (60 mg total) by mouth once daily., Disp: 90 tablet, Rfl: 3    potassium chloride SA (K-DUR,KLOR-CON) 20 MEQ tablet, Take 1 tablet (20 mEq total) by mouth once daily., Disp: 90 tablet, Rfl: 3    pravastatin (PRAVACHOL) 20 MG tablet, Take 1 tablet (20 mg total) by mouth once daily., Disp: 90 tablet, Rfl: 3    terbinafine HCL (LAMISIL) 250 mg tablet, Take 250 mg by mouth once daily., Disp: , Rfl:     triamterene-hydrochlorothiazide 37.5-25 mg (DYAZIDE) 37.5-25 mg per capsule, Take 1 capsule by mouth once daily., Disp: 90 capsule, Rfl: 3    HYDROcodone-acetaminophen (NORCO) 7.5-325 mg per tablet, Take 1 tablet by mouth every 8 (eight) hours as needed for Pain., Disp: 14 tablet, Rfl: 0    semaglutide (OZEMPIC) 1 mg/dose (4 mg/3 mL), Inject 1 mg into  the skin every 7 days. (Patient not taking: Reported on 11/22/2023), Disp: 9 mL, Rfl: 3    Labs:274}    I have reviewed old labs below:  Lab Results   Component Value Date    WBC 9.87 11/18/2023    RBC 5.21 11/18/2023    HGB 13.6 11/18/2023    HCT 42.4 11/18/2023     11/18/2023     11/20/2023    K 3.5 11/20/2023    CL 99 11/20/2023    CALCIUM 9.3 11/20/2023     (H) 11/20/2023    BUN 12 11/20/2023    CREATININE 1.00 11/20/2023    ESTGFRAFRICA 113 05/12/2021    EGFRNONAA 69 06/16/2022    ALT 30 11/20/2023    AST 17 11/20/2023    CHOL 148 06/19/2023    TRIG 71 06/19/2023    HDL 76 (H) 06/19/2023    LDLCALC 58 06/19/2023    TSH 0.906 01/04/2022    PSA 0.437 06/19/2023    HGBA1C 5.9 11/16/2023    MICROALBUR 1.0 12/20/2022       Medical History: 274}     Past Medical History:   Diagnosis Date    Erectile dysfunction     Hypercholesteremia 03/04/2016    Primary hypertension     Type 2 diabetes mellitus without complication, without long-term current use of insulin       Social History     Tobacco Use   Smoking Status Never   Smokeless Tobacco Never      Past Surgical History:   Procedure Laterality Date    CIRCUMCISION  1996    COLON RESECTION Right 11/16/2023    Procedure: COLON RESECTION;  Surgeon: Boby Godwin MD;  Location: [facility];  Service: General;  Laterality: Right;        Health Maintenance: 274}     Health Maintenance         Date Due Completion Date    HIV Screening Never done ---    TETANUS VACCINE Never done ---    Diabetes Urine Screening 12/20/2023 12/20/2022    Hemoglobin A1c 05/16/2024 11/16/2023    Lipid Panel 06/19/2024 6/19/2023    Eye Exam 07/19/2024 7/19/2023    Override on 3/22/2022: Done ([facility])    Colorectal Cancer Screening 10/19/2024 10/19/2023    Low Dose Statin 11/28/2024 11/28/2023    Foot Exam 11/28/2024 11/28/2023            Objective:  274}   /81 (BP Location: Left arm, Patient Position: Sitting, BP Method: Large (Manual))   Pulse 80   Temp 98.1  "°F (36.7 °C) (Oral)   Resp 20   Ht 5' 7" (1.702 m)   Wt 107 kg (236 lb)   SpO2 98%   BMI 36.96 kg/m²     Wt Readings from Last 3 Encounters:   11/28/23 107 kg (236 lb)   11/16/23 102.1 kg (225 lb 1.4 oz)   10/19/23 102.5 kg (226 lb)     BP Readings from Last 3 Encounters:   11/28/23 134/81   11/21/23 120/70   10/19/23 (!) 92/48     Body mass index is 36.96 kg/m².     Physical Exam  Vitals and nursing note reviewed.   Constitutional:       Appearance: Normal appearance.   HENT:      Head: Normocephalic.   Eyes:      Conjunctiva/sclera: Conjunctivae normal.      Pupils: Pupils are equal, round, and reactive to light.   Neck:      Thyroid: No thyromegaly.      Vascular: No carotid bruit.      Trachea: Trachea normal.   Cardiovascular:      Rate and Rhythm: Normal rate and regular rhythm.      Pulses:           Dorsalis pedis pulses are 3+ on the right side and 3+ on the left side.        Posterior tibial pulses are 3+ on the right side and 3+ on the left side.      Heart sounds: Normal heart sounds.   Pulmonary:      Effort: Pulmonary effort is normal.      Breath sounds: Normal breath sounds.   Musculoskeletal:      Cervical back: Neck supple.      Right lower leg: No edema.      Left lower leg: No edema.      Right foot: Normal range of motion. No deformity or bunion.      Left foot: Normal range of motion. No deformity or bunion.   Feet:      Right foot:      Protective Sensation: 6 sites tested.  6 sites sensed.      Skin integrity: Dry skin present. No ulcer, blister, erythema, warmth, callus or fissure.      Toenail Condition: Right toenails are abnormally thick. Fungal disease present.     Left foot:      Protective Sensation: 6 sites tested.  6 sites sensed.      Skin integrity: Dry skin present. No ulcer, blister, erythema, warmth, callus or fissure.      Toenail Condition: Left toenails are abnormally thick. Fungal disease present.  Lymphadenopathy:      Cervical: No cervical adenopathy.      Upper Body: "      Right upper body: No supraclavicular adenopathy.      Left upper body: No supraclavicular adenopathy.   Skin:     General: Skin is warm and dry.      Findings: No rash.   Neurological:      General: No focal deficit present.      Mental Status: He is alert and oriented to person, place, and time.   Psychiatric:         Mood and Affect: Mood normal.         Behavior: Behavior normal.          Assessment and Plan: 274}     1. Malignant neoplasm of colon, unspecified part of colon  Comments:  s/p removal of tumor 11/16/2023 per Dr. Boby Godwin    2. Primary hypertension  Comments:  Controlled, continue lisinopril, nifedipine, triamterene hydrochlorothiazide, and clonidine.    3. Type 2 diabetes mellitus without complication, without long-term current use of insulin  Comments:  Controlled  Continue metformin and resume Ozempic with titration as advised dosage.  Orders:  -     Microalbumin/Creatinine Ratio, Urine; Future; Expected date: 11/28/2023       Sample of Ozempic to restart titration dosage.    Just had A1c 11/16/2023 so will move f/u visit 01/02/2024 to around 5/16/24, and f/u sooner as needed.    Future Appointments   Date Time Provider Department Center   12/5/2023 10:00 AM Boby Godwin MD [facility]   5/20/2024  9:00 AM Charlotte Martinez FNP [facility]   6/20/2024  9:00 AM Charlotte Martinez FNP [facility]        Signature:  MICAELA Lowry

## 2023-12-05 ENCOUNTER — OFFICE VISIT (OUTPATIENT)
Dept: SURGERY | Facility: CLINIC | Age: 48
End: 2023-12-05
Attending: SURGERY
Payer: COMMERCIAL

## 2023-12-05 DIAGNOSIS — C18.2 MALIGNANT NEOPLASM OF ASCENDING COLON: Primary | ICD-10-CM

## 2023-12-05 DIAGNOSIS — Z09 POSTOP CHECK: ICD-10-CM

## 2023-12-05 PROCEDURE — 1159F PR MEDICATION LIST DOCUMENTED IN MEDICAL RECORD: ICD-10-PCS | Mod: CPTII,,, | Performed by: SURGERY

## 2023-12-05 PROCEDURE — 99024 PR POST-OP FOLLOW-UP VISIT: ICD-10-PCS | Mod: ,,, | Performed by: SURGERY

## 2023-12-05 PROCEDURE — 99024 POSTOP FOLLOW-UP VISIT: CPT | Mod: ,,, | Performed by: SURGERY

## 2023-12-05 PROCEDURE — 4010F ACE/ARB THERAPY RXD/TAKEN: CPT | Mod: CPTII,,, | Performed by: SURGERY

## 2023-12-05 PROCEDURE — 1159F MED LIST DOCD IN RCRD: CPT | Mod: CPTII,,, | Performed by: SURGERY

## 2023-12-05 PROCEDURE — 4010F PR ACE/ARB THEARPY RXD/TAKEN: ICD-10-PCS | Mod: CPTII,,, | Performed by: SURGERY

## 2023-12-05 PROCEDURE — 3044F HG A1C LEVEL LT 7.0%: CPT | Mod: CPTII,,, | Performed by: SURGERY

## 2023-12-05 PROCEDURE — 99213 OFFICE O/P EST LOW 20 MIN: CPT | Mod: PBBFAC | Performed by: SURGERY

## 2023-12-05 PROCEDURE — 3066F NEPHROPATHY DOC TX: CPT | Mod: CPTII,,, | Performed by: SURGERY

## 2023-12-05 PROCEDURE — 3061F NEG MICROALBUMINURIA REV: CPT | Mod: CPTII,,, | Performed by: SURGERY

## 2023-12-05 PROCEDURE — 3061F PR NEG MICROALBUMINURIA RESULT DOCUMENTED/REVIEW: ICD-10-PCS | Mod: CPTII,,, | Performed by: SURGERY

## 2023-12-05 PROCEDURE — 3044F PR MOST RECENT HEMOGLOBIN A1C LEVEL <7.0%: ICD-10-PCS | Mod: CPTII,,, | Performed by: SURGERY

## 2023-12-05 PROCEDURE — 3066F PR DOCUMENTATION OF TREATMENT FOR NEPHROPATHY: ICD-10-PCS | Mod: CPTII,,, | Performed by: SURGERY

## 2023-12-05 RX ORDER — HYDROCODONE BITARTRATE AND ACETAMINOPHEN 7.5; 325 MG/1; MG/1
1 TABLET ORAL EVERY 6 HOURS PRN
Qty: 12 TABLET | Refills: 0 | Status: SHIPPED | OUTPATIENT
Start: 2023-12-05 | End: 2024-03-14 | Stop reason: ALTCHOICE

## 2023-12-06 NOTE — PROGRESS NOTES
Subjective:       Patient ID: Russel Hill is a 48 y.o. male.    Chief Complaint: Post-op Evaluation    S/p colon resection  Path shows well differentiated adenocarcinoma with 14 negative nodes.  C/o incisional pain        family history includes Cancer in his maternal grandfather, maternal grandmother, mother, paternal grandfather, and paternal grandmother; Diabetes in his brother, father, maternal grandfather, maternal grandmother, mother, paternal grandfather, and paternal grandmother; Heart disease in his brother; Heart failure in his brother and brother; Hypertension in his brother, brother, father, maternal grandfather, maternal grandmother, mother, paternal grandfather, paternal grandmother, and sister; Kidney failure in his brother; No Known Problems in his daughter, daughter, daughter, daughter, son, and son.  Past Medical History:   Diagnosis Date    Erectile dysfunction     Hypercholesteremia 03/04/2016    Primary hypertension     Type 2 diabetes mellitus without complication, without long-term current use of insulin       Past Surgical History:   Procedure Laterality Date    CIRCUMCISION  1996    COLON RESECTION Right 11/16/2023    Procedure: COLON RESECTION;  Surgeon: Boby Godwin MD;  Location: [facility];  Service: General;  Laterality: Right;       reports that he has never smoked. He has never used smokeless tobacco. He reports current alcohol use. He reports that he does not currently use drugs.     Review of Systems   All other systems reviewed and are negative.         Objective:      There were no vitals taken for this visit.   Physical Exam  Cardiovascular:      Pulses: Normal pulses.   Pulmonary:      Effort: Pulmonary effort is normal.   Skin:     Comments: Incision well healed   Neurological:      Mental Status: He is alert.           Assessment/Plan:         Malignant neoplasm of ascending colon  -     HYDROcodone-acetaminophen (NORCO) 7.5-325 mg per tablet; Take 1 tablet by  mouth every 6 (six) hours as needed for Pain.  Dispense: 12 tablet; Refill: 0    Postop check         Problem List Items Addressed This Visit          Oncology    Malignant neoplasm of colon - Primary    Relevant Medications    HYDROcodone-acetaminophen (NORCO) 7.5-325 mg per tablet       Other    Postop check    Current Assessment & Plan     Doing ok  F/u in 3 months to ensure patient scheduled for C scope in 2 years.

## 2023-12-06 NOTE — ASSESSMENT & PLAN NOTE
Doing ok  Refill pain medications, Lortab 7.5mg, #12.  F/u in 3 months to ensure patient scheduled for C scope in 2 years.

## 2023-12-28 ENCOUNTER — TELEPHONE (OUTPATIENT)
Dept: SURGERY | Facility: CLINIC | Age: 48
End: 2023-12-28

## 2023-12-28 NOTE — TELEPHONE ENCOUNTER
Phone call from patient needing a release to work. Note for patient to go back on 1/8/24 with no restrictions.Patient will  today.

## 2023-12-28 NOTE — LETTER
December 28, 2023      [facility]  [address]  [facility] [address]  Phone: [phone]  Fax: [phone]       Patient: Russel Hill   YOB: 1975  Date of Visit: 12/28/2023    To Whom It May Concern:    Asher Hill  was at [facility] on 11/16/23. The patient may return to work on 1/8/24 with no restrictions. If you have any questions or concerns, or if I can be of further assistance, please do not hesitate to contact me.    Sincerely,      Boby Godwin M.D.

## 2023-12-28 NOTE — TELEPHONE ENCOUNTER
----- Message from Carole Cohn sent at 12/27/2023 11:07 AM CST -----  atient had surgery on Nov 16 he had colon cancer he was wanted to know when will he be able to return to work call back number [phone]

## 2024-01-07 ENCOUNTER — OFFICE VISIT (OUTPATIENT)
Dept: FAMILY MEDICINE | Facility: CLINIC | Age: 49
End: 2024-01-07
Payer: COMMERCIAL

## 2024-01-07 VITALS
SYSTOLIC BLOOD PRESSURE: 133 MMHG | DIASTOLIC BLOOD PRESSURE: 78 MMHG | OXYGEN SATURATION: 96 % | HEART RATE: 106 BPM | WEIGHT: 245 LBS | HEIGHT: 67 IN | TEMPERATURE: 100 F | BODY MASS INDEX: 38.45 KG/M2

## 2024-01-07 DIAGNOSIS — J11.1 INFLUENZA: Primary | ICD-10-CM

## 2024-01-07 DIAGNOSIS — Z20.828 EXPOSURE TO VIRAL DISEASE: ICD-10-CM

## 2024-01-07 LAB
CTP QC/QA: YES
CTP QC/QA: YES
FLUAV AG NPH QL: NEGATIVE
FLUBV AG NPH QL: POSITIVE
SARS-COV-2 AG RESP QL IA.RAPID: NEGATIVE

## 2024-01-07 PROCEDURE — 87804 INFLUENZA ASSAY W/OPTIC: CPT | Mod: 59,QW,, | Performed by: FAMILY MEDICINE

## 2024-01-07 PROCEDURE — 3078F DIAST BP <80 MM HG: CPT | Mod: CPTII,,, | Performed by: FAMILY MEDICINE

## 2024-01-07 PROCEDURE — 87426 SARSCOV CORONAVIRUS AG IA: CPT | Mod: QW,,, | Performed by: FAMILY MEDICINE

## 2024-01-07 PROCEDURE — 3008F BODY MASS INDEX DOCD: CPT | Mod: CPTII,,, | Performed by: FAMILY MEDICINE

## 2024-01-07 PROCEDURE — 99051 MED SERV EVE/WKEND/HOLIDAY: CPT | Mod: ,,, | Performed by: FAMILY MEDICINE

## 2024-01-07 PROCEDURE — 99214 OFFICE O/P EST MOD 30 MIN: CPT | Mod: ,,, | Performed by: FAMILY MEDICINE

## 2024-01-07 PROCEDURE — 1159F MED LIST DOCD IN RCRD: CPT | Mod: CPTII,,, | Performed by: FAMILY MEDICINE

## 2024-01-07 PROCEDURE — 3075F SYST BP GE 130 - 139MM HG: CPT | Mod: CPTII,,, | Performed by: FAMILY MEDICINE

## 2024-01-07 PROCEDURE — 1160F RVW MEDS BY RX/DR IN RCRD: CPT | Mod: CPTII,,, | Performed by: FAMILY MEDICINE

## 2024-01-07 RX ORDER — OSELTAMIVIR PHOSPHATE 75 MG/1
75 CAPSULE ORAL 2 TIMES DAILY
Qty: 10 CAPSULE | Refills: 0 | Status: SHIPPED | OUTPATIENT
Start: 2024-01-07 | End: 2024-01-12

## 2024-01-07 NOTE — LETTER
January 7, 2024      [facility]  [address] [facility] [place] [address]  Phone: [phone]  Fax: [phone]       Patient: Russel Hill   YOB: 1975  Date of Visit: 01/07/2024    To Whom It May Concern:    Asher Hill  was at [facility] on 01/07/2024. The patient may return to work/school on 1/10/24 with no restrictions. If you have any questions or concerns, or if I can be of further assistance, please do not hesitate to contact me.    Sincerely,    Dennis Holbrook, CMA

## 2024-01-08 NOTE — PROGRESS NOTES
Subjective:       Patient ID: Russel Hill is a 48 y.o. male.    Chief Complaint: Fever and Headache    Fever   Associated symptoms include congestion, coughing and headaches. Pertinent negatives include no abdominal pain, chest pain, diarrhea, ear pain, nausea, rash, sore throat, urinary pain, vomiting or wheezing.   Headache   Associated symptoms include coughing, a fever, rhinorrhea and sinus pressure. Pertinent negatives include no abdominal pain, back pain, dizziness, ear pain, eye pain, eye redness, hearing loss, nausea, neck pain, numbness, photophobia, seizures, sore throat, tinnitus, vomiting or weakness.     Review of Systems   Constitutional:  Positive for fever. Negative for activity change, appetite change, chills, diaphoresis, fatigue and unexpected weight change.   HENT:  Positive for nasal congestion, postnasal drip, rhinorrhea and sinus pressure/congestion. Negative for dental problem, drooling, ear discharge, ear pain, facial swelling, hearing loss, mouth sores, nosebleeds, sneezing, sore throat, tinnitus, trouble swallowing, voice change and goiter.    Eyes:  Negative for photophobia, pain, discharge, redness, itching and visual disturbance.   Respiratory:  Positive for cough. Negative for apnea, choking, chest tightness, shortness of breath, wheezing and stridor.    Cardiovascular:  Negative for chest pain, palpitations, leg swelling and claudication.   Gastrointestinal:  Negative for abdominal distention, abdominal pain, anal bleeding, blood in stool, change in bowel habit, constipation, diarrhea, nausea, vomiting, reflux and fecal incontinence.   Endocrine: Negative for cold intolerance, heat intolerance, polydipsia, polyphagia and polyuria.   Genitourinary:  Negative for bladder incontinence, decreased urine volume, difficulty urinating, discharge, dysuria, enuresis, erectile dysfunction, flank pain, frequency, genital sores, hematuria, penile pain, testicular pain and urgency.    Musculoskeletal:  Negative for arthralgias, back pain, gait problem, joint swelling, leg pain, myalgias, neck pain, neck stiffness and joint deformity.   Integumentary:  Negative for pallor, rash, wound and mole/lesion.   Allergic/Immunologic: Negative for environmental allergies, food allergies and frequent infections.   Neurological:  Positive for headaches. Negative for dizziness, vertigo, tremors, seizures, syncope, facial asymmetry, speech difficulty, weakness, light-headedness, numbness, memory loss and coordination difficulties.   Hematological:  Negative for adenopathy. Does not bruise/bleed easily.   Psychiatric/Behavioral:  Negative for agitation, behavioral problems, confusion, decreased concentration, dysphoric mood, hallucinations, self-injury, sleep disturbance and suicidal ideas. The patient is not nervous/anxious and is not hyperactive.          Objective:      Physical Exam  Vitals reviewed.   Constitutional:       Appearance: Normal appearance. He is normal weight.   HENT:      Head: Normocephalic and atraumatic.      Right Ear: Tympanic membrane and ear canal normal.      Left Ear: Tympanic membrane, ear canal and external ear normal.      Nose: Congestion and rhinorrhea present.      Mouth/Throat:      Mouth: Mucous membranes are moist.      Pharynx: Oropharynx is clear. Posterior oropharyngeal erythema present.   Eyes:      Extraocular Movements: Extraocular movements intact.      Conjunctiva/sclera: Conjunctivae normal.      Pupils: Pupils are equal, round, and reactive to light.   Cardiovascular:      Rate and Rhythm: Normal rate and regular rhythm.      Pulses: Normal pulses.      Heart sounds: Normal heart sounds.   Pulmonary:      Effort: Pulmonary effort is normal.      Breath sounds: Normal breath sounds.   Abdominal:      General: Abdomen is flat. Bowel sounds are normal.      Palpations: Abdomen is soft.   Musculoskeletal:         General: Normal range of motion.      Cervical back:  Normal range of motion and neck supple.   Skin:     General: Skin is warm and dry.   Neurological:      General: No focal deficit present.      Mental Status: He is alert and oriented to person, place, and time. Mental status is at baseline.   Psychiatric:         Mood and Affect: Mood normal.         Behavior: Behavior normal.         Thought Content: Thought content normal.         Judgment: Judgment normal.         Assessment:       1. Influenza    2. Exposure to viral disease        Plan:     Influenza  -     brompheniramin-phenylephrin-DM (RYNEX DM) 1-2.5-5 mg/5 mL Soln; Take 5 mLs by mouth every 4 (four) hours as needed (cough).  Dispense: 118 mL; Refill: 0  -     oseltamivir (TAMIFLU) 75 MG capsule; Take 1 capsule (75 mg total) by mouth 2 (two) times daily. for 5 days  Dispense: 10 capsule; Refill: 0    Exposure to viral disease  -     POCT Influenza A/B Rapid Antigen  -     SARS Coronavirus 2 Antigen, POCT

## 2024-01-10 ENCOUNTER — OFFICE VISIT (OUTPATIENT)
Dept: SURGERY | Facility: CLINIC | Age: 49
End: 2024-01-10
Attending: SURGERY
Payer: COMMERCIAL

## 2024-01-10 DIAGNOSIS — Z09 POSTOP CHECK: Primary | ICD-10-CM

## 2024-01-10 PROCEDURE — 1159F MED LIST DOCD IN RCRD: CPT | Mod: CPTII,,, | Performed by: SURGERY

## 2024-01-10 PROCEDURE — 99213 OFFICE O/P EST LOW 20 MIN: CPT | Mod: PBBFAC | Performed by: SURGERY

## 2024-01-10 PROCEDURE — 99024 POSTOP FOLLOW-UP VISIT: CPT | Mod: ,,, | Performed by: SURGERY

## 2024-01-10 NOTE — LETTER
January 10, 2024      [facility]  [address]  [facility] [address]  Phone: [phone]  Fax: [phone]       Patient: Russel Hill   YOB: 1975  Date of Visit: 01/10/2024    To Whom It May Concern:    Asher Hill  was at [facility] on 01/10/2024. The patient may return to work on 1/15/24  with no restrictions. If you have any questions or concerns, or if I can be of further assistance, please do not hesitate to contact me.    Sincerely,    Boby Godwin M.D.

## 2024-01-10 NOTE — PROGRESS NOTES
Patient had a colon resection November.  He states that he was doing well until recently when started taken Tylenol for high fever.  He states that the Tylenol caused him to have pain in his abdomen, and he was worried that the Tylenol was eroding the lining of his stomach.  I explained to him that that might happen with NSAIDs, but not with Tylenol.  On exam, there was no evidence of hernia.  His wound has healed nicely.    Follow-up with me PRN for further problems.    Work release was given to the patient today.

## 2024-03-11 PROBLEM — Z09 POSTOP CHECK: Status: RESOLVED | Noted: 2023-12-05 | Resolved: 2024-03-11

## 2024-03-13 ENCOUNTER — OFFICE VISIT (OUTPATIENT)
Dept: SURGERY | Facility: CLINIC | Age: 49
End: 2024-03-13
Attending: SURGERY
Payer: COMMERCIAL

## 2024-03-13 VITALS
HEART RATE: 64 BPM | SYSTOLIC BLOOD PRESSURE: 174 MMHG | DIASTOLIC BLOOD PRESSURE: 68 MMHG | WEIGHT: 213 LBS | TEMPERATURE: 99 F | RESPIRATION RATE: 18 BRPM | OXYGEN SATURATION: 100 % | BODY MASS INDEX: 34.23 KG/M2 | HEIGHT: 66 IN

## 2024-03-13 DIAGNOSIS — C18.2 MALIGNANT NEOPLASM OF ASCENDING COLON: ICD-10-CM

## 2024-03-13 DIAGNOSIS — R39.198 ABNORMAL URINATION: ICD-10-CM

## 2024-03-13 DIAGNOSIS — K59.00 CONSTIPATION, UNSPECIFIED CONSTIPATION TYPE: Primary | ICD-10-CM

## 2024-03-13 PROCEDURE — 3077F SYST BP >= 140 MM HG: CPT | Mod: CPTII,,, | Performed by: SURGERY

## 2024-03-13 PROCEDURE — 4010F ACE/ARB THERAPY RXD/TAKEN: CPT | Mod: CPTII,,, | Performed by: SURGERY

## 2024-03-13 PROCEDURE — 99213 OFFICE O/P EST LOW 20 MIN: CPT | Mod: S$PBB,,, | Performed by: SURGERY

## 2024-03-13 PROCEDURE — 3008F BODY MASS INDEX DOCD: CPT | Mod: CPTII,,, | Performed by: SURGERY

## 2024-03-13 PROCEDURE — 3078F DIAST BP <80 MM HG: CPT | Mod: CPTII,,, | Performed by: SURGERY

## 2024-03-13 PROCEDURE — 99215 OFFICE O/P EST HI 40 MIN: CPT | Mod: PBBFAC | Performed by: SURGERY

## 2024-03-13 NOTE — LETTER
March 13, 2024      [facility]  [address]  [place] [address]  Phone: [phone]  Fax: [phone]       Patient: Russel Hill   YOB: 1975  Date of Visit: 03/13/2024    To Whom It May Concern:    Asher Hill  was at [facility] on 03/13/2024. The patient may return to work on 3/14/24 with no restrictions. If you have any questions or concerns, or if I can be of further assistance, please do not hesitate to contact me.    Sincerely,    MD Ora Oh LPN

## 2024-03-13 NOTE — LETTER
March 13, 2024      [facility]  [address]  [place] [address]  Phone: [phone]  Fax: [phone]       Patient: Russel Hill   YOB: 1975  Date of Visit: 03/13/2024    To Whom It May Concern:    Asher Hill  was at [facility] on 03/13/2024. The patient may return to work on 3/13/24 with no restrictions. If you have any questions or concerns, or if I can be of further assistance, please do not hesitate to contact me.    Sincerely,    MD Ora Oh LPN

## 2024-03-14 ENCOUNTER — OFFICE VISIT (OUTPATIENT)
Dept: FAMILY MEDICINE | Facility: CLINIC | Age: 49
End: 2024-03-14
Payer: COMMERCIAL

## 2024-03-14 ENCOUNTER — HOSPITAL ENCOUNTER (OUTPATIENT)
Dept: RADIOLOGY | Facility: HOSPITAL | Age: 49
Discharge: HOME | End: 2024-03-14
Attending: NURSE PRACTITIONER
Payer: COMMERCIAL

## 2024-03-14 VITALS
HEIGHT: 66 IN | OXYGEN SATURATION: 98 % | DIASTOLIC BLOOD PRESSURE: 87 MMHG | BODY MASS INDEX: 38.73 KG/M2 | HEART RATE: 83 BPM | TEMPERATURE: 99 F | SYSTOLIC BLOOD PRESSURE: 121 MMHG | RESPIRATION RATE: 17 BRPM | WEIGHT: 241 LBS

## 2024-03-14 DIAGNOSIS — Z20.828 EXPOSURE TO VIRAL DISEASE: ICD-10-CM

## 2024-03-14 DIAGNOSIS — R10.11 RIGHT UPPER QUADRANT ABDOMINAL PAIN: Primary | ICD-10-CM

## 2024-03-14 DIAGNOSIS — R10.9 FLANK PAIN: ICD-10-CM

## 2024-03-14 DIAGNOSIS — I88.9 SUBMENTAL LYMPHADENITIS: ICD-10-CM

## 2024-03-14 DIAGNOSIS — B00.1 FEVER BLISTER: ICD-10-CM

## 2024-03-14 LAB
BILIRUB SERPL-MCNC: NEGATIVE MG/DL
BLOOD URINE, POC: NORMAL
COLOR, POC UA: COLORLESS
CTP QC/QA: YES
GLUCOSE UR QL STRIP: NEGATIVE
KETONES UR QL STRIP: NEGATIVE
LEUKOCYTE ESTERASE URINE, POC: NEGATIVE
MOLECULAR STREP A: NEGATIVE
NITRITE, POC UA: NEGATIVE
PH, POC UA: 6
PROTEIN, POC: NEGATIVE
S PYO RRNA THROAT QL PROBE: NEGATIVE
SARS-COV-2 AG RESP QL IA.RAPID: NEGATIVE
SARS-COV-2 RDRP RESP QL NAA+PROBE: NEGATIVE
SPECIFIC GRAVITY, POC UA: 1.02
UROBILINOGEN, POC UA: 0.2

## 2024-03-14 PROCEDURE — 3008F BODY MASS INDEX DOCD: CPT | Mod: CPTII,,, | Performed by: NURSE PRACTITIONER

## 2024-03-14 PROCEDURE — 87651 STREP A DNA AMP PROBE: CPT | Mod: QW,,, | Performed by: NURSE PRACTITIONER

## 2024-03-14 PROCEDURE — 85025 COMPLETE CBC W/AUTO DIFF WBC: CPT | Mod: ,,, | Performed by: CLINICAL MEDICAL LABORATORY

## 2024-03-14 PROCEDURE — 74018 RADEX ABDOMEN 1 VIEW: CPT | Mod: TC

## 2024-03-14 PROCEDURE — 81003 URINALYSIS AUTO W/O SCOPE: CPT | Mod: QW,,, | Performed by: NURSE PRACTITIONER

## 2024-03-14 PROCEDURE — 4010F ACE/ARB THERAPY RXD/TAKEN: CPT | Mod: CPTII,,, | Performed by: NURSE PRACTITIONER

## 2024-03-14 PROCEDURE — 3074F SYST BP LT 130 MM HG: CPT | Mod: CPTII,,, | Performed by: NURSE PRACTITIONER

## 2024-03-14 PROCEDURE — 3079F DIAST BP 80-89 MM HG: CPT | Mod: CPTII,,, | Performed by: NURSE PRACTITIONER

## 2024-03-14 PROCEDURE — 1159F MED LIST DOCD IN RCRD: CPT | Mod: CPTII,,, | Performed by: NURSE PRACTITIONER

## 2024-03-14 PROCEDURE — 87635 SARS-COV-2 COVID-19 AMP PRB: CPT | Mod: QW,,, | Performed by: NURSE PRACTITIONER

## 2024-03-14 PROCEDURE — 80053 COMPREHEN METABOLIC PANEL: CPT | Mod: ,,, | Performed by: CLINICAL MEDICAL LABORATORY

## 2024-03-14 PROCEDURE — 99213 OFFICE O/P EST LOW 20 MIN: CPT | Mod: ,,, | Performed by: NURSE PRACTITIONER

## 2024-03-14 NOTE — PROGRESS NOTES
Subjective:       Patient ID: Russel Hill is a 48 y.o. male.    Chief Complaint: Flank Pain (Right side flank pain started today. Have hx of colon caner. Recently had surgery 5 months ago. ), Foot Pain (Right foot pain. Chronic. ), and Mass (Mass under chin. Notice this morning. Painful. )    Flank Pain (Right side flank pain started today. Have hx of colon caner. Recently had surgery 5 months ago. ), Foot Pain (Right foot pain. Chronic. ), and Mass (Mass under chin. Notice this morning. Painful. ) pt was seen by his general surgeon yesterday for this same issue and was told to follow up with his PCP Charlotte Martinez today - states he called his PCP today and she told him she did not have any time available to see him today and directed him to this clinic    Review of Systems   Constitutional:  Negative for appetite change, fatigue and fever.   HENT:  Positive for mouth sores. Negative for nasal congestion, ear pain and sore throat.    Eyes:  Negative for pain, discharge and itching.   Respiratory:  Negative for cough and shortness of breath.    Cardiovascular:  Negative for chest pain and leg swelling.   Gastrointestinal:  Negative for abdominal pain, change in bowel habit, nausea and vomiting.   Genitourinary:  Positive for flank pain.   Musculoskeletal:  Positive for myalgias. Negative for back pain, gait problem and neck pain.   Integumentary:  Negative for rash and wound.   Neurological:  Negative for dizziness, weakness and headaches.   All other systems reviewed and are negative.        Objective:      Physical Exam  Vitals and nursing note reviewed.   Constitutional:       General: He is not in acute distress.     Appearance: Normal appearance. He is not ill-appearing, toxic-appearing or diaphoretic.   HENT:      Head: Normocephalic.      Right Ear: Tympanic membrane, ear canal and external ear normal.      Left Ear: Tympanic membrane, ear canal and external ear normal.      Nose: Nose normal. No  congestion or rhinorrhea.      Mouth/Throat:      Lips: Lesions present.      Mouth: Mucous membranes are moist.      Pharynx: Posterior oropharyngeal erythema present. No oropharyngeal exudate.     Eyes:      General: No scleral icterus.        Right eye: No discharge.         Left eye: No discharge.      Extraocular Movements: Extraocular movements intact.      Conjunctiva/sclera: Conjunctivae normal.      Pupils: Pupils are equal, round, and reactive to light.   Cardiovascular:      Rate and Rhythm: Normal rate and regular rhythm.      Pulses: Normal pulses.      Heart sounds: Normal heart sounds. No murmur heard.  Pulmonary:      Effort: Pulmonary effort is normal. No respiratory distress.      Breath sounds: Normal breath sounds. No wheezing, rhonchi or rales.   Abdominal:      General: Bowel sounds are normal.      Palpations: Abdomen is soft.      Tenderness: There is abdominal tenderness in the right upper quadrant. There is no right CVA tenderness, left CVA tenderness, guarding or rebound.      Comments: Mildly TTP   Musculoskeletal:         General: Normal range of motion.      Cervical back: Neck supple. No tenderness.   Lymphadenopathy:      Cervical: No cervical adenopathy.   Skin:     General: Skin is warm and dry.      Capillary Refill: Capillary refill takes less than 2 seconds.      Findings: No rash.   Neurological:      Mental Status: He is alert and oriented to person, place, and time.   Psychiatric:         Mood and Affect: Mood normal.         Behavior: Behavior normal.         Thought Content: Thought content normal.         Judgment: Judgment normal.          Assessment:       1. Right upper quadrant abdominal pain    2. Flank pain    3. Submental lymphadenitis    4. Fever blister        Plan:   Right upper quadrant abdominal pain    Flank pain  -     POCT URINALYSIS W/O SCOPE  -     X-Ray KUB; Future; Expected date: 03/14/2024  -     CBC Auto Differential; Future; Expected date:  03/14/2024  -     Comprehensive Metabolic Panel; Future; Expected date: 03/14/2024    Submental lymphadenitis  -     POCT rapid strep A  -     SARS Coronavirus 2 Antigen, POCT    Fever blister       Labs and Xray pending- will notify pt of results when available    Risks, benefits, and side effects were discussed with the patient. All questions were answered to the fullest satisfaction of the patient, and pt verbalized understanding and agreement to treatment plan. Pt was to call with any new or worsening symptoms, or present to the ER

## 2024-03-14 NOTE — LETTER
March 14, 2024      [facility]  [address] [facility] [place] [address]  Phone: [phone]  Fax: [phone]       Patient: Russel Hill   YOB: 1975  Date of Visit: 03/14/2024    To Whom It May Concern:    Asher Hill  was at [facility] on 03/14/2024. The patient may return to work/school on 03/16/2024 with no restrictions. If you have any questions or concerns, or if I can be of further assistance, please do not hesitate to contact me.    Sincerely,    MICAELA Ibanez

## 2024-03-15 LAB
ALBUMIN SERPL BCP-MCNC: 4.1 G/DL (ref 3.5–5)
ALBUMIN/GLOB SERPL: 1.1 {RATIO}
ALP SERPL-CCNC: 76 U/L (ref 45–115)
ALT SERPL W P-5'-P-CCNC: 59 U/L (ref 16–61)
ANION GAP SERPL CALCULATED.3IONS-SCNC: 6 MMOL/L (ref 7–16)
AST SERPL W P-5'-P-CCNC: 25 U/L (ref 15–37)
BASOPHILS # BLD AUTO: 0.06 K/UL (ref 0–0.2)
BASOPHILS NFR BLD AUTO: 0.9 % (ref 0–1)
BILIRUB SERPL-MCNC: 0.7 MG/DL (ref ?–1.2)
BUN SERPL-MCNC: 20 MG/DL (ref 7–18)
BUN/CREAT SERPL: 18 (ref 6–20)
CALCIUM SERPL-MCNC: 9.7 MG/DL (ref 8.5–10.1)
CHLORIDE SERPL-SCNC: 107 MMOL/L (ref 98–107)
CO2 SERPL-SCNC: 30 MMOL/L (ref 21–32)
CREAT SERPL-MCNC: 1.09 MG/DL (ref 0.7–1.3)
DIFFERENTIAL METHOD BLD: ABNORMAL
EGFR (NO RACE VARIABLE) (RUSH/TITUS): 84 ML/MIN/1.73M2
EOSINOPHIL # BLD AUTO: 0.12 K/UL (ref 0–0.5)
EOSINOPHIL NFR BLD AUTO: 1.8 % (ref 1–4)
ERYTHROCYTE [DISTWIDTH] IN BLOOD BY AUTOMATED COUNT: 15.2 % (ref 11.5–14.5)
GLOBULIN SER-MCNC: 3.9 G/DL (ref 2–4)
GLUCOSE SERPL-MCNC: 95 MG/DL (ref 74–106)
HCT VFR BLD AUTO: 43.1 % (ref 40–54)
HGB BLD-MCNC: 13.9 G/DL (ref 13.5–18)
IMM GRANULOCYTES # BLD AUTO: 0.02 K/UL (ref 0–0.04)
IMM GRANULOCYTES NFR BLD: 0.3 % (ref 0–0.4)
LYMPHOCYTES # BLD AUTO: 3.17 K/UL (ref 1–4.8)
LYMPHOCYTES NFR BLD AUTO: 46.5 % (ref 27–41)
MCH RBC QN AUTO: 25.4 PG (ref 27–31)
MCHC RBC AUTO-ENTMCNC: 32.3 G/DL (ref 32–36)
MCV RBC AUTO: 78.8 FL (ref 80–96)
MONOCYTES # BLD AUTO: 0.38 K/UL (ref 0–0.8)
MONOCYTES NFR BLD AUTO: 5.6 % (ref 2–6)
MPC BLD CALC-MCNC: 11.3 FL (ref 9.4–12.4)
NEUTROPHILS # BLD AUTO: 3.07 K/UL (ref 1.8–7.7)
NEUTROPHILS NFR BLD AUTO: 44.9 % (ref 53–65)
NRBC # BLD AUTO: 0 X10E3/UL
NRBC, AUTO (.00): 0 %
PLATELET # BLD AUTO: 233 K/UL (ref 150–400)
POTASSIUM SERPL-SCNC: 4.8 MMOL/L (ref 3.5–5.1)
PROT SERPL-MCNC: 8 G/DL (ref 6.4–8.2)
RBC # BLD AUTO: 5.47 M/UL (ref 4.6–6.2)
SODIUM SERPL-SCNC: 138 MMOL/L (ref 136–145)
WBC # BLD AUTO: 6.82 K/UL (ref 4.5–11)

## 2024-03-21 ENCOUNTER — TELEPHONE (OUTPATIENT)
Dept: FAMILY MEDICINE | Facility: CLINIC | Age: 49
End: 2024-03-21
Payer: COMMERCIAL

## 2024-03-21 NOTE — TELEPHONE ENCOUNTER
Pt notified of results. Voiced understanding. ----- Message from MICAELA Figueroa sent at 3/21/2024  9:28 AM CDT -----  Regarding: RE: RESULTS  Labs are fine  ----- Message -----  From: Adan Kaplan LPN  Sent: 3/21/2024   9:21 AM CDT  To: MICAELA Figueroa  Subject: FW: RESULTS                                      Please view patient results.   ----- Message -----  From: Mona Torres  Sent: 3/15/2024   2:32 PM CDT  To: #  Subject: RESULTS                                          CALLING ON RESULTS  [phone]

## 2024-05-13 PROBLEM — R39.198 ABNORMAL URINATION: Status: ACTIVE | Noted: 2024-05-13

## 2024-05-13 NOTE — PROGRESS NOTES
Subjective:       Patient ID: Russel Hill is a 48 y.o. male.    Chief Complaint: Follow-up    48-year-old male patient who underwent right colon resection in November of 2023, with pathology that showed well-differentiated adenocarcinoma, node negative.  He returns for six-month follow-up, and has complaints of occasional constipation as well as bilateral lower quadrant pain and epigastric pain.  He states that he experiences bloating after eating.  In addition to above he reports a split stream and pain with urination.  The patient also inquired about his next colonoscopy, and we determined that he is supposed to follow up for a colonoscopy in roughly 2 years.    Follow-up        family history includes Cancer in his maternal grandfather, maternal grandmother, mother, paternal grandfather, and paternal grandmother; Diabetes in his brother, father, maternal grandfather, maternal grandmother, mother, paternal grandfather, and paternal grandmother; Heart disease in his brother; Heart failure in his brother and brother; Hypertension in his brother, brother, father, maternal grandfather, maternal grandmother, mother, paternal grandfather, paternal grandmother, and sister; Kidney failure in his brother; No Known Problems in his daughter, daughter, daughter, daughter, son, and son.  Past Medical History:   Diagnosis Date    Erectile dysfunction     Hypercholesteremia 03/04/2016    Primary hypertension     Type 2 diabetes mellitus without complication, without long-term current use of insulin       Past Surgical History:   Procedure Laterality Date    CIRCUMCISION  1996    COLON RESECTION Right 11/16/2023    Procedure: COLON RESECTION;  Surgeon: Boby Godwin MD;  Location: [facility];  Service: General;  Laterality: Right;       reports that he has never smoked. He has never used smokeless tobacco. He reports current alcohol use. He reports that he does not currently use drugs.     Review of Systems   All other  "systems reviewed and are negative.         Objective:      BP (!) 174/68 (BP Location: Left arm, Patient Position: Sitting, BP Method: Large (Automatic))   Pulse 64   Temp 98.5 °F (36.9 °C) (Oral)   Resp 18   Ht 5' 6" (1.676 m)   Wt 96.6 kg (213 lb)   SpO2 100%   BMI 34.38 kg/m²    Physical Exam  Cardiovascular:      Rate and Rhythm: Normal rate and regular rhythm.      Heart sounds: Normal heart sounds.   Pulmonary:      Breath sounds: Normal breath sounds.   Abdominal:      Palpations: Abdomen is soft.      Hernia: No hernia is present.   Musculoskeletal:         General: No swelling.   Skin:     Coloration: Skin is not jaundiced.   Neurological:      General: No focal deficit present.      Mental Status: He is alert.   Psychiatric:         Mood and Affect: Mood normal.           Assessment/Plan:         Constipation, unspecified constipation type  -     Ambulatory referral/consult to Gastroenterology; Future    Abnormal urination  -     Ambulatory referral/consult to Urology; Future; Expected date: 03/20/2024    Malignant neoplasm of ascending colon         Problem List Items Addressed This Visit          Oncology    Malignant neoplasm of colon     Other Visit Diagnoses       Constipation, unspecified constipation type    -  Primary    Relevant Orders    Ambulatory referral/consult to Gastroenterology    Abnormal urination        Relevant Orders    Ambulatory referral/consult to Urology           "

## 2024-05-20 ENCOUNTER — OFFICE VISIT (OUTPATIENT)
Dept: FAMILY MEDICINE | Facility: CLINIC | Age: 49
End: 2024-05-20
Payer: COMMERCIAL

## 2024-05-20 VITALS
BODY MASS INDEX: 38.57 KG/M2 | WEIGHT: 240 LBS | TEMPERATURE: 98 F | OXYGEN SATURATION: 97 % | HEART RATE: 86 BPM | RESPIRATION RATE: 18 BRPM | DIASTOLIC BLOOD PRESSURE: 84 MMHG | SYSTOLIC BLOOD PRESSURE: 136 MMHG | HEIGHT: 66 IN

## 2024-05-20 DIAGNOSIS — E66.01 SEVERE OBESITY (BMI 35.0-39.9) WITH COMORBIDITY: ICD-10-CM

## 2024-05-20 DIAGNOSIS — E11.9 TYPE 2 DIABETES MELLITUS WITHOUT COMPLICATION, WITHOUT LONG-TERM CURRENT USE OF INSULIN: Primary | Chronic | ICD-10-CM

## 2024-05-20 DIAGNOSIS — E87.6 HYPOKALEMIA: ICD-10-CM

## 2024-05-20 DIAGNOSIS — Z79.899 ENCOUNTER FOR LONG-TERM (CURRENT) USE OF OTHER MEDICATIONS: ICD-10-CM

## 2024-05-20 DIAGNOSIS — E78.00 HYPERCHOLESTEREMIA: Chronic | ICD-10-CM

## 2024-05-20 DIAGNOSIS — Z23 NEED FOR DIPHTHERIA-TETANUS-PERTUSSIS (TDAP) VACCINE: ICD-10-CM

## 2024-05-20 DIAGNOSIS — I10 PRIMARY HYPERTENSION: Chronic | ICD-10-CM

## 2024-05-20 DIAGNOSIS — Z11.4 SCREENING FOR HIV (HUMAN IMMUNODEFICIENCY VIRUS): ICD-10-CM

## 2024-05-20 PROBLEM — I88.9 SUBMENTAL LYMPHADENITIS: Status: RESOLVED | Noted: 2024-03-14 | Resolved: 2024-05-20

## 2024-05-20 LAB
ALBUMIN SERPL BCP-MCNC: 3.6 G/DL (ref 3.5–5)
ALBUMIN/GLOB SERPL: 0.9 {RATIO}
ALP SERPL-CCNC: 65 U/L (ref 45–115)
ALT SERPL W P-5'-P-CCNC: 39 U/L (ref 16–61)
ANION GAP SERPL CALCULATED.3IONS-SCNC: 9 MMOL/L (ref 7–16)
AST SERPL W P-5'-P-CCNC: 27 U/L (ref 15–37)
BILIRUB SERPL-MCNC: 1 MG/DL (ref ?–1.2)
BUN SERPL-MCNC: 18 MG/DL (ref 7–18)
BUN/CREAT SERPL: 19 (ref 6–20)
CALCIUM SERPL-MCNC: 8.8 MG/DL (ref 8.5–10.1)
CHLORIDE SERPL-SCNC: 107 MMOL/L (ref 98–107)
CHOLEST SERPL-MCNC: 170 MG/DL (ref 0–200)
CHOLEST/HDLC SERPL: 2.6 {RATIO}
CO2 SERPL-SCNC: 26 MMOL/L (ref 21–32)
CREAT SERPL-MCNC: 0.96 MG/DL (ref 0.7–1.3)
EGFR (NO RACE VARIABLE) (RUSH/TITUS): 98 ML/MIN/1.73M2
EST. AVERAGE GLUCOSE BLD GHB EST-MCNC: 126 MG/DL
GLOBULIN SER-MCNC: 3.8 G/DL (ref 2–4)
GLUCOSE SERPL-MCNC: 113 MG/DL (ref 74–106)
HBA1C MFR BLD HPLC: 6 % (ref 4.5–6.6)
HDLC SERPL-MCNC: 65 MG/DL (ref 40–60)
HIV 1+O+2 AB SERPL QL: NORMAL
LDLC SERPL CALC-MCNC: 73 MG/DL
LDLC/HDLC SERPL: 1.1 {RATIO}
NONHDLC SERPL-MCNC: 105 MG/DL
POTASSIUM SERPL-SCNC: 3.7 MMOL/L (ref 3.5–5.1)
PROT SERPL-MCNC: 7.4 G/DL (ref 6.4–8.2)
SODIUM SERPL-SCNC: 138 MMOL/L (ref 136–145)
TRIGL SERPL-MCNC: 162 MG/DL (ref 35–150)
TSH SERPL DL<=0.005 MIU/L-ACNC: 0.83 UIU/ML (ref 0.36–3.74)
VLDLC SERPL-MCNC: 32 MG/DL

## 2024-05-20 PROCEDURE — 90471 IMMUNIZATION ADMIN: CPT | Mod: ,,, | Performed by: NURSE PRACTITIONER

## 2024-05-20 PROCEDURE — 90715 TDAP VACCINE 7 YRS/> IM: CPT | Mod: ,,, | Performed by: NURSE PRACTITIONER

## 2024-05-20 PROCEDURE — 3079F DIAST BP 80-89 MM HG: CPT | Mod: CPTII,,, | Performed by: NURSE PRACTITIONER

## 2024-05-20 PROCEDURE — 80061 LIPID PANEL: CPT | Mod: ,,, | Performed by: CLINICAL MEDICAL LABORATORY

## 2024-05-20 PROCEDURE — 99214 OFFICE O/P EST MOD 30 MIN: CPT | Mod: 25,,, | Performed by: NURSE PRACTITIONER

## 2024-05-20 PROCEDURE — 3075F SYST BP GE 130 - 139MM HG: CPT | Mod: CPTII,,, | Performed by: NURSE PRACTITIONER

## 2024-05-20 PROCEDURE — 1159F MED LIST DOCD IN RCRD: CPT | Mod: CPTII,,, | Performed by: NURSE PRACTITIONER

## 2024-05-20 PROCEDURE — 4010F ACE/ARB THERAPY RXD/TAKEN: CPT | Mod: CPTII,,, | Performed by: NURSE PRACTITIONER

## 2024-05-20 PROCEDURE — 87389 HIV-1 AG W/HIV-1&-2 AB AG IA: CPT | Mod: ,,, | Performed by: CLINICAL MEDICAL LABORATORY

## 2024-05-20 PROCEDURE — 3008F BODY MASS INDEX DOCD: CPT | Mod: CPTII,,, | Performed by: NURSE PRACTITIONER

## 2024-05-20 PROCEDURE — 1160F RVW MEDS BY RX/DR IN RCRD: CPT | Mod: CPTII,,, | Performed by: NURSE PRACTITIONER

## 2024-05-20 PROCEDURE — 80053 COMPREHEN METABOLIC PANEL: CPT | Mod: ,,, | Performed by: CLINICAL MEDICAL LABORATORY

## 2024-05-20 PROCEDURE — 84443 ASSAY THYROID STIM HORMONE: CPT | Mod: ,,, | Performed by: CLINICAL MEDICAL LABORATORY

## 2024-05-20 PROCEDURE — 83036 HEMOGLOBIN GLYCOSYLATED A1C: CPT | Mod: ,,, | Performed by: CLINICAL MEDICAL LABORATORY

## 2024-05-20 RX ORDER — SEMAGLUTIDE 2.68 MG/ML
2 INJECTION, SOLUTION SUBCUTANEOUS
Qty: 9 ML | Refills: 3 | Status: SHIPPED | OUTPATIENT
Start: 2024-05-20 | End: 2025-05-20

## 2024-05-20 RX ORDER — PRAVASTATIN SODIUM 20 MG/1
20 TABLET ORAL DAILY
Qty: 90 TABLET | Refills: 3 | Status: SHIPPED | OUTPATIENT
Start: 2024-05-20

## 2024-05-20 RX ORDER — CLONIDINE HYDROCHLORIDE 0.2 MG/1
0.2 TABLET ORAL NIGHTLY
Qty: 90 TABLET | Refills: 3 | Status: SHIPPED | OUTPATIENT
Start: 2024-05-20 | End: 2025-05-20

## 2024-05-20 RX ORDER — NIFEDIPINE 60 MG/1
60 TABLET, EXTENDED RELEASE ORAL DAILY
Qty: 90 TABLET | Refills: 3 | Status: SHIPPED | OUTPATIENT
Start: 2024-05-20 | End: 2025-05-20

## 2024-05-20 RX ORDER — TRIAMTERENE AND HYDROCHLOROTHIAZIDE 37.5; 25 MG/1; MG/1
1 CAPSULE ORAL DAILY
Qty: 90 CAPSULE | Refills: 3 | Status: SHIPPED | OUTPATIENT
Start: 2024-05-20 | End: 2025-05-20

## 2024-05-20 RX ORDER — METFORMIN HYDROCHLORIDE 500 MG/1
500 TABLET ORAL
Qty: 90 TABLET | Refills: 3 | Status: SHIPPED | OUTPATIENT
Start: 2024-05-20 | End: 2025-05-20

## 2024-05-20 RX ORDER — POTASSIUM CHLORIDE 20 MEQ/1
20 TABLET, EXTENDED RELEASE ORAL DAILY
Qty: 90 TABLET | Refills: 3 | Status: SHIPPED | OUTPATIENT
Start: 2024-05-20

## 2024-05-20 RX ORDER — LISINOPRIL 20 MG/1
20 TABLET ORAL DAILY
Qty: 90 TABLET | Refills: 3 | Status: SHIPPED | OUTPATIENT
Start: 2024-05-20 | End: 2025-05-20

## 2024-05-20 NOTE — ASSESSMENT & PLAN NOTE
Chronic, fairly well controlled.      Continue current treatment.  Hypertension Medications               cloNIDine (CATAPRES) 0.2 MG tablet Take 1 tablet (0.2 mg total) by mouth every evening.    lisinopriL (PRINIVIL,ZESTRIL) 20 MG tablet Take 1 tablet (20 mg total) by mouth once daily.    NIFEdipine (ADALAT CC) 60 MG TbSR Take 1 tablet (60 mg total) by mouth once daily.    triamterene-hydrochlorothiazide 37.5-25 mg (DYAZIDE) 37.5-25 mg per capsule Take 1 capsule by mouth once daily.

## 2024-05-20 NOTE — PROGRESS NOTES
[facility] - FAMILY [facility]       PATIENT NAME: Russel Hill   : 1975    AGE: 48 y.o. DATE OF ENCOUNTER: 24    MRN: [id number]      PCP: Charlotte Martinez FNP    Subjective:     Reason for Visit / Chief Complaint:     274}  Chief Complaint   Patient presents with    Hypertension    Diabetes    Follow-up     Patient presents to clinic for 6 month follow up of HTN and DM.  Patient complains of groin pain.  Has appointment to check prostate tomorrow.         HPI:    Presents for 6 mth f/u T2DM & HTN.  Wants to increase Ozempic to 2 mg now.    Reports has been having discomfort in lower abd and R testicle and states has appt tomorrow with Urology.  Reports was dx w/ kidney stone in March.     Review of Systems:     Review of Systems   Constitutional: Negative.    HENT: Negative.     Respiratory: Negative.     Cardiovascular: Negative.    Gastrointestinal:  Positive for abdominal pain and constipation. Negative for blood in stool, nausea and vomiting.   Skin: Negative.    Neurological: Negative.    Psychiatric/Behavioral: Negative.         Allergies and Meds: 274}     Review of patient's allergies indicates:   Allergen Reactions    Marijuana/cannabinoid      seizure    Melon      watermellon        Current Outpatient Medications   Medication Sig Dispense Refill    cloNIDine (CATAPRES) 0.2 MG tablet Take 1 tablet (0.2 mg total) by mouth every evening. 90 tablet 3    lisinopriL (PRINIVIL,ZESTRIL) 20 MG tablet Take 1 tablet (20 mg total) by mouth once daily. 90 tablet 3    metFORMIN (GLUCOPHAGE) 500 MG tablet Take 1 tablet (500 mg total) by mouth daily with breakfast. 90 tablet 3    NIFEdipine (ADALAT CC) 60 MG TbSR Take 1 tablet (60 mg total) by mouth once daily. 90 tablet 3    potassium chloride SA (K-DUR,KLOR-CON) 20 MEQ tablet Take 1 tablet (20 mEq total) by mouth once daily. 90 tablet 3    pravastatin (PRAVACHOL) 20 MG tablet Take 1 tablet (20 mg total) by mouth once daily. 90  "tablet 3    semaglutide (OZEMPIC) 2 mg/dose (8 mg/3 mL) PnIj Inject 2 mg into the skin every 7 days. 9 mL 3    triamterene-hydrochlorothiazide 37.5-25 mg (DYAZIDE) 37.5-25 mg per capsule Take 1 capsule by mouth once daily. 90 capsule 3     No current facility-administered medications for this visit.       Labs:274}   I have reviewed labs below:    Lab Results   Component Value Date    WBC 6.82 03/14/2024    RBC 5.47 03/14/2024    HGB 13.9 03/14/2024    HCT 43.1 03/14/2024     03/14/2024     03/14/2024    K 4.8 03/14/2024     03/14/2024    CALCIUM 9.7 03/14/2024    GLU 95 03/14/2024    BUN 20 (H) 03/14/2024    CREATININE 1.09 03/14/2024    ESTGFRAFRICA 113 05/12/2021    EGFRNONAA 69 06/16/2022    ALT 59 03/14/2024    AST 25 03/14/2024    CHOL 148 06/19/2023    TRIG 71 06/19/2023    HDL 76 (H) 06/19/2023    LDLCALC 58 06/19/2023    TSH 0.906 01/04/2022    PSA 0.437 06/19/2023    HGBA1C 5.9 11/16/2023    MICROALBUR 1.2 11/28/2023       Medical History: 274}     Past Medical History:   Diagnosis Date    Erectile dysfunction     Hypercholesteremia 03/04/2016    Primary hypertension     Type 2 diabetes mellitus without complication, without long-term current use of insulin       Social History     Tobacco Use   Smoking Status Never   Smokeless Tobacco Never      Past Surgical History:   Procedure Laterality Date    CIRCUMCISION  1996    COLON RESECTION Right 11/16/2023    Procedure: COLON RESECTION;  Surgeon: Boby Godwin MD;  Location: [facility];  Service: General;  Laterality: Right;        Health Maintenance: {      Immunization History   Administered Date(s) Administered    Pneumococcal Conjugate - 20 Valent 06/19/2023    Tdap 05/20/2024       Objective:  274}   Vital Signs  Temp: 97.7 °F (36.5 °C)  Temp Source: Oral  Pulse: 86  Resp: 18  SpO2: 97 %  BP: 136/84  BP Location: Left arm  Patient Position: Sitting  Pain Score:   8  Pain Loc: Groin  Height and Weight  Height: 5' 6" (167.6 " cm)  Weight: 108.9 kg (240 lb)  BSA (Calculated - sq m): 2.25 sq meters  BMI (Calculated): 38.8  Weight in (lb) to have BMI = 25: 154.6    Over the last two weeks how often have you been bothered by little interest or pleasure in doing things: 0  Over the last two weeks how often have you been bothered by feeling down, depressed or hopeless: 0  PHQ-2 Total Score: 0  PHQ-9 Score: 0  PHQ-9 Interpretation: Minimal or None    Wt Readings from Last 3 Encounters:   05/20/24 108.9 kg (240 lb)   03/14/24 109.3 kg (241 lb)   03/13/24 96.6 kg (213 lb)     Physical Exam  Vitals and nursing note reviewed.   Constitutional:       General: He is not in acute distress.     Appearance: Normal appearance. He is obese. He is not ill-appearing.   HENT:      Head: Normocephalic.   Eyes:      Conjunctiva/sclera: Conjunctivae normal.   Neck:      Trachea: Trachea normal.   Cardiovascular:      Rate and Rhythm: Normal rate and regular rhythm.      Pulses: Normal pulses.      Heart sounds: Normal heart sounds.   Pulmonary:      Effort: Pulmonary effort is normal. No respiratory distress.      Breath sounds: Normal breath sounds. No wheezing, rhonchi or rales.   Abdominal:      General: Bowel sounds are normal. There is no distension.      Palpations: Abdomen is soft. There is no mass.      Tenderness: There is abdominal tenderness (right lower abd). There is no guarding.   Musculoskeletal:      Cervical back: Neck supple.      Right lower leg: No edema.      Left lower leg: No edema.   Lymphadenopathy:      Cervical: No cervical adenopathy.   Skin:     General: Skin is warm and dry.      Coloration: Skin is not jaundiced or pale.   Neurological:      Mental Status: He is alert and oriented to person, place, and time.      Gait: Gait normal.   Psychiatric:         Mood and Affect: Mood normal.         Behavior: Behavior normal.          Assessment and Plan: 274}     1. Type 2 diabetes mellitus without complication, without long-term current  use of insulin  Assessment & Plan:  Lab Results   Component Value Date    HGBA1C 5.9 11/16/2023     Continue metformin.    Increase Ozempic to 2 mg weekly.    Orders:  -     Comprehensive Metabolic Panel; Future; Expected date: 05/20/2024  -     Hemoglobin A1C; Future; Expected date: 05/20/2024  -     semaglutide (OZEMPIC) 2 mg/dose (8 mg/3 mL) PnIj; Inject 2 mg into the skin every 7 days.  Dispense: 9 mL; Refill: 3  -     metFORMIN (GLUCOPHAGE) 500 MG tablet; Take 1 tablet (500 mg total) by mouth daily with breakfast.  Dispense: 90 tablet; Refill: 3    2. Primary hypertension  Assessment & Plan:  Chronic, fairly well controlled.      Continue current treatment.  Hypertension Medications               cloNIDine (CATAPRES) 0.2 MG tablet Take 1 tablet (0.2 mg total) by mouth every evening.    lisinopriL (PRINIVIL,ZESTRIL) 20 MG tablet Take 1 tablet (20 mg total) by mouth once daily.    NIFEdipine (ADALAT CC) 60 MG TbSR Take 1 tablet (60 mg total) by mouth once daily.    triamterene-hydrochlorothiazide 37.5-25 mg (DYAZIDE) 37.5-25 mg per capsule Take 1 capsule by mouth once daily.            Orders:  -     Lipid Panel; Future; Expected date: 05/20/2024  -     triamterene-hydrochlorothiazide 37.5-25 mg (DYAZIDE) 37.5-25 mg per capsule; Take 1 capsule by mouth once daily.  Dispense: 90 capsule; Refill: 3  -     NIFEdipine (ADALAT CC) 60 MG TbSR; Take 1 tablet (60 mg total) by mouth once daily.  Dispense: 90 tablet; Refill: 3  -     lisinopriL (PRINIVIL,ZESTRIL) 20 MG tablet; Take 1 tablet (20 mg total) by mouth once daily.  Dispense: 90 tablet; Refill: 3  -     cloNIDine (CATAPRES) 0.2 MG tablet; Take 1 tablet (0.2 mg total) by mouth every evening.  Dispense: 90 tablet; Refill: 3    3. Encounter for long-term (current) use of other medications  -     Comprehensive Metabolic Panel; Future; Expected date: 05/20/2024  -     Lipid Panel; Future; Expected date: 05/20/2024  -     TSH; Future; Expected date: 05/20/2024    4.  Screening for HIV (human immunodeficiency virus)  -     HIV 1/2 Ag/Ab (4th Gen); Future; Expected date: 05/20/2024    5. Hypokalemia  -     potassium chloride SA (K-DUR,KLOR-CON) 20 MEQ tablet; Take 1 tablet (20 mEq total) by mouth once daily.  Dispense: 90 tablet; Refill: 3    6. Need for diphtheria-tetanus-pertussis (Tdap) vaccine  -     Tdap (BOOSTRIX) vaccine injection 0.5 mL    7. Severe obesity (BMI 35.0-39.9) with comorbidity  Assessment & Plan:  Body mass index is 38.74 kg/m². Morbid obesity complicates all aspects of disease management from diagnostic modalities to treatment. Weight loss encouraged and health benefits explained to patient.   Has regained the weight he previously lost with lifestyle changes and Ozempic for T2DM.      8. Hypercholesteremia  -     pravastatin (PRAVACHOL) 20 MG tablet; Take 1 tablet (20 mg total) by mouth once daily.  Dispense: 90 tablet; Refill: 3      Diagnosis, risks, benefits, and side effects of meds and treatment plan were discussed with the patient.  Any workup results obtained in office or prior to appointment were reviewed.  Patient to call or follow-up with any new or worsening symptoms or problems prior to next appointment.  Go to ER for any urgent complications.  All questions were answered to the satisfaction of the patient, and pt verbalized understanding and agreement to treatment plan.      No follow-ups on file.    Signature:  MICAELA Lowry-BC    Future Appointments   Date Time Provider Department Center   5/21/2024  1:00 PM Joao Srivastava NP [facility]   9/5/2024 11:00 AM Charlotte Martinez FNP [facility] ZULY Torres

## 2024-05-20 NOTE — ASSESSMENT & PLAN NOTE
Body mass index is 38.74 kg/m². Morbid obesity complicates all aspects of disease management from diagnostic modalities to treatment. Weight loss encouraged and health benefits explained to patient.   Has regained the weight he previously lost with lifestyle changes and Ozempic for T2DM.

## 2024-05-20 NOTE — LETTER
May 20, 2024    Russel Hill  [address] [place] [address] [place] [address]             [facility]  Family Medicine  [address] [place] [address] [address] [place] [address]  Phone: [phone]  Fax: [phone]   May 20, 2024     Patient: Russel Hill   YOB: 1975   Date of Visit: 5/20/2024       To Whom it May Concern:    Russel Hill was seen in my clinic on 5/20/2024. He may return to work on 05/20/2024 .    Please excuse him from any classes or work missed.    If you have any questions or concerns, please don't hesitate to call.    Sincerely,         Charlotte Martinez, IZZYP

## 2024-05-20 NOTE — ASSESSMENT & PLAN NOTE
Lab Results   Component Value Date    HGBA1C 5.9 11/16/2023     Continue metformin.    Increase Ozempic to 2 mg weekly.

## 2024-05-21 ENCOUNTER — OFFICE VISIT (OUTPATIENT)
Dept: UROLOGY | Facility: CLINIC | Age: 49
End: 2024-05-21
Attending: SURGERY
Payer: COMMERCIAL

## 2024-05-21 VITALS
WEIGHT: 232 LBS | BODY MASS INDEX: 36.41 KG/M2 | SYSTOLIC BLOOD PRESSURE: 135 MMHG | HEIGHT: 67 IN | TEMPERATURE: 98 F | OXYGEN SATURATION: 95 % | HEART RATE: 96 BPM | DIASTOLIC BLOOD PRESSURE: 83 MMHG

## 2024-05-21 DIAGNOSIS — R30.0 DYSURIA: ICD-10-CM

## 2024-05-21 DIAGNOSIS — R39.198 ABNORMAL URINATION: ICD-10-CM

## 2024-05-21 DIAGNOSIS — N41.0 ACUTE PROSTATITIS: ICD-10-CM

## 2024-05-21 DIAGNOSIS — N13.8 BENIGN PROSTATIC HYPERPLASIA WITH URINARY OBSTRUCTION: Primary | ICD-10-CM

## 2024-05-21 DIAGNOSIS — N40.1 BENIGN PROSTATIC HYPERPLASIA WITH URINARY OBSTRUCTION: Primary | ICD-10-CM

## 2024-05-21 DIAGNOSIS — R35.1 NOCTURIA: ICD-10-CM

## 2024-05-21 PROCEDURE — 1159F MED LIST DOCD IN RCRD: CPT | Mod: CPTII,,, | Performed by: NURSE PRACTITIONER

## 2024-05-21 PROCEDURE — 1160F RVW MEDS BY RX/DR IN RCRD: CPT | Mod: CPTII,,, | Performed by: NURSE PRACTITIONER

## 2024-05-21 PROCEDURE — 3075F SYST BP GE 130 - 139MM HG: CPT | Mod: CPTII,,, | Performed by: NURSE PRACTITIONER

## 2024-05-21 PROCEDURE — 99203 OFFICE O/P NEW LOW 30 MIN: CPT | Mod: S$PBB,,, | Performed by: NURSE PRACTITIONER

## 2024-05-21 PROCEDURE — 99215 OFFICE O/P EST HI 40 MIN: CPT | Mod: PBBFAC | Performed by: NURSE PRACTITIONER

## 2024-05-21 PROCEDURE — 3079F DIAST BP 80-89 MM HG: CPT | Mod: CPTII,,, | Performed by: NURSE PRACTITIONER

## 2024-05-21 PROCEDURE — 87086 URINE CULTURE/COLONY COUNT: CPT | Mod: ,,, | Performed by: CLINICAL MEDICAL LABORATORY

## 2024-05-21 PROCEDURE — 3044F HG A1C LEVEL LT 7.0%: CPT | Mod: CPTII,,, | Performed by: NURSE PRACTITIONER

## 2024-05-21 PROCEDURE — 4010F ACE/ARB THERAPY RXD/TAKEN: CPT | Mod: CPTII,,, | Performed by: NURSE PRACTITIONER

## 2024-05-21 PROCEDURE — 3008F BODY MASS INDEX DOCD: CPT | Mod: CPTII,,, | Performed by: NURSE PRACTITIONER

## 2024-05-21 RX ORDER — TAMSULOSIN HYDROCHLORIDE 0.4 MG/1
CAPSULE ORAL
Qty: 90 CAPSULE | Refills: 3 | Status: SHIPPED | OUTPATIENT
Start: 2024-05-21

## 2024-05-21 RX ORDER — DOXYCYCLINE 100 MG/1
CAPSULE ORAL
Qty: 40 CAPSULE | Refills: 0 | Status: SHIPPED | OUTPATIENT
Start: 2024-05-21

## 2024-05-21 NOTE — PATIENT INSTRUCTIONS
Urine culture   Rx Doxycycline 100 mg take one capsule twice a day for 10 days then reduce to one daily, avoid direct sunlight, no milk, milk products or antacids within two hours of taking this medication -- discussed side effects to include a rash -- read up on the side effects (do not start this medication until we call you the culture results)   Rx Tamsulosin (Flomax) 0.4 mg take one capsule at bedtime every night (start tonight) -- discussed side effects to include dizziness and postural hypotension -- read up on the side effects   Follow up with Urology DERRICK Dill in 3 months or sooner if needed

## 2024-05-21 NOTE — PROGRESS NOTES
Subjective     Patient ID: Russel Hill is a 48 y.o. male.    Chief Complaint: No chief complaint on file.    This pleasant 48 year old male presents to the clinic as a new patient referral from Dr. SUBHASH Godwin for abnormal urination.  Patient states he is doing good today.  His IPSS score is 16 that reflects intermittency, weak stream, straining, and nocturia 1 time a night.  He reports dysuria and achying in the groin and lower abdomen. He states he sometimes has a split stream.  He denies dysuria, incomplete bladder emptying, frequency or urgency.  He denies fever, chills, nausea or vomiting.  He denies bladder or back pain.  He denies smoking or drinking alcohol. His PMH includes colon cancer, DM, and HTN.  I will culture his urine and treat if indicated.  I discussed treating for prostatitis with Doxycycline as outlined in the plan and the side effects.  I discussed adding Flomax 0.4 mg one at bedtime and the side effects.  He was encouraged to read up on the side effects.  I will see him back in the clinic in 3 months or sooner if needed.  I discussed the plan in detail with Dr. Arvizu and the patient and they are in agreement with the plan.  All his questions were answered at today's visit.  --------------------------------------------------------------------------------  [May 21, 2024].       Review of Systems   Constitutional:  Negative for activity change and fever.   HENT:  Negative for hearing loss and trouble swallowing.    Eyes:  Negative for visual disturbance.   Respiratory:  Negative for cough, shortness of breath and wheezing.    Cardiovascular:  Negative for chest pain.   Gastrointestinal:  Negative for abdominal pain, diarrhea, nausea and vomiting.   Endocrine: Negative for polyuria.   Genitourinary:  Positive for dysuria. Negative for bladder incontinence, decreased urine volume, difficulty urinating, discharge, enuresis, erectile dysfunction, flank pain, frequency, genital sores, hematuria,  penile pain, penile swelling, scrotal swelling, testicular pain and urgency.        BPH with obstruction         Prostatitis         Nocturia    Musculoskeletal:  Negative for back pain and gait problem.   Integumentary:  Negative for rash.   Neurological:  Negative for speech difficulty and weakness.   Psychiatric/Behavioral:  Negative for behavioral problems and confusion.           Objective     Physical Exam  Vitals and nursing note reviewed.   Constitutional:       General: He is not in acute distress.     Appearance: Normal appearance. He is not ill-appearing, toxic-appearing or diaphoretic.   HENT:      Head: Normocephalic.   Eyes:      Extraocular Movements: Extraocular movements intact.   Cardiovascular:      Rate and Rhythm: Normal rate and regular rhythm.      Heart sounds: Normal heart sounds.   Pulmonary:      Effort: Pulmonary effort is normal. No respiratory distress.      Breath sounds: Normal breath sounds. No wheezing, rhonchi or rales.   Abdominal:      General: Bowel sounds are normal.      Palpations: Abdomen is soft.      Tenderness: There is no abdominal tenderness. There is no right CVA tenderness, left CVA tenderness, guarding or rebound.   Musculoskeletal:         General: Normal range of motion.      Cervical back: Normal range of motion. No rigidity.   Skin:     General: Skin is warm and dry.   Neurological:      General: No focal deficit present.      Mental Status: He is alert and oriented to person, place, and time.      Motor: No weakness.      Coordination: Coordination normal.      Gait: Gait normal.   Psychiatric:         Mood and Affect: Mood normal.         Behavior: Behavior normal.         Thought Content: Thought content normal.          Assessment and Plan     Problem List Items Addressed This Visit          Renal/    Abnormal urination    Relevant Orders    Urine culture    Dysuria    Relevant Medications    doxycycline (VIBRAMYCIN) 100 MG Cap    Other Relevant Orders     Urine culture    Nocturia    Relevant Orders    Urine culture    Acute prostatitis    Relevant Medications    doxycycline (VIBRAMYCIN) 100 MG Cap    Other Relevant Orders    Urine culture    Benign prostatic hyperplasia with urinary obstruction - Primary    Relevant Medications    tamsulosin (FLOMAX) 0.4 mg Cap            Urine culture   Rx Doxycycline 100 mg take one capsule twice a day for 10 days then reduce to one daily, avoid direct sunlight, no milk, milk products or antacids within two hours of taking this medication -- discussed side effects to include a rash -- read up on the side effects (do not start this medication until we call you the culture results)   Rx Tamsulosin (Flomax) 0.4 mg take one capsule at bedtime every night (start tonight) -- discussed side effects to include dizziness and postural hypotension -- read up on the side effects   Follow up with Urology DERRICK Dill in 3 months or sooner if needed

## 2024-05-23 ENCOUNTER — TELEPHONE (OUTPATIENT)
Dept: UROLOGY | Facility: CLINIC | Age: 49
End: 2024-05-23
Payer: COMMERCIAL

## 2024-05-23 LAB — UA COMPLETE W REFLEX CULTURE PNL UR: NO GROWTH

## 2024-05-23 NOTE — TELEPHONE ENCOUNTER
----- Message from Joao Srivastava NP sent at 5/23/2024  8:19 AM CDT -----  Please let patient know his urine culture was negative, thanks   I called pt and got voice mail.  Left message that I am calling from urology with results and will call back later today.

## 2024-05-23 NOTE — TELEPHONE ENCOUNTER
----- Message from Joao Srivastava NP sent at 5/23/2024  8:19 AM CDT -----  Please let patient know his urine culture was negative, thanks   I called pt and spoke with him.  Informed him of the above message.  I told him he does not have a UTI at this time, so he needs to  his Doxycycline and start taking it for the Prostatitis.  1 po BID x 10 days then decrease to 1 daily until finished with the 40 capsules.  He voiced understanding and said he will get them tomorrow.

## 2024-07-15 NOTE — DISCHARGE SUMMARY
[facility]  General Surgery  Discharge Summary      Patient Name: Russel Hill  MRN: [id number]  Admission Date: 11/16/2023  Hospital Length of Stay: 5 days  Discharge Date and Time: 11/21/2023  6:50 PM  Attending Physician: No att. providers found   Discharging Provider: MICHELLE Lozoya  Primary Care Provider: Charlotte Martinez FNP    HPI:   No notes on file    Procedure(s) (LRB):  COLON RESECTION (Right)      Indwelling Lines/Drains at time of discharge:   Lines/Drains/Airways       None                 Hospital Course: 48-year-old male who is met maximum benefit of hospitalization after undergoing uncomplicated right colon resection for removal of tumor with ileo colostomy creation.  At discharge patient tolerated oral intake and reports flatus.  Surgical pathology pending.  Patient is to follow up with Dr. Godwin as scheduled    Goals of Care Treatment Preferences:  Code Status: Full Code      Consults:     Significant Diagnostic Studies: Labs: All labs within the past 24 hours have been reviewed    Pending Diagnostic Studies:       None          Final Active Diagnoses:    Diagnosis Date Noted POA    PRINCIPAL PROBLEM:  Malignant neoplasm of colon [C18.9] 10/31/2023 Yes    Primary hypertension [I10]  Yes     Chronic    Type 2 diabetes mellitus without complication, without long-term current use of insulin [E11.9]  Yes     Chronic      Problems Resolved During this Admission:      Discharged Condition: fair    Disposition: Home or Self Care    Follow Up:   Follow-up Information       Boby oGdwin MD. Schedule an appointment as soon as possible for a visit in 2 week(s).    Specialties: General Surgery, Surgery  Why: Please follow up on December 5 at 10:00  Contact information:  [address] [place] [id number]  [phone]                           Patient Instructions:      Diet diabetic     Notify your health care provider if you experience any of the following:   Subjective:      Patient ID: Frances Meek is a pleasant 89 y.o. female who presents today for:  Chief Complaint   Patient presents with    Other     Essential hypertension  (primary encounter diagnosis)  Gastroesophageal reflux disease without esophagitis         [facility]    Patient seen today for hypertension and med review.  Patient is refusing her losartan on questioning today on why this is.  She is thinks that it Believing it gives her bad heartburn.  She continues to eat and drink well without issue.  Did explain mechanism of action of losartan and low chance of causing heartburn after all these years.  Patient BP has been controlled, did tell her we can consider holding losartan for a week and monitoring BP, however for now we will likely continue without abatement.      Patient Active Problem List   Diagnosis    Choroidal nevus of right eye    CKD (chronic kidney disease) stage 3, GFR 30-59 ml/min (HCC)    Diverticulosis    Esophageal reflux    Essential hypertension    AMANDA (generalized anxiety disorder)    HLD (hyperlipidemia)    Mild episode of recurrent major depressive disorder (HCC)    Nonintractable headache    Obesity, Class I, BMI 30-34.9    Primary osteoarthritis of right knee    Paroxysmal atrial fibrillation (HCC)    Psychophysiological insomnia    Vitamin D deficiency    Chronic combined systolic and diastolic heart failure (HCC)    Hypertensive heart disease with heart failure (HCC)    Long term (current) use of anticoagulants    Thrombocytopenia, unspecified (HCC)    Acute diastolic (congestive) heart failure (HCC)    Valvular heart disease     Past Medical History:   Diagnosis Date    Atrial fibrillation (HCC)      No past surgical history on file.  Social History     Socioeconomic History    Marital status:      Spouse name: Not on file    Number of children: Not on file    Years of education: Not on file    Highest education level: Not on file   Occupational  temperature >100.4     Notify your health care provider if you experience any of the following:  persistent nausea and vomiting or diarrhea     Notify your health care provider if you experience any of the following:  severe uncontrolled pain     Notify your health care provider if you experience any of the following:  redness, tenderness, or signs of infection (pain, swelling, redness, odor or green/yellow discharge around incision site)     Notify your health care provider if you experience any of the following:  difficulty breathing or increased cough     Notify your health care provider if you experience any of the following:  severe persistent headache     Notify your health care provider if you experience any of the following:  worsening rash     Notify your health care provider if you experience any of the following:  persistent dizziness, light-headedness, or visual disturbances     Notify your health care provider if you experience any of the following:  increased confusion or weakness     Notify your health care provider if you experience any of the following:     Remove dressing in 48 hours     Activity as tolerated     Medications:  Reconciled Home Medications:      Medication List        START taking these medications      * bisacodyL 5 mg EC tablet  Commonly known as: DULCOLAX  Take 2 tablets (10 mg total) by mouth daily as needed for Constipation.     * bisacodyL 5 mg EC tablet  Commonly known as: DULCOLAX  Take 1 tablet (5 mg total) by mouth daily as needed for Constipation.     * HYDROcodone-acetaminophen 7.5-325 mg per tablet  Commonly known as: NORCO  Take 1 tablet by mouth every 8 (eight) hours as needed for Pain.     * HYDROcodone-acetaminophen 7.5-325 mg per tablet  Commonly known as: NORCO  Take 1 tablet by mouth every 6 (six) hours as needed for Pain.           * This list has 4 medication(s) that are the same as other medications prescribed for you. Read the directions carefully, and ask  your doctor or other care provider to review them with you.                CONTINUE taking these medications      cloNIDine 0.2 MG tablet  Commonly known as: CATAPRES  Take 1 tablet (0.2 mg total) by mouth every evening.     lisinopriL 20 MG tablet  Commonly known as: PRINIVIL,ZESTRIL  Take 1 tablet (20 mg total) by mouth once daily.     metFORMIN 500 MG tablet  Commonly known as: GLUCOPHAGE  Take 1 tablet (500 mg total) by mouth daily with breakfast.     NIFEdipine 60 MG Tbsr  Commonly known as: ADALAT CC  Take 1 tablet (60 mg total) by mouth once daily.     OZEMPIC 1 mg/dose (4 mg/3 mL)  Generic drug: semaglutide  Inject 1 mg into the skin every 7 days.     potassium chloride SA 20 MEQ tablet  Commonly known as: K-DUR,KLOR-CON  Take 1 tablet (20 mEq total) by mouth once daily.     pravastatin 20 MG tablet  Commonly known as: PRAVACHOL  Take 1 tablet (20 mg total) by mouth once daily.     triamterene-hydrochlorothiazide 37.5-25 mg 37.5-25 mg per capsule  Commonly known as: DYAZIDE  Take 1 capsule by mouth once daily.            Time spent on the discharge of patient: <30 minutes    MICHELLE Lozoya  General Surgery  [facility]

## 2024-08-18 NOTE — PROGRESS NOTES
Subjective     Patient ID: Russel Hill is a 48 y.o. male.    Chief Complaint: No chief complaint on file.    This pleasant 48 year old male presents to the clinic as a new patient referral from Dr. SUBHASH Godwin for abnormal urination.  Patient states he is doing good today.  His IPSS score is 16 that reflects intermittency, weak stream, straining, and nocturia 1 time a night.  He reports dysuria and achying in the groin and lower abdomen. He states he sometimes has a split stream.  He denies dysuria, incomplete bladder emptying, frequency or urgency.  He denies fever, chills, nausea or vomiting.  He denies bladder or back pain.  He denies smoking or drinking alcohol. His PMH includes colon cancer, DM, and HTN.  I will culture his urine and treat if indicated.  I discussed treating for prostatitis with Doxycycline as outlined in the plan and the side effects.  I discussed adding Flomax 0.4 mg one at bedtime and the side effects.  He was encouraged to read up on the side effects.  I will see him back in the clinic in 3 months or sooner if needed.  I discussed the plan in detail with Dr. Arvizu and the patient and they are in agreement with the plan.  All his questions were answered at today's visit.  --------------------------------------------------------------------------------  [May 21, 2024].          This pleasant 48 year old male presents to the clinic for follow up of BPH with LUTS.  Patient states he is doing good today. He reports today that he was told he had a kidney stone. He was treated with a month of Doxycycline 100 mg at the last visit.  His urine culture was negative on May 21, 2024. He was started on Flomax 0.4 mg at the last visit and is tolerating this medication without side effects.  His IPSS score  went up to 18 that reflects intermittency, weak stream, straining, and nocturia 3 times a night.  He reports intermittent dysuria. He states he sometimes has a split stream.  He denies hematuria,  incomplete bladder emptying, frequency or urgency.  He denies fever, chills, nausea or vomiting.  He denies bladder or back pain.  He denies smoking or drinking alcohol. His PMH includes colon cancer, DM, and HTN.  I will reculture his urine and treat if indicated.  I discussed doing the BPH work up with CT of the abdomen and pelvis and Cystoscopy for possible TURP and to evaluate the kidney stone.  He will continue the Flomax 0.4 mg one at bedtime.  We will make further recommendations after the work up is completed.   I discussed the plan in detail with  the patient and  he is in agreement with the plan.  All his questions were answered at today's visit.    ----------------------------------------------------------------------------------------------------------------------------------------------------------------------------------- [August 21, 2024].                 Review of Systems   Constitutional:  Negative for activity change and fever.   HENT:  Negative for hearing loss and trouble swallowing.    Eyes:  Negative for visual disturbance.   Respiratory:  Negative for cough, shortness of breath and wheezing.    Cardiovascular:  Negative for chest pain.   Gastrointestinal:  Negative for abdominal pain, diarrhea, nausea and vomiting.   Endocrine: Negative for polyuria.   Genitourinary:  Positive for dysuria. Negative for bladder incontinence, decreased urine volume, difficulty urinating, discharge, enuresis, erectile dysfunction, flank pain, frequency, genital sores, hematuria, penile pain, penile swelling, scrotal swelling, testicular pain and urgency.        BPH with LUTS        Nocturia        Prostatitis       Kidney Stone    Musculoskeletal:  Negative for back pain and gait problem.   Integumentary:  Negative for rash.   Neurological:  Negative for speech difficulty and weakness.   Psychiatric/Behavioral:  Negative for behavioral problems and confusion.           Objective     Physical Exam  Vitals and  nursing note reviewed.   Constitutional:       General: He is not in acute distress.     Appearance: Normal appearance. He is not ill-appearing, toxic-appearing or diaphoretic.   HENT:      Head: Normocephalic.   Eyes:      Extraocular Movements: Extraocular movements intact.   Cardiovascular:      Rate and Rhythm: Normal rate and regular rhythm.      Heart sounds: Normal heart sounds.   Pulmonary:      Effort: Pulmonary effort is normal. No respiratory distress.      Breath sounds: Normal breath sounds. No wheezing, rhonchi or rales.   Abdominal:      General: Bowel sounds are normal.      Palpations: Abdomen is soft.      Tenderness: There is no abdominal tenderness. There is no right CVA tenderness, left CVA tenderness, guarding or rebound.   Musculoskeletal:         General: Normal range of motion.      Cervical back: Normal range of motion. No rigidity.   Skin:     General: Skin is warm and dry.   Neurological:      General: No focal deficit present.      Mental Status: He is alert and oriented to person, place, and time.      Motor: No weakness.      Coordination: Coordination normal.      Gait: Gait normal.   Psychiatric:         Mood and Affect: Mood normal.         Behavior: Behavior normal.         Thought Content: Thought content normal.          Assessment and Plan     Problem List Items Addressed This Visit          Renal/    Dysuria    Relevant Orders    Urine culture    Cystoscopy    Prior authorization Order    Nocturia    Relevant Orders    Urine culture    Cystoscopy    Prior authorization Order    Benign prostatic hyperplasia with urinary obstruction - Primary    Relevant Orders    CT Abdomen Pelvis W Wo Contrast    Cystoscopy    Prior authorization Order    Chronic prostatitis    Relevant Orders    Cystoscopy    Prior authorization Order    Kidney stone    Relevant Orders    CT Abdomen Pelvis W Wo Contrast            Continue Tamsulosin (Flomax) 0.4 mg one capsule at bedtime   Urine culture    CT abdomen and pelvis with and without IV contrast on September 9, 2024 at 0800 am to evaluate kidney stones and BPH (do not go home after CT, come to Dr. Arvizu's office for Cystoscopy)   Cystoscopy with Dr. Avrizu on September 9, 2024 at 0900 am  to evaluate BPH for possible TURP   Will make further recommendations after Cystoscopy

## 2024-08-21 ENCOUNTER — OFFICE VISIT (OUTPATIENT)
Dept: UROLOGY | Facility: CLINIC | Age: 49
End: 2024-08-21
Payer: COMMERCIAL

## 2024-08-21 VITALS
OXYGEN SATURATION: 96 % | BODY MASS INDEX: 37.35 KG/M2 | SYSTOLIC BLOOD PRESSURE: 124 MMHG | HEIGHT: 67 IN | DIASTOLIC BLOOD PRESSURE: 82 MMHG | HEART RATE: 97 BPM | WEIGHT: 238 LBS | TEMPERATURE: 98 F

## 2024-08-21 DIAGNOSIS — R35.1 NOCTURIA: ICD-10-CM

## 2024-08-21 DIAGNOSIS — R30.0 DYSURIA: ICD-10-CM

## 2024-08-21 DIAGNOSIS — N13.8 BENIGN PROSTATIC HYPERPLASIA WITH URINARY OBSTRUCTION: Primary | ICD-10-CM

## 2024-08-21 DIAGNOSIS — N40.1 BENIGN PROSTATIC HYPERPLASIA WITH URINARY OBSTRUCTION: Primary | ICD-10-CM

## 2024-08-21 DIAGNOSIS — N41.1 CHRONIC PROSTATITIS: ICD-10-CM

## 2024-08-21 DIAGNOSIS — N20.0 KIDNEY STONE: ICD-10-CM

## 2024-08-21 PROCEDURE — 87086 URINE CULTURE/COLONY COUNT: CPT | Mod: ,,, | Performed by: CLINICAL MEDICAL LABORATORY

## 2024-08-21 PROCEDURE — 3079F DIAST BP 80-89 MM HG: CPT | Mod: CPTII,,, | Performed by: NURSE PRACTITIONER

## 2024-08-21 PROCEDURE — 99215 OFFICE O/P EST HI 40 MIN: CPT | Mod: PBBFAC | Performed by: NURSE PRACTITIONER

## 2024-08-21 PROCEDURE — 3044F HG A1C LEVEL LT 7.0%: CPT | Mod: CPTII,,, | Performed by: NURSE PRACTITIONER

## 2024-08-21 PROCEDURE — 3008F BODY MASS INDEX DOCD: CPT | Mod: CPTII,,, | Performed by: NURSE PRACTITIONER

## 2024-08-21 PROCEDURE — 99213 OFFICE O/P EST LOW 20 MIN: CPT | Mod: S$PBB,,, | Performed by: NURSE PRACTITIONER

## 2024-08-21 PROCEDURE — 3074F SYST BP LT 130 MM HG: CPT | Mod: CPTII,,, | Performed by: NURSE PRACTITIONER

## 2024-08-21 PROCEDURE — 1160F RVW MEDS BY RX/DR IN RCRD: CPT | Mod: CPTII,,, | Performed by: NURSE PRACTITIONER

## 2024-08-21 PROCEDURE — 4010F ACE/ARB THERAPY RXD/TAKEN: CPT | Mod: CPTII,,, | Performed by: NURSE PRACTITIONER

## 2024-08-21 PROCEDURE — 99999 PR PBB SHADOW E&M-EST. PATIENT-LVL V: CPT | Mod: PBBFAC,,, | Performed by: NURSE PRACTITIONER

## 2024-08-21 PROCEDURE — 1159F MED LIST DOCD IN RCRD: CPT | Mod: CPTII,,, | Performed by: NURSE PRACTITIONER

## 2024-08-21 NOTE — PATIENT INSTRUCTIONS
Continue Tamsulosin (Flomax) 0.4 mg one capsule at bedtime   Urine culture   CT abdomen and pelvis with and without IV contrast on September 9, 2024 at 0800 am to evaluate kidney stones and BPH (do not go home after CT, come to Dr. Arvizu's office for Cystoscopy)   Cystoscopy with Dr. Arvizu on September 9, 2024 at 0900 am  to evaluate BPH for possible TURP   Will make further recommendations after Cystoscopy

## 2024-08-23 ENCOUNTER — TELEPHONE (OUTPATIENT)
Dept: UROLOGY | Facility: CLINIC | Age: 49
End: 2024-08-23
Payer: COMMERCIAL

## 2024-08-23 LAB — UA COMPLETE W REFLEX CULTURE PNL UR: NO GROWTH

## 2024-08-23 NOTE — TELEPHONE ENCOUNTER
I called pt and informed him that his urine culture is negative, so he does not have a UTI at this time.  He has CT Scan scheduled for 9-9-24 at 8:00am and see Dr Arvizu for Cysto same day at 10:00am.  He voiced understanding.

## 2024-09-05 ENCOUNTER — OFFICE VISIT (OUTPATIENT)
Dept: FAMILY MEDICINE | Facility: CLINIC | Age: 49
End: 2024-09-05
Payer: COMMERCIAL

## 2024-09-05 VITALS
OXYGEN SATURATION: 96 % | WEIGHT: 241.19 LBS | DIASTOLIC BLOOD PRESSURE: 70 MMHG | HEART RATE: 91 BPM | SYSTOLIC BLOOD PRESSURE: 104 MMHG | RESPIRATION RATE: 18 BRPM | TEMPERATURE: 98 F | HEIGHT: 67 IN | BODY MASS INDEX: 37.85 KG/M2

## 2024-09-05 DIAGNOSIS — Z12.5 PROSTATE CANCER SCREENING: ICD-10-CM

## 2024-09-05 DIAGNOSIS — E11.9 TYPE 2 DIABETES MELLITUS WITHOUT COMPLICATION, WITHOUT LONG-TERM CURRENT USE OF INSULIN: Chronic | ICD-10-CM

## 2024-09-05 DIAGNOSIS — C18.9 MALIGNANT NEOPLASM OF COLON, UNSPECIFIED PART OF COLON: ICD-10-CM

## 2024-09-05 DIAGNOSIS — Z00.00 ROUTINE GENERAL MEDICAL EXAMINATION AT A HEALTH CARE FACILITY: Primary | ICD-10-CM

## 2024-09-05 DIAGNOSIS — Z13.1 DIABETES MELLITUS SCREENING: ICD-10-CM

## 2024-09-05 DIAGNOSIS — K59.04 CHRONIC IDIOPATHIC CONSTIPATION: Chronic | ICD-10-CM

## 2024-09-05 DIAGNOSIS — Z13.220 SCREENING FOR HYPERLIPIDEMIA: ICD-10-CM

## 2024-09-05 PROBLEM — R10.11 RIGHT UPPER QUADRANT ABDOMINAL PAIN: Status: RESOLVED | Noted: 2024-03-14 | Resolved: 2024-09-05

## 2024-09-05 PROBLEM — R10.9 FLANK PAIN: Status: RESOLVED | Noted: 2024-03-14 | Resolved: 2024-09-05

## 2024-09-05 LAB
CHOLEST SERPL-MCNC: 160 MG/DL (ref 0–200)
CHOLEST/HDLC SERPL: 2.7 {RATIO}
GLUCOSE SERPL-MCNC: 132 MG/DL (ref 74–106)
HDLC SERPL-MCNC: 60 MG/DL (ref 40–60)
LDLC SERPL CALC-MCNC: 70 MG/DL
LDLC/HDLC SERPL: 1.2 {RATIO}
NONHDLC SERPL-MCNC: 100 MG/DL
PSA SERPL-MCNC: 0.4 NG/ML
TRIGL SERPL-MCNC: 150 MG/DL (ref 35–150)
VLDLC SERPL-MCNC: 30 MG/DL

## 2024-09-05 PROCEDURE — 1160F RVW MEDS BY RX/DR IN RCRD: CPT | Mod: CPTII,,, | Performed by: NURSE PRACTITIONER

## 2024-09-05 PROCEDURE — 3074F SYST BP LT 130 MM HG: CPT | Mod: CPTII,,, | Performed by: NURSE PRACTITIONER

## 2024-09-05 PROCEDURE — 1159F MED LIST DOCD IN RCRD: CPT | Mod: CPTII,,, | Performed by: NURSE PRACTITIONER

## 2024-09-05 PROCEDURE — 3008F BODY MASS INDEX DOCD: CPT | Mod: CPTII,,, | Performed by: NURSE PRACTITIONER

## 2024-09-05 PROCEDURE — 99396 PREV VISIT EST AGE 40-64: CPT | Mod: ,,, | Performed by: NURSE PRACTITIONER

## 2024-09-05 PROCEDURE — 4010F ACE/ARB THERAPY RXD/TAKEN: CPT | Mod: CPTII,,, | Performed by: NURSE PRACTITIONER

## 2024-09-05 PROCEDURE — 82947 ASSAY GLUCOSE BLOOD QUANT: CPT | Mod: ,,, | Performed by: CLINICAL MEDICAL LABORATORY

## 2024-09-05 PROCEDURE — 3044F HG A1C LEVEL LT 7.0%: CPT | Mod: CPTII,,, | Performed by: NURSE PRACTITIONER

## 2024-09-05 PROCEDURE — 3078F DIAST BP <80 MM HG: CPT | Mod: CPTII,,, | Performed by: NURSE PRACTITIONER

## 2024-09-05 NOTE — LETTER
September 5, 2024    Russel Hill  [address] [place] [address] [place] [address]             [facility]  Family Medicine  [address] [place] [address] [address] [place] [address]  Phone: [phone]  Fax: [phone]   September 5, 2024     Patient: Russel Hill   YOB: 1975   Date of Visit: 9/5/2024       To Whom it May Concern:    Russel Hill was seen in my clinic on 9/5/2024. He may return to work on 09/06/2024 .    Please excuse him from any classes or work missed.    If you have any questions or concerns, please don't hesitate to call.    Sincerely,         Charlotte Martinez, IZZYP

## 2024-09-05 NOTE — PROGRESS NOTES
[facility]  [address] [place] [address]  Phone: [phone]  Fax: [phone]       PATIENT NAME: Russel Hill   : 1975    AGE: 48 y.o. DATE OF ENCOUNTER: 24    MRN: [id number]      PCP: Charlotte Martinez FNP    Subjective:     Reason for Visit / Chief Complaint:     274}  Chief Complaint   Patient presents with    [facility]     Patient reports to the clinic for [facility] visit.    Health Maintenance     Care gaps addressed, patient has not had his eye exam  yet.     Juliet Carmona CMA     Presents for [facility] visit.    Hx chronic constipation, colon CA dx Oct 2023.  Has been taking castor oil.  Linzess helped in past then quit working so he stopped taking it.  No help with fiber, stool softener, or miralax.     Review of Systems:     Review of Systems   Constitutional: Negative.    HENT: Negative.     Eyes: Negative.    Respiratory: Negative.     Cardiovascular: Negative.  Negative for chest pain, palpitations and leg swelling.   Gastrointestinal:  Positive for abdominal pain (reports chronic abd pain since surgery for colon cancer ) and constipation. Negative for blood in stool, nausea and vomiting.   Endocrine: Negative.    Genitourinary:  Positive for difficulty urinating.   Musculoskeletal: Negative.    Skin: Negative.    Allergic/Immunologic: Negative.    Neurological: Negative.    Hematological: Negative.    Psychiatric/Behavioral: Negative.         Allergies and Meds: 274}     Review of patient's allergies indicates:   Allergen Reactions    Marijuana/cannabinoid      seizure    Melon      watermellon        Current Outpatient Medications   Medication Sig Dispense Refill    cloNIDine (CATAPRES) 0.2 MG tablet Take 1 tablet (0.2 mg total) by mouth every evening. 90 tablet 3    lisinopriL (PRINIVIL,ZESTRIL) 20 MG tablet Take 1 tablet (20 mg total) by mouth once daily. 90 tablet 3    metFORMIN (GLUCOPHAGE) 500 MG tablet Take 1  tablet (500 mg total) by mouth daily with breakfast. 90 tablet 3    NIFEdipine (ADALAT CC) 60 MG TbSR Take 1 tablet (60 mg total) by mouth once daily. 90 tablet 3    potassium chloride SA (K-DUR,KLOR-CON) 20 MEQ tablet Take 1 tablet (20 mEq total) by mouth once daily. 90 tablet 3    pravastatin (PRAVACHOL) 20 MG tablet Take 1 tablet (20 mg total) by mouth once daily. 90 tablet 3    semaglutide (OZEMPIC) 2 mg/dose (8 mg/3 mL) PnIj Inject 2 mg into the skin every 7 days. 9 mL 3    tamsulosin (FLOMAX) 0.4 mg Cap Take one capsule every night 90 capsule 3    triamterene-hydrochlorothiazide 37.5-25 mg (DYAZIDE) 37.5-25 mg per capsule Take 1 capsule by mouth once daily. 90 capsule 3    linaCLOtide (LINZESS) 145 mcg Cap capsule Take 1 capsule (145 mcg total) by mouth before breakfast. 90 capsule 3     No current facility-administered medications for this visit.       Labs:274}   I have reviewed labs below:    Lab Results   Component Value Date    WBC 6.82 03/14/2024    RBC 5.47 03/14/2024    HGB 13.9 03/14/2024    HCT 43.1 03/14/2024     03/14/2024     05/20/2024    K 3.7 05/20/2024     05/20/2024    CALCIUM 8.8 05/20/2024     (H) 05/20/2024    BUN 18 05/20/2024    CREATININE 0.96 05/20/2024    ESTGFRAFRICA 113 05/12/2021    EGFRNONAA 69 06/16/2022    ALT 39 05/20/2024    AST 27 05/20/2024    CHOL 170 05/20/2024    TRIG 162 (H) 05/20/2024    HDL 65 (H) 05/20/2024    LDLCALC 73 05/20/2024    TSH 0.828 05/20/2024    PSA 0.437 06/19/2023    HGBA1C 6.0 05/20/2024    MICROALBUR 1.2 11/28/2023       Point Of Care Testing (if applicable):  Lab Results   Component Value Date    PRC94COPGTCJ Negative 03/14/2024    MOLSTREPAPOC Negative 03/14/2024     Any diagnostic testing results obtained in office or prior to appointment were reviewed were reviewed with patient.    Medical History: 274}     Past Medical History:   Diagnosis Date    Erectile dysfunction     Hypercholesteremia 03/04/2016    Primary  "hypertension     Type 2 diabetes mellitus without complication, without long-term current use of insulin       Social History     Tobacco Use   Smoking Status Never   Smokeless Tobacco Never      Past Surgical History:   Procedure Laterality Date    CIRCUMCISION  1996    COLON RESECTION Right 11/16/2023    Procedure: COLON RESECTION;  Surgeon: Boby Godwin MD;  Location: [facility];  Service: General;  Laterality: Right;        Health Maintenance:      Health Maintenance Topics with due status: Not Due       Topic Last Completion Date    Diabetes Urine Screening 11/28/2023    Foot Exam 11/28/2023    TETANUS VACCINE 05/20/2024    Lipid Panel 05/20/2024    Hemoglobin A1c 05/20/2024    Low Dose Statin 09/05/2024       Objective:  274}   Vital Signs  Temp: 98.4 °F (36.9 °C)  Temp Source: Oral  Pulse: 91  Resp: 18  SpO2: 96 %  BP: 104/70  BP Location: Right arm  Patient Position: Sitting  Pain Score: 0-No pain  Height and Weight  Height: 5' 7" (170.2 cm)  Weight: 109.4 kg (241 lb 3.2 oz)  BSA (Calculated - sq m): 2.27 sq meters  BMI (Calculated): 37.8  Weight in (lb) to have BMI = 25: 159.3    Over the last two weeks how often have you been bothered by little interest or pleasure in doing things: 0  Over the last two weeks how often have you been bothered by feeling down, depressed or hopeless: 0  PHQ-2 Total Score: 0    Wt Readings from Last 3 Encounters:   09/05/24 109.4 kg (241 lb 3.2 oz)   08/21/24 108 kg (238 lb)   05/21/24 105.2 kg (232 lb)     Physical Exam  Vitals and nursing note reviewed.   Constitutional:       General: He is not in acute distress.     Appearance: Normal appearance. He is obese. He is not ill-appearing.   HENT:      Head: Normocephalic.      Right Ear: Tympanic membrane, ear canal and external ear normal.      Left Ear: Tympanic membrane, ear canal and external ear normal.      Nose: Nose normal.      Mouth/Throat:      Mouth: Mucous membranes are moist.      Pharynx: Oropharynx is clear. " Uvula midline. No posterior oropharyngeal erythema or uvula swelling.   Eyes:      Conjunctiva/sclera: Conjunctivae normal.      Pupils: Pupils are equal, round, and reactive to light.   Neck:      Thyroid: No thyromegaly.      Vascular: Normal carotid pulses. No carotid bruit.   Cardiovascular:      Rate and Rhythm: Normal rate and regular rhythm.      Pulses: Normal pulses.      Heart sounds: Normal heart sounds.   Pulmonary:      Effort: Pulmonary effort is normal. No respiratory distress.      Breath sounds: Normal breath sounds. No wheezing, rhonchi or rales.   Abdominal:      Palpations: Abdomen is soft.      Tenderness: There is no abdominal tenderness.   Musculoskeletal:      Cervical back: Neck supple.      Right lower leg: No edema.      Left lower leg: No edema.   Lymphadenopathy:      Cervical: No cervical adenopathy.   Skin:     General: Skin is warm and dry.      Capillary Refill: Capillary refill takes less than 2 seconds.      Coloration: Skin is not jaundiced or pale.   Neurological:      General: No focal deficit present.      Mental Status: He is alert and oriented to person, place, and time.      Gait: Gait normal.   Psychiatric:         Mood and Affect: Mood normal.         Behavior: Behavior normal.          Assessment and Plan: 274}     1. Routine general medical examination at a health care facility    2. Diabetes mellitus screening  -     Glucose, Fasting; Future; Expected date: 09/05/2024    3. Screening for hyperlipidemia  -     Lipid Panel; Future; Expected date: 09/05/2024    4. Prostate cancer screening  -     PSA, Screening; Future; Expected date: 09/05/2024    5. Malignant neoplasm of colon, unspecified part of colon  Overview:  Resected in November of 2023, no evidence of recurrence to date    Assessment & Plan:  Order for colonoscopy entered for yearly surveillance due 10/19/24 per Dr. Victorino Ruiz.    Orders:  -     Colonoscopy; Future; Expected date: 09/05/2024    6. Type 2  diabetes mellitus without complication, without long-term current use of insulin  Assessment & Plan:  Lab Results   Component Value Date    HGBA1C 6.0 05/20/2024     Not time for A1c yet.  FPG for wellness visit today.  Continue metformin.    Continue Ozempic 2 mg weekly.      7. Chronic idiopathic constipation  Assessment & Plan:  Chronic, not controlled  Has been taking castor oil.  Linzess helped in past then quit working so he stopped taking it.  No help with fiber, stool softener, or miralax.  Mag citrate helps too well and knows he can not use it often.    Resume Linzess 145 mcg every morning on an empty stomach.    Orders:  -     linaCLOtide (LINZESS) 145 mcg Cap capsule; Take 1 capsule (145 mcg total) by mouth before breakfast.  Dispense: 90 capsule; Refill: 3      Diagnosis, risks, benefits, and side effects of any meds and treatment plan were discussed with the patient.  All questions were answered to the satisfaction of the patient, and pt verbalized understanding and agreement to treatment plan.      Follow up in about 6 months (around 3/5/2025) for T2DM, with nonfasting lab, and wellness with fasting labs in 1 year.    Signature:  MICAELA Lowry-BC    Future Appointments   Date Time Provider Department Center   9/9/2024  8:00 AM [facility] CT[facility] CTIC [facility]   9/9/2024 10:00 AM Siddharth Arvizu Jr., MD [facility] UROL [facility]   3/12/2025 10:20 AM Charlotte Martinez FNP [facility]Y Torres   9/16/2025  9:20 AM Charlotte Martinez FNP [facility]

## 2024-09-05 NOTE — ASSESSMENT & PLAN NOTE
Lab Results   Component Value Date    HGBA1C 6.0 05/20/2024     Not time for A1c yet.  FPG for wellness visit today.  Continue metformin.    Continue Ozempic 2 mg weekly.

## 2024-09-05 NOTE — ASSESSMENT & PLAN NOTE
Chronic, not controlled  Has been taking castor oil.  Linzess helped in past then quit working so he stopped taking it.  No help with fiber, stool softener, or miralax.  Mag citrate helps too well and knows he can not use it often.    Resume Linzess 145 mcg every morning on an empty stomach.

## 2024-09-09 ENCOUNTER — PROCEDURE VISIT (OUTPATIENT)
Dept: UROLOGY | Facility: CLINIC | Age: 49
End: 2024-09-09
Payer: COMMERCIAL

## 2024-09-09 ENCOUNTER — HOSPITAL ENCOUNTER (OUTPATIENT)
Dept: RADIOLOGY | Facility: HOSPITAL | Age: 49
Discharge: HOME | End: 2024-09-09
Attending: NURSE PRACTITIONER
Payer: COMMERCIAL

## 2024-09-09 VITALS
HEIGHT: 67 IN | TEMPERATURE: 98 F | HEART RATE: 78 BPM | RESPIRATION RATE: 18 BRPM | OXYGEN SATURATION: 96 % | DIASTOLIC BLOOD PRESSURE: 90 MMHG | SYSTOLIC BLOOD PRESSURE: 122 MMHG | BODY MASS INDEX: 37.78 KG/M2

## 2024-09-09 DIAGNOSIS — N40.1 BENIGN PROSTATIC HYPERPLASIA WITH URINARY OBSTRUCTION: ICD-10-CM

## 2024-09-09 DIAGNOSIS — N32.0 BLADDER OUTLET OBSTRUCTION: ICD-10-CM

## 2024-09-09 DIAGNOSIS — R35.1 NOCTURIA: ICD-10-CM

## 2024-09-09 DIAGNOSIS — R30.0 DYSURIA: ICD-10-CM

## 2024-09-09 DIAGNOSIS — N13.8 BENIGN PROSTATIC HYPERPLASIA WITH URINARY OBSTRUCTION: ICD-10-CM

## 2024-09-09 DIAGNOSIS — N20.0 KIDNEY STONE: ICD-10-CM

## 2024-09-09 DIAGNOSIS — N41.1 CHRONIC PROSTATITIS: Primary | ICD-10-CM

## 2024-09-09 LAB — CREAT SERPL-MCNC: 0.9 MG/DL (ref 0.6–1.3)

## 2024-09-09 PROCEDURE — 74178 CT ABD&PLV WO CNTR FLWD CNTR: CPT | Mod: 26,,, | Performed by: RADIOLOGY

## 2024-09-09 PROCEDURE — 74178 CT ABD&PLV WO CNTR FLWD CNTR: CPT | Mod: TC

## 2024-09-09 PROCEDURE — 82565 ASSAY OF CREATININE: CPT

## 2024-09-09 PROCEDURE — 52000 CYSTOURETHROSCOPY: CPT | Mod: PBBFAC | Performed by: UROLOGY

## 2024-09-09 PROCEDURE — 25500020 PHARM REV CODE 255: Performed by: NURSE PRACTITIONER

## 2024-09-09 PROCEDURE — 52000 CYSTOURETHROSCOPY: CPT | Mod: S$PBB,,, | Performed by: UROLOGY

## 2024-09-09 RX ORDER — LIDOCAINE HYDROCHLORIDE 20 MG/ML
JELLY TOPICAL
Status: COMPLETED | OUTPATIENT
Start: 2024-09-09 | End: 2024-09-09

## 2024-09-09 RX ORDER — IOPAMIDOL 755 MG/ML
100 INJECTION, SOLUTION INTRAVASCULAR
Status: COMPLETED | OUTPATIENT
Start: 2024-09-09 | End: 2024-09-09

## 2024-09-09 RX ADMIN — LIDOCAINE HYDROCHLORIDE 10 ML: 20 JELLY TOPICAL at 10:09

## 2024-09-09 RX ADMIN — IOPAMIDOL 100 ML: 755 INJECTION, SOLUTION INTRAVENOUS at 08:09

## 2024-09-09 NOTE — PATIENT INSTRUCTIONS
CT grossly unremarkable.  Reviewed with patient.  Cystoscopy revealed some posterior urethritis and early BPH but no significant obstruction noted.  Patient feels he is voiding better on the tamsulosin so he will continue.  Patient given Cipro 500 mg b.i.d. for 3 days to cover instrumentation.  Appointment with Mr. Rigoberto Srivastava in about 6 months for follow-up.

## 2024-09-09 NOTE — PROCEDURES
Flexible cystoscopy    Preoperative diagnosis:  Benign prostatic hyperplasia with prostatism    Postoperative diagnosis:   Early BPH and chronic prostatitis with prostatism    Description:  The patient was placed in the supine position in the clinic cystoscopy room.  Urethra was anesthetized with lidocaine jelly.  No sedation was given.  The 16.5 [place] Olympus flexible digital cystoscope was passed transurethrally into the bladder.  Anterior urethra was clear.  Posterior urethra had a relatively small prostate and was about 3 cm in maximum length.  There was some erythema of the posterior urethra but  it was mild.  Bladder neck was a little high sitting but otherwise unremarkable.  The bladder was carefully inspected.  No tumors, stones, or diverticula were seen.  Ureteral orifices were normal in size, shape, and position with bilateral clear efflux.  There were only very minimal trabeculations present.  Retrograde view of the bladder neck revealed no intravesical extension of prostatic tissue.  I did not see any significant abnormalities except prostate hyperplasia which is very early and mild.  I did not see any reason for significant hematuria.  Scope was removed with similar findings and patient returned to regular exam room in satisfactory condition.  -------------------------------------------------------------------------------------------------------------------------------------------------------------------------------------------------------------------------------------------------------------------------------------------------------------  EXAMINATION:  CT ABDOMEN PELVIS W WO CONTRAST     CLINICAL HISTORY:  kidney stones and bph;Benign prostatic hyperplasia with lower urinary tract symptoms     TECHNIQUE:  Low dose axial images, sagittal and coronal reformations were obtained from the lung bases to the pubic symphysis before and following the IV administration of 100 mL of Omnipaque 350.      COMPARISON:  None     FINDINGS:  There is no evidence renal calculus.     The lung bases are clear.  The liver, spleen, adrenal glands, kidneys and pancreas are unremarkable.  The gallbladder is in place.     There is no evidence bowel obstruction.  Stool is seen throughout the colon.  The patient is status post right hemicolectomy.  There is no evidence of abdominal nor pelvic lymphadenopathy.  There is atherosclerotic disease of the aorta.  The osseous structures are unremarkable.     Impression:     There is no evidence renal calculus.        Electronically signed by:Samantha Mixon MD  Date:                                            09/09/2024  Time:                                           10:54

## 2024-09-09 NOTE — PROCEDURES
This pleasant 48 year old male presents to the clinic as a new patient referral from Dr. SUBHASH Godwin for abnormal urination.  Patient states he is doing good today.  His IPSS score is 16 that reflects intermittency, weak stream, straining, and nocturia 1 time a night.  He reports dysuria and achying in the groin and lower abdomen. He states he sometimes has a split stream.  He denies dysuria, incomplete bladder emptying, frequency or urgency.  He denies fever, chills, nausea or vomiting.  He denies bladder or back pain.  He denies smoking or drinking alcohol. His PMH includes colon cancer, DM, and HTN.  I will culture his urine and treat if indicated.  I discussed treating for prostatitis with Doxycycline as outlined in the plan and the side effects.  I discussed adding Flomax 0.4 mg one at bedtime and the side effects.  He was encouraged to read up on the side effects.  I will see him back in the clinic in 3 months or sooner if needed.  I discussed the plan in detail with Dr. Arvizu and the patient and they are in agreement with the plan.  All his questions were answered at today's visit.  --------------------------------------------------------------------------------  [May 21, 2024].            This pleasant 48 year old male presents to the clinic for follow up of BPH with LUTS.  Patient states he is doing good today. He reports today that he was told he had a kidney stone. He was treated with a month of Doxycycline 100 mg at the last visit.  His urine culture was negative on May 21, 2024. He was started on Flomax 0.4 mg at the last visit and is tolerating this medication without side effects.  His IPSS score  went up to 18 that reflects intermittency, weak stream, straining, and nocturia 3 times a night.  He reports intermittent dysuria. He states he sometimes has a split stream.  He denies hematuria, incomplete bladder emptying, frequency or urgency.  He denies fever, chills, nausea or vomiting.  He denies bladder  or back pain.  He denies smoking or drinking alcohol. His PMH includes colon cancer, DM, and HTN.  I will reculture his urine and treat if indicated.  I discussed doing the BPH work up with CT of the abdomen and pelvis and Cystoscopy for possible TURP and to evaluate the kidney stone.  He will continue the Flomax 0.4 mg one at bedtime.  We will make further recommendations after the work up is completed.   I discussed the plan in detail with  the patient and  he is in agreement with the plan.  All his questions were answered at today's visit.    ----------------------------------------------------------------------------------------------------------------------------------------------------------------------------------- [August 21, 2024].      Review of Systems   Constitutional:  Negative for activity change and fever.   HENT:  Negative for hearing loss and trouble swallowing.    Eyes:  Negative for visual disturbance.   Respiratory:  Negative for cough, shortness of breath and wheezing.    Cardiovascular:  Negative for chest pain.   Gastrointestinal:  Negative for abdominal pain, diarrhea, nausea and vomiting.   Endocrine: Negative for polyuria.   Genitourinary:  Positive for dysuria. Negative for bladder incontinence, decreased urine volume, difficulty urinating, discharge, enuresis, erectile dysfunction, flank pain, frequency, genital sores, hematuria, penile pain, penile swelling, scrotal swelling, testicular pain and urgency.        BPH with LUTS        Nocturia        Prostatitis       Kidney Stone    Musculoskeletal:  Negative for back pain and gait problem.   Integumentary:  Negative for rash.   Neurological:  Negative for speech difficulty and weakness.   Psychiatric/Behavioral:  Negative for behavioral problems and confusion.    -----------------------------------------------------------------------------------------------------------------------------  The above note is the note of Mr. Rigoberto Srivastava.

## 2025-01-08 ENCOUNTER — OFFICE VISIT (OUTPATIENT)
Dept: FAMILY MEDICINE | Facility: CLINIC | Age: 50
End: 2025-01-08
Payer: COMMERCIAL

## 2025-01-08 VITALS
TEMPERATURE: 99 F | OXYGEN SATURATION: 96 % | RESPIRATION RATE: 18 BRPM | SYSTOLIC BLOOD PRESSURE: 104 MMHG | BODY MASS INDEX: 39.08 KG/M2 | WEIGHT: 249 LBS | HEART RATE: 82 BPM | HEIGHT: 67 IN | DIASTOLIC BLOOD PRESSURE: 71 MMHG

## 2025-01-08 DIAGNOSIS — R06.02 SHORTNESS OF BREATH: ICD-10-CM

## 2025-01-08 DIAGNOSIS — Z82.49 FH: HEART DISEASE: ICD-10-CM

## 2025-01-08 DIAGNOSIS — E11.9 TYPE 2 DIABETES MELLITUS WITHOUT COMPLICATION, WITHOUT LONG-TERM CURRENT USE OF INSULIN: Primary | Chronic | ICD-10-CM

## 2025-01-08 DIAGNOSIS — R00.2 PALPITATIONS: ICD-10-CM

## 2025-01-08 PROBLEM — N41.0 ACUTE PROSTATITIS: Status: RESOLVED | Noted: 2024-05-21 | Resolved: 2025-01-08

## 2025-01-08 PROBLEM — R30.0 DYSURIA: Status: RESOLVED | Noted: 2024-05-21 | Resolved: 2025-01-08

## 2025-01-08 PROBLEM — R39.198 ABNORMAL URINATION: Status: RESOLVED | Noted: 2024-05-13 | Resolved: 2025-01-08

## 2025-01-08 PROBLEM — R35.1 NOCTURIA: Status: RESOLVED | Noted: 2024-05-21 | Resolved: 2025-01-08

## 2025-01-08 LAB
ANION GAP SERPL CALCULATED.3IONS-SCNC: 13 MMOL/L (ref 7–16)
BASOPHILS # BLD AUTO: 0.06 K/UL (ref 0–0.2)
BASOPHILS NFR BLD AUTO: 0.9 % (ref 0–1)
BUN SERPL-MCNC: 27 MG/DL (ref 9–21)
BUN/CREAT SERPL: 23 (ref 6–20)
CALCIUM SERPL-MCNC: 9.5 MG/DL (ref 8.4–10.2)
CHLORIDE SERPL-SCNC: 105 MMOL/L (ref 98–107)
CO2 SERPL-SCNC: 23 MMOL/L (ref 22–29)
CREAT SERPL-MCNC: 1.15 MG/DL (ref 0.72–1.25)
CREAT UR-MCNC: 113 MG/DL (ref 23–375)
DIFFERENTIAL METHOD BLD: ABNORMAL
EGFR (NO RACE VARIABLE) (RUSH/TITUS): 78 ML/MIN/1.73M2
EOSINOPHIL # BLD AUTO: 0.15 K/UL (ref 0–0.5)
EOSINOPHIL NFR BLD AUTO: 2.1 % (ref 1–4)
ERYTHROCYTE [DISTWIDTH] IN BLOOD BY AUTOMATED COUNT: 14.9 % (ref 11.5–14.5)
EST. AVERAGE GLUCOSE BLD GHB EST-MCNC: 137 MG/DL
GLUCOSE SERPL-MCNC: 90 MG/DL (ref 74–100)
HBA1C MFR BLD HPLC: 6.4 %
HCT VFR BLD AUTO: 44 % (ref 40–54)
HGB BLD-MCNC: 13.8 G/DL (ref 13.5–18)
IMM GRANULOCYTES # BLD AUTO: 0.03 K/UL (ref 0–0.04)
IMM GRANULOCYTES NFR BLD: 0.4 % (ref 0–0.4)
LYMPHOCYTES # BLD AUTO: 2.93 K/UL (ref 1–4.8)
LYMPHOCYTES NFR BLD AUTO: 41.6 % (ref 27–41)
MAGNESIUM SERPL-MCNC: 2.1 MG/DL (ref 1.6–2.6)
MCH RBC QN AUTO: 26.2 PG (ref 27–31)
MCHC RBC AUTO-ENTMCNC: 31.4 G/DL (ref 32–36)
MCV RBC AUTO: 83.7 FL (ref 80–96)
MICROALBUMIN UR-MCNC: 2.5 MG/DL
MICROALBUMIN/CREAT RATIO PNL UR: 22.1 MG/G (ref 0–30)
MONOCYTES # BLD AUTO: 0.4 K/UL (ref 0–0.8)
MONOCYTES NFR BLD AUTO: 5.7 % (ref 2–6)
MPC BLD CALC-MCNC: 11 FL (ref 9.4–12.4)
NEUTROPHILS # BLD AUTO: 3.47 K/UL (ref 1.8–7.7)
NEUTROPHILS NFR BLD AUTO: 49.3 % (ref 53–65)
NRBC # BLD AUTO: 0 X10E3/UL
NRBC, AUTO (.00): 0 %
PLATELET # BLD AUTO: 265 K/UL (ref 150–400)
POTASSIUM SERPL-SCNC: 4 MMOL/L (ref 3.5–5.1)
RBC # BLD AUTO: 5.26 M/UL (ref 4.6–6.2)
SODIUM SERPL-SCNC: 137 MMOL/L (ref 136–145)
TSH SERPL DL<=0.005 MIU/L-ACNC: 0.62 UIU/ML (ref 0.35–4.94)
WBC # BLD AUTO: 7.04 K/UL (ref 4.5–11)

## 2025-01-08 PROCEDURE — 3074F SYST BP LT 130 MM HG: CPT | Mod: CPTII,,, | Performed by: NURSE PRACTITIONER

## 2025-01-08 PROCEDURE — 3044F HG A1C LEVEL LT 7.0%: CPT | Mod: CPTII,,, | Performed by: NURSE PRACTITIONER

## 2025-01-08 PROCEDURE — 82570 ASSAY OF URINE CREATININE: CPT | Mod: ,,, | Performed by: CLINICAL MEDICAL LABORATORY

## 2025-01-08 PROCEDURE — 85025 COMPLETE CBC W/AUTO DIFF WBC: CPT | Mod: ,,, | Performed by: CLINICAL MEDICAL LABORATORY

## 2025-01-08 PROCEDURE — 84443 ASSAY THYROID STIM HORMONE: CPT | Mod: ,,, | Performed by: CLINICAL MEDICAL LABORATORY

## 2025-01-08 PROCEDURE — 3078F DIAST BP <80 MM HG: CPT | Mod: CPTII,,, | Performed by: NURSE PRACTITIONER

## 2025-01-08 PROCEDURE — 80048 BASIC METABOLIC PNL TOTAL CA: CPT | Mod: ,,, | Performed by: CLINICAL MEDICAL LABORATORY

## 2025-01-08 PROCEDURE — 99214 OFFICE O/P EST MOD 30 MIN: CPT | Mod: ,,, | Performed by: NURSE PRACTITIONER

## 2025-01-08 PROCEDURE — 1160F RVW MEDS BY RX/DR IN RCRD: CPT | Mod: CPTII,,, | Performed by: NURSE PRACTITIONER

## 2025-01-08 PROCEDURE — 1159F MED LIST DOCD IN RCRD: CPT | Mod: CPTII,,, | Performed by: NURSE PRACTITIONER

## 2025-01-08 PROCEDURE — 4010F ACE/ARB THERAPY RXD/TAKEN: CPT | Mod: CPTII,,, | Performed by: NURSE PRACTITIONER

## 2025-01-08 PROCEDURE — 83036 HEMOGLOBIN GLYCOSYLATED A1C: CPT | Mod: ,,, | Performed by: CLINICAL MEDICAL LABORATORY

## 2025-01-08 PROCEDURE — 83735 ASSAY OF MAGNESIUM: CPT | Mod: ,,, | Performed by: CLINICAL MEDICAL LABORATORY

## 2025-01-08 PROCEDURE — 82043 UR ALBUMIN QUANTITATIVE: CPT | Mod: ,,, | Performed by: CLINICAL MEDICAL LABORATORY

## 2025-01-08 PROCEDURE — 3008F BODY MASS INDEX DOCD: CPT | Mod: CPTII,,, | Performed by: NURSE PRACTITIONER

## 2025-01-08 NOTE — PROGRESS NOTES
[facility]  [address] [place] [address]  Phone: [phone]  Fax: [phone]       PATIENT NAME: Russel Hill   : 1975    AGE: 49 y.o. DATE OF ENCOUNTER: 25    MRN: [id number]      PCP: Charlotte Martinez FNP    Subjective:   CHANGE CHIEF COMPLAINT      :[id number]}274}  Chief Complaint   Patient presents with    Diabetes    Follow-up     Patient presents to clinic for follow up of DM     History of Present Illness    HPI:  Patient reports palpitations, described as a fluttering sensation in the heart, and shortness of breath, particularly at work. The onset of these symptoms is recent but not precisely defined. He expresses uncertainty about the reality of these symptoms. 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 The symptoms seem to occur more frequently at work, affecting his perceived ability to move quickly if necessary.    Patient resumed exercising last week after a period of inactivity. His girlfriend has also started exercising with him following her type 2 diabetes diagnosis. Patient acknowledges an 8-pound weight gain over the past 4 months.    Patient reports a history of colon cancer in  and is scheduled for a follow-up colonoscopy on  with Dr. Victorino Ruiz. He reports side pain similar to what he felt before his initial diagnosis.    Patient mentions a history of kidney stones.    Regarding diabetes management, the patient admits to irregular blood sugar monitoring and occasionally taking medication without prior glucose level checks. He speculates this might contribute to his symptoms, particularly if his blood sugar is low when taking medication.    Patient denies chest pain and any current medical diagnoses.      ROS:  Constitutional: +weight gain  Respiratory: +shortness of breath  Cardiovascular: +palpitations         Allergies and Meds: 274}     Review of patient's  allergies indicates:   Allergen Reactions    Marijuana/cannabinoid      seizure    Melpau moodylynn        Current Outpatient Medications   Medication Sig Dispense Refill    cloNIDine (CATAPRES) 0.2 MG tablet Take 1 tablet (0.2 mg total) by mouth every evening. 90 tablet 3    linaCLOtide (LINZESS) 145 mcg Cap capsule Take 1 capsule (145 mcg total) by mouth before breakfast. 90 capsule 3    lisinopriL (PRINIVIL,ZESTRIL) 20 MG tablet Take 1 tablet (20 mg total) by mouth once daily. 90 tablet 3    metFORMIN (GLUCOPHAGE) 500 MG tablet Take 1 tablet (500 mg total) by mouth daily with breakfast. 90 tablet 3    NIFEdipine (ADALAT CC) 60 MG TbSR Take 1 tablet (60 mg total) by mouth once daily. 90 tablet 3    potassium chloride SA (K-DUR,KLOR-CON) 20 MEQ tablet Take 1 tablet (20 mEq total) by mouth once daily. 90 tablet 3    pravastatin (PRAVACHOL) 20 MG tablet Take 1 tablet (20 mg total) by mouth once daily. 90 tablet 3    semaglutide (OZEMPIC) 2 mg/dose (8 mg/3 mL) PnIj Inject 2 mg into the skin every 7 days. 9 mL 3    tamsulosin (FLOMAX) 0.4 mg Cap Take one capsule every night 90 capsule 3    triamterene-hydrochlorothiazide 37.5-25 mg (DYAZIDE) 37.5-25 mg per capsule Take 1 capsule by mouth once daily. 90 capsule 3     No current facility-administered medications for this visit.       Labs:274}   I have reviewed labs below:    Lab Results   Component Value Date    WBC 6.82 03/14/2024    RBC 5.47 03/14/2024    HGB 13.9 03/14/2024    HCT 43.1 03/14/2024     03/14/2024     05/20/2024    K 3.7 05/20/2024     05/20/2024    CALCIUM 8.8 05/20/2024     (H) 05/20/2024    BUN 18 05/20/2024    CREATININE 0.96 05/20/2024    ESTGFRAFRICA 113 05/12/2021    EGFRNONAA 69 06/16/2022    ALT 39 05/20/2024    AST 27 05/20/2024    CHOL 160 09/05/2024    TRIG 150 09/05/2024    HDL 60 09/05/2024    LDLCALC 70 09/05/2024    TSH 0.828 05/20/2024    PSA 0.396 09/05/2024    HGBA1C 6.0 05/20/2024    MICROALBUR 1.2  "11/28/2023       Medical History: 274}     Past Medical History:   Diagnosis Date    Erectile dysfunction     Hypercholesteremia 03/04/2016    Primary hypertension     Type 2 diabetes mellitus without complication, without long-term current use of insulin       Social History     Tobacco Use   Smoking Status Never   Smokeless Tobacco Never      Past Surgical History:   Procedure Laterality Date    CIRCUMCISION  1996    COLON RESECTION Right 11/16/2023    Procedure: COLON RESECTION;  Surgeon: Boby Godwin MD;  Location: [facility];  Service: General;  Laterality: Right;        Health Maintenance:      Health Maintenance Topics with due status: Not Due       Topic Last Completion Date    TETANUS VACCINE 05/20/2024    Lipid Panel 09/05/2024    Low Dose Statin 01/08/2025    Diabetes Urine Screening 01/08/2025    Foot Exam 01/08/2025    Hemoglobin A1c 01/08/2025    RSV Vaccine (Age 60+ and Pregnant patients) Not Due       Objective:  274}   Vital Signs  Temp: 98.5 °F (36.9 °C)  Temp Source: Oral  Pulse: 82  Resp: 18  SpO2: 96 %  BP: 104/71  BP Location: Left arm  Patient Position: Sitting  Pain Score: 0-No pain  Height and Weight  Height: 5' 7" (170.2 cm)  Weight: 112.9 kg (249 lb)  BSA (Calculated - sq m): 2.31 sq meters  BMI (Calculated): 39  Weight in (lb) to have BMI = 25: 159.3    Over the last two weeks how often have you been bothered by little interest or pleasure in doing things: 0  Over the last two weeks how often have you been bothered by feeling down, depressed or hopeless: 0  PHQ-2 Total Score: 0    Wt Readings from Last 3 Encounters:   01/08/25 112.9 kg (249 lb)   09/05/24 109.4 kg (241 lb 3.2 oz)   08/21/24 108 kg (238 lb)     Physical Exam  Vitals and nursing note reviewed.   Constitutional:       General: He is not in acute distress.     Appearance: Normal appearance. He is not ill-appearing.   HENT:      Head: Normocephalic.   Eyes:      Conjunctiva/sclera: Conjunctivae normal.      Pupils: Pupils " are equal, round, and reactive to light.   Neck:      Thyroid: No thyromegaly.      Vascular: No carotid bruit.      Trachea: Trachea normal.   Cardiovascular:      Rate and Rhythm: Normal rate and regular rhythm.      Pulses:           Dorsalis pedis pulses are 3+ on the right side and 3+ on the left side.        Posterior tibial pulses are 3+ on the right side and 3+ on the left side.      Heart sounds: Normal heart sounds.   Pulmonary:      Effort: Pulmonary effort is normal. No respiratory distress.      Breath sounds: Normal breath sounds. No wheezing, rhonchi or rales.   Musculoskeletal:      Cervical back: Neck supple.      Right lower leg: No edema.      Left lower leg: No edema.      Right foot: Normal range of motion. No deformity or bunion.      Left foot: Normal range of motion. No deformity or bunion.   Feet:      Right foot:      Protective Sensation: 6 sites tested.  6 sites sensed.      Skin integrity: Dry skin present. No ulcer, blister, erythema, warmth, callus or fissure.      Toenail Condition: Right toenails are abnormally thick. Fungal disease present.     Left foot:      Protective Sensation: 6 sites tested.  6 sites sensed.      Skin integrity: Dry skin present. No ulcer, blister, erythema, warmth, callus or fissure.      Toenail Condition: Left toenails are abnormally thick. Fungal disease present.  Lymphadenopathy:      Cervical: No cervical adenopathy.      Upper Body:      Right upper body: No supraclavicular adenopathy.      Left upper body: No supraclavicular adenopathy.   Skin:     General: Skin is warm and dry.      Coloration: Skin is not jaundiced or pale.      Findings: No rash.   Neurological:      General: No focal deficit present.      Mental Status: He is alert and oriented to person, place, and time.      Gait: Gait normal.   Psychiatric:         Mood and Affect: Mood normal.         Behavior: Behavior normal.          Assessment and Plan: 274}       1. Type 2 diabetes  mellitus without complication, without long-term current use of insulin  Assessment & Plan:  Lab Results   Component Value Date    HGBA1C 6.0 05/20/2024    HGBA1C 5.9 11/16/2023    HGBA1C 5.8 06/19/2023     Continue metformin.    Continue Ozempic 2 mg weekly.    Orders:  -     Microalbumin/Creatinine Ratio, Urine; Future; Expected date: 01/08/2025  -     Basic Metabolic Panel; Future; Expected date: 01/08/2025  -     Hemoglobin A1C; Future; Expected date: 01/08/2025  -     Echo; Future; Expected date: 01/15/2025  -     Ambulatory referral/consult to Cardiology; Future; Expected date: 01/15/2025  -     Ambulatory referral/consult to Optometry; Future; Expected date: 01/08/2025    2. Shortness of breath  -     Echo; Future; Expected date: 01/15/2025  -     Ambulatory referral/consult to Cardiology; Future; Expected date: 01/15/2025    3. Palpitations  -     Magnesium; Future; Expected date: 01/08/2025  -     CBC Auto Differential; Future; Expected date: 01/08/2025  -     TSH; Future; Expected date: 01/08/2025  -     EKG 12-lead; Future; Expected date: 01/08/2025  -     Echo; Future; Expected date: 01/15/2025  -     Ambulatory referral/consult to Cardiology; Future; Expected date: 01/15/2025    4. FH: heart disease        Assessment & Plan    IMPRESSION:  - Conducted cardiac assessment due to reported palpitations, shortness of breath, and family history of heart issues  - Evaluated blood pressure, noting good control at 104/71 on current lisinopril regimen  - Assessed diabetes management, noting recent weight gain but historically good A1C levels  - Considered potential link between reported symptoms and hydration status    DIABETES:  - Explained the increased risk of cardiac complications, including congestive heart failure and myocardial infarctions, associated with diabetes.  - Ordered annual diabetes urine screening.  - Ordered comprehensive labs.  - Noted the patient's A1C level have been favorable, with the last  check in May 2024.  - Performed foot exam, noting good circulation.    WEIGHT MANAGEMENT:  - Observed the patient has gained weight.  - Commended the patient for initiating exercise regimen last week and returning to the gym with a .  - Commended the patient's efforts to improve dietary and exercise habits.  - Noted the patient reports reducing unhealthy eating habits.    HYPERTENSION:  - Continued lisinopril 20 mg daily for hypertension management.  - Measured blood pressure at 104/71, indicating well-controlled hypertension.  - Advised the patient to consistently check blood pressure before medication administration.    ANXIETY:  - Inquired about occupational stress and anxiety.  - Noted the patient reports feeling like having a panic attack, experiencing palpitations, and feeling unusual.    DYSPNEA:  - Observed the patient reports dyspnea.  - Recommend cardiac workup to exclude cardiovascular issues.    COLON CANCER HISTORY:  - Noted the patient reports recurring lateral pain similar to previous colon cancer symptoms.  - Acknowledged upcoming colonoscopy appointment.  - Confirmed follow-up colonoscopy scheduled for February 4th with Dr. Victorino Ruiz.    HYDRATION:  - Discussed the importance of maintaining adequate hydration and its potential impact on reported symptoms.  - Patient to increase daily water intake.    FOLLOW UP:  - Patient to continue with recently started exercise routine.       Follow up in about 4 months (around 5/8/2025) for T2DM.    Signature:  MICAELA Lowry-BC    Future Appointments   Date Time Provider Department Center   1/23/2025  9:00 AM [facility] ECHO [facility]   1/23/2025 10:00 AM [facility] HOLTER/EKG [facility]   1/27/2025  2:30 PM Artem Babin MD MIKE CARD [facility]   2/4/2025  8:30 AM [facility] GI ROOM 02 RASCH ENDO [facility]   3/11/2025 10:20 AM Joao Srivastava NP EVER UROL [facility]   3/12/2025 10:20 AM Charlotte Martinez  [facility]   5/8/2025 11:20 AM Charlotte Martinez, MICAELA [facility]   9/16/2025  9:20 AM Charlotte Martinez, MICAELA Torres     This note was generated with the assistance of ambient listening technology. Verbal consent was obtained by the patient and accompanying visitor(s) for the recording of patient appointment to facilitate this note. I attest to having reviewed and edited the generated note for accuracy, though some syntax or spelling errors may persist. Please contact the author of this note for any clarification.

## 2025-01-08 NOTE — ASSESSMENT & PLAN NOTE
Lab Results   Component Value Date    HGBA1C 6.0 05/20/2024    HGBA1C 5.9 11/16/2023    HGBA1C 5.8 06/19/2023     Continue metformin.    Continue Ozempic 2 mg weekly.

## 2025-01-08 NOTE — LETTER
January 8, 2025      [facility]  [address] [place] [address]  Phone: [phone]  Fax: [phone]       Patient: Russel Hill   YOB: 1975  Date of Visit: 01/08/2025    To Whom It May Concern:    Asher Hill  was at [facility] on 01/08/2025. The patient may return to work/school on 1/8/2025 with no restrictions. If you have any questions or concerns, or if I can be of further assistance, please do not hesitate to contact me.    Sincerely,    Jennifer Hernandez MA

## 2025-01-10 ENCOUNTER — OFFICE VISIT (OUTPATIENT)
Dept: FAMILY MEDICINE | Facility: CLINIC | Age: 50
End: 2025-01-10
Payer: COMMERCIAL

## 2025-01-10 VITALS
BODY MASS INDEX: 39.08 KG/M2 | WEIGHT: 249 LBS | HEART RATE: 112 BPM | HEIGHT: 67 IN | SYSTOLIC BLOOD PRESSURE: 113 MMHG | DIASTOLIC BLOOD PRESSURE: 76 MMHG | OXYGEN SATURATION: 95 % | RESPIRATION RATE: 18 BRPM | TEMPERATURE: 103 F

## 2025-01-10 DIAGNOSIS — J10.1 INFLUENZA A: ICD-10-CM

## 2025-01-10 DIAGNOSIS — J06.9 UPPER RESPIRATORY TRACT INFECTION, UNSPECIFIED TYPE: Primary | ICD-10-CM

## 2025-01-10 LAB
CTP QC/QA: YES
CTP QC/QA: YES
POC MOLECULAR INFLUENZA A AGN: POSITIVE
POC MOLECULAR INFLUENZA B AGN: NEGATIVE
SARS-COV-2 RDRP RESP QL NAA+PROBE: NEGATIVE

## 2025-01-10 RX ORDER — PROMETHAZINE HYDROCHLORIDE AND DEXTROMETHORPHAN HYDROBROMIDE 6.25; 15 MG/5ML; MG/5ML
5 SYRUP ORAL EVERY 4 HOURS PRN
Qty: 118 ML | Refills: 0 | Status: SHIPPED | OUTPATIENT
Start: 2025-01-10 | End: 2025-01-20

## 2025-01-10 RX ORDER — OSELTAMIVIR PHOSPHATE 75 MG/1
75 CAPSULE ORAL 2 TIMES DAILY
Qty: 10 CAPSULE | Refills: 0 | Status: SHIPPED | OUTPATIENT
Start: 2025-01-10 | End: 2025-01-15

## 2025-01-10 NOTE — LETTER
January 10, 2025    Russel Hill  [address] [place] [address] [place] [address]             [facility]  Family Medicine  [address] [place] [address] [place] [address]  Phone: [phone]  Fax: [phone]   January 10, 2025     Patient: Russel Hill   YOB: 1975   Date of Visit: 1/10/2025       To Whom it May Concern:    Russel Hill was seen in my clinic on 1/10/2025. He may return to work on 1/14/2025 .    Please excuse him from any classes or work missed.    If you have any questions or concerns, please don't hesitate to call.    Sincerely,         Mary Lee, IZZYP

## 2025-01-10 NOTE — PROGRESS NOTES
MICAELA Laguna        PATIENT NAME: Russel Hill  : 1975  DATE: 1/10/25  MRN: [id number]      Patient PCP Information       Provider PCP Type    MICAELA Lowry General            Reason for Visit / Chief Complaint: Fever       Update PCP  Update Chief Complaint         History of Present Illness / Problem Focused Workflow     Russel Hill presents to the clinic with Fever     Fever           Medical / Social / Family History     Past Medical History:   Diagnosis Date    Erectile dysfunction     Hypercholesteremia 2016    Primary hypertension     Type 2 diabetes mellitus without complication, without long-term current use of insulin        Past Surgical History:   Procedure Laterality Date    CIRCUMCISION      COLON RESECTION Right 2023    Procedure: COLON RESECTION;  Surgeon: Boby Godwin MD;  Location: [facility];  Service: General;  Laterality: Right;       Social History    reports that he has never smoked. He has never used smokeless tobacco. He reports current alcohol use. He reports that he does not currently use drugs.    Family History  's family history includes Cancer in his maternal grandfather, maternal grandmother, mother, paternal grandfather, and paternal grandmother; Diabetes in his brother, father, maternal grandfather, maternal grandmother, mother, paternal grandfather, and paternal grandmother; Heart disease in his brother; Heart failure in his brother and brother; Hypertension in his brother, brother, father, maternal grandfather, maternal grandmother, mother, paternal grandfather, paternal grandmother, and sister; Kidney failure in his brother; No Known Problems in his daughter, daughter, daughter, daughter, son, and son.    Medications and Allergies     Medications  Outpatient Medications Marked as Taking for the 1/10/25 encounter (Office Visit) with Mary Lee FNP   Medication Sig Dispense Refill    cloNIDine (CATAPRES)  "0.2 MG tablet Take 1 tablet (0.2 mg total) by mouth every evening. 90 tablet 3    linaCLOtide (LINZESS) 145 mcg Cap capsule Take 1 capsule (145 mcg total) by mouth before breakfast. 90 capsule 3    lisinopriL (PRINIVIL,ZESTRIL) 20 MG tablet Take 1 tablet (20 mg total) by mouth once daily. 90 tablet 3    metFORMIN (GLUCOPHAGE) 500 MG tablet Take 1 tablet (500 mg total) by mouth daily with breakfast. 90 tablet 3    NIFEdipine (ADALAT CC) 60 MG TbSR Take 1 tablet (60 mg total) by mouth once daily. 90 tablet 3    potassium chloride SA (K-DUR,KLOR-CON) 20 MEQ tablet Take 1 tablet (20 mEq total) by mouth once daily. 90 tablet 3    pravastatin (PRAVACHOL) 20 MG tablet Take 1 tablet (20 mg total) by mouth once daily. 90 tablet 3    semaglutide (OZEMPIC) 2 mg/dose (8 mg/3 mL) PnIj Inject 2 mg into the skin every 7 days. 9 mL 3    tamsulosin (FLOMAX) 0.4 mg Cap Take one capsule every night 90 capsule 3    triamterene-hydrochlorothiazide 37.5-25 mg (DYAZIDE) 37.5-25 mg per capsule Take 1 capsule by mouth once daily. 90 capsule 3       Allergies  Review of patient's allergies indicates:   Allergen Reactions    Marijuana/cannabinoid      seizure    Melon      watermellon       Physical Examination     Vitals:    01/10/25 0916   BP: 113/76   BP Location: Left arm   Patient Position: Sitting   Pulse: (!) 112   Resp: 18   Temp: (!) 102.7 °F (39.3 °C)   TempSrc: Oral   SpO2: 95%   Weight: 112.9 kg (249 lb)   Height: 5' 7" (1.702 m)       Physical Exam  Vitals reviewed.   Constitutional:       Appearance: Normal appearance.   HENT:      Head: Normocephalic.      Right Ear: External ear normal.      Left Ear: External ear normal.      Nose: Congestion present.      Mouth/Throat:      Mouth: Mucous membranes are moist.   Eyes:      Extraocular Movements: Extraocular movements intact.   Cardiovascular:      Rate and Rhythm: Normal rate.      Pulses: Normal pulses.      Heart sounds: Normal heart sounds.   Pulmonary:      Effort: " Pulmonary effort is normal. No respiratory distress.      Breath sounds: Normal breath sounds. No stridor. No wheezing, rhonchi or rales.   Chest:      Chest wall: No tenderness.   Musculoskeletal:         General: Normal range of motion.      Cervical back: Normal range of motion.   Skin:     General: Skin is warm and dry.   Neurological:      General: No focal deficit present.      Mental Status: He is alert.   Psychiatric:         Mood and Affect: Mood normal.         Behavior: Behavior normal.         Thought Content: Thought content normal.         Judgment: Judgment normal.           Office Visit on 01/10/2025   Component Date Value Ref Range Status    POC Rapid COVID 01/10/2025 Negative  Negative Final     Acceptable 01/10/2025 Yes   Final    POC Molecular Influenza A Ag 01/10/2025 Positive (A)  Negative Final    POC Molecular Influenza B Ag 01/10/2025 Negative  Negative Final     Acceptable 01/10/2025 Yes   Final   Office Visit on 01/08/2025   Component Date Value Ref Range Status    Creatinine, Urine 01/08/2025 113  23 - 375 mg/dL Final    Microalbumin 01/08/2025 2.5  <=3.0 mg/dL Final    Microalbumin/Creatinine Ratio 01/08/2025 22.1  0.0 - 30.0 mg/g Final    Sodium 01/08/2025 137  136 - 145 mmol/L Final    Potassium 01/08/2025 4.0  3.5 - 5.1 mmol/L Final    Chloride 01/08/2025 105  98 - 107 mmol/L Final    CO2 01/08/2025 23  22 - 29 mmol/L Final    Anion Gap 01/08/2025 13  7 - 16 mmol/L Final    Glucose 01/08/2025 90  74 - 100 mg/dL Final    BUN 01/08/2025 27 (H)  9 - 21 mg/dL Final    Creatinine 01/08/2025 1.15  0.72 - 1.25 mg/dL Final    BUN/Creatinine Ratio 01/08/2025 23 (H)  6 - 20 Final    Calcium 01/08/2025 9.5  8.4 - 10.2 mg/dL Final    eGFR 01/08/2025 78  >=60 mL/min/1.73m2 Final    Hemoglobin A1C 01/08/2025 6.4  <=7.0 % Final      Normal:               <5.7%  Pre-Diabetic:       5.7% to 6.4%  Diabetic:             >6.4%  Diabetic Goal:     <7%    Estimated Average  Glucose 01/08/2025 137  mg/dL Final    Magnesium 01/08/2025 2.1  1.6 - 2.6 mg/dL Final    TSH 01/08/2025 0.620  0.350 - 4.940 uIU/mL Final    WBC 01/08/2025 7.04  4.50 - 11.00 K/uL Final    RBC 01/08/2025 5.26  4.60 - 6.20 M/uL Final    Hemoglobin 01/08/2025 13.8  13.5 - 18.0 g/dL Final    Hematocrit 01/08/2025 44.0  40.0 - 54.0 % Final    MCV 01/08/2025 83.7  80.0 - 96.0 fL Final    MCH 01/08/2025 26.2 (L)  27.0 - 31.0 pg Final    MCHC 01/08/2025 31.4 (L)  32.0 - 36.0 g/dL Final    RDW 01/08/2025 14.9 (H)  11.5 - 14.5 % Final    Platelet Count 01/08/2025 265  150 - 400 K/uL Final    MPV 01/08/2025 11.0  9.4 - 12.4 fL Final    Neutrophils % 01/08/2025 49.3 (L)  53.0 - 65.0 % Final    Lymphocytes % 01/08/2025 41.6 (H)  27.0 - 41.0 % Final    Monocytes % 01/08/2025 5.7  2.0 - 6.0 % Final    Eosinophils % 01/08/2025 2.1  1.0 - 4.0 % Final    Basophils % 01/08/2025 0.9  0.0 - 1.0 % Final    Immature Granulocytes % 01/08/2025 0.4  0.0 - 0.4 % Final    nRBC, Auto 01/08/2025 0.0  <=0.0 % Final    Neutrophils, Abs 01/08/2025 3.47  1.80 - 7.70 K/uL Final    Lymphocytes, Absolute 01/08/2025 2.93  1.00 - 4.80 K/uL Final    Monocytes, Absolute 01/08/2025 0.40  0.00 - 0.80 K/uL Final    Eosinophils, Absolute 01/08/2025 0.15  0.00 - 0.50 K/uL Final    Basophils, Absolute 01/08/2025 0.06  0.00 - 0.20 K/uL Final    Immature Granulocytes, Absolute 01/08/2025 0.03  0.00 - 0.04 K/uL Final    nRBC, Absolute 01/08/2025 0.00  <=0.00 x10e3/uL Final    Diff Type 01/08/2025 Auto   Final             Assessment and Plan (including Health Maintenance)      Problem List  Smart Sets  Document Outside HM   :    Plan:     1. Upper respiratory tract infection, unspecified type  -     POCT COVID-19 Rapid Screening  -     POCT Influenza A/B Molecular    2. Influenza A  -     oseltamivir (TAMIFLU) 75 MG capsule; Take 1 capsule (75 mg total) by mouth 2 (two) times daily. for 5 days  Dispense: 10 capsule; Refill: 0  -     promethazine-dextromethorphan  (PROMETHAZINE-DM) 6.25-15 mg/5 mL Syrp; Take 5 mLs by mouth every 4 (four) hours as needed (cough).  Dispense: 118 mL; Refill: 0    Sample tylenol/motrin combo given in clinic extra sample for 4-6 hours later  Patient tested positive for influenza  Take tamiflu as directed  Tylenol/Motrin as needed as directed for fever/headaches/bodyaches   OTC cough medicine as needed for cough  RTC if symptoms persist or fail to improve  Adequate oral hydration     There are no Patient Instructions on file for this visit.       Health Maintenance Due   Topic Date Due    Eye Exam  07/19/2024    Colorectal Cancer Screening  10/19/2024       Most Recent Immunizations   Administered Date(s) Administered    Pneumococcal Conjugate - 20 Valent 06/19/2023    Tdap 05/20/2024            Health Maintenance Topics with due status: Not Due       Topic Last Completion Date    TETANUS VACCINE 05/20/2024    Lipid Panel 09/05/2024    Low Dose Statin 01/08/2025    Diabetes Urine Screening 01/08/2025    Foot Exam 01/08/2025    Hemoglobin A1c 01/08/2025    RSV Vaccine (Age 60+ and Pregnant patients) Not Due       Future Appointments   Date Time Provider Department Center   1/23/2025  9:00 AM [facility]   1/23/2025 10:00 AM [facility] HOLTER/EKG [facility]   1/27/2025  2:30 PM Artem Babin MD OBC CARD [facility]   2/4/2025  8:30 AM [facility] GI ROOM 02 RASCH ENDO [facility]   3/11/2025 10:20 AM Joao Srivastava, NP RMOBC UROL [facility]   5/8/2025 11:20 AM Charlotte Martinez AUDRA [facility] ZULY Torres   9/16/2025  9:20 AM Charlotte Martinez [facility] ZULY Torres            Signature:  Mary Lee [facility]     Date of encounter: 1/10/25

## 2025-01-17 LAB
LEFT EYE DM RETINOPATHY: NEGATIVE
RIGHT EYE DM RETINOPATHY: NEGATIVE

## 2025-01-23 ENCOUNTER — HOSPITAL ENCOUNTER (OUTPATIENT)
Dept: CARDIOLOGY | Facility: HOSPITAL | Age: 50
Discharge: HOME | End: 2025-01-23
Attending: NURSE PRACTITIONER
Payer: COMMERCIAL

## 2025-01-23 DIAGNOSIS — E11.9 TYPE 2 DIABETES MELLITUS WITHOUT COMPLICATION, WITHOUT LONG-TERM CURRENT USE OF INSULIN: Chronic | ICD-10-CM

## 2025-01-23 DIAGNOSIS — R06.02 SHORTNESS OF BREATH: ICD-10-CM

## 2025-01-23 DIAGNOSIS — R00.2 PALPITATIONS: ICD-10-CM

## 2025-01-23 LAB
AORTIC ROOT ANNULUS: 3.11 CM
AORTIC VALVE CUSP SEPERATION: 2.21 CM
AV INDEX (PROSTH): 0.8
AV MEAN GRADIENT: 4 MMHG
AV PEAK GRADIENT: 6 MMHG
AV VALVE AREA BY VELOCITY RATIO: 2.4 CM²
AV VALVE AREA: 2.5 CM²
AV VELOCITY RATIO: 0.75
CV ECHO LV RWT: 0.41 CM
DOP CALC AO PEAK VEL: 1.2 M/S
DOP CALC AO VTI: 24.1 CM
DOP CALC LVOT AREA: 3.1 CM2
DOP CALC LVOT DIAMETER: 2 CM
DOP CALC LVOT PEAK VEL: 0.9 M/S
DOP CALC LVOT STROKE VOLUME: 60.3 CM3
DOP CALCLVOT PEAK VEL VTI: 19.2 CM
E WAVE DECELERATION TIME: 245 MSEC
E/A RATIO: 1.22
E/E' RATIO: 6 M/S
ECHO LV POSTERIOR WALL: 1 CM (ref 0.6–1.1)
EJECTION FRACTION: 45 %
FRACTIONAL SHORTENING: 22.4 % (ref 28–44)
INTERVENTRICULAR SEPTUM: 1.3 CM (ref 0.6–1.1)
IVC DIAMETER: 2.03 CM
LEFT ATRIUM AREA SYSTOLIC (APICAL 2 CHAMBER): 16.98 CM2
LEFT ATRIUM AREA SYSTOLIC (APICAL 4 CHAMBER): 15.85 CM2
LEFT ATRIUM VOLUME MOD: 44 ML
LEFT INTERNAL DIMENSION IN SYSTOLE: 3.8 CM (ref 2.1–4)
LEFT VENTRICLE DIASTOLIC VOLUME: 112.43 ML
LEFT VENTRICLE END SYSTOLIC VOLUME APICAL 2 CHAMBER: 42.55 ML
LEFT VENTRICLE END SYSTOLIC VOLUME APICAL 4 CHAMBER: 40 ML
LEFT VENTRICLE SYSTOLIC VOLUME: 60.36 ML
LEFT VENTRICULAR INTERNAL DIMENSION IN DIASTOLE: 4.9 CM (ref 3.5–6)
LEFT VENTRICULAR MASS: 213.3 G
LV LATERAL E/E' RATIO: 5.5 M/S
LV SEPTAL E/E' RATIO: 7.7 M/S
LVED V (TEICH): 112.43 ML
LVES V (TEICH): 60.36 ML
LVOT MG: 1.83 MMHG
LVOT MV: 0.65 CM/S
MV PEAK A VEL: 0.63 M/S
MV PEAK E VEL: 0.77 M/S
MV STENOSIS PRESSURE HALF TIME: 71.06 MS
MV VALVE AREA P 1/2 METHOD: 3.1 CM2
OHS CV RV/LV RATIO: 0.78 CM
PISA TR MAX VEL: 1.3 M/S
PV PEAK GRADIENT: 6 MMHG
PV PEAK VELOCITY: 1.19 M/S
RA PRESSURE ESTIMATED: 3 MMHG
RA VOL SYS: 45.61 ML
RIGHT ATRIAL AREA: 17.1 CM2
RIGHT ATRIUM VOLUME AREA LENGTH APICAL 4 CHAMBER: 44.64 ML
RIGHT VENTRICLE DIASTOLIC BASEL DIMENSION: 3.8 CM
RIGHT VENTRICLE DIASTOLIC LENGTH: 7.7 CM
RIGHT VENTRICLE DIASTOLIC MID DIMENSION: 2.2 CM
RIGHT VENTRICULAR LENGTH IN DIASTOLE (APICAL 4-CHAMBER VIEW): 7.72 CM
RV MID DIAMA: 2.18 CM
RV TB RVSP: 4 MMHG
TDI LATERAL: 0.14 M/S
TDI SEPTAL: 0.1 M/S
TDI: 0.12 M/S
TR MAX PG: 7 MMHG
TRICUSPID ANNULAR PLANE SYSTOLIC EXCURSION: 2.09 CM
TV REST PULMONARY ARTERY PRESSURE: 10 MMHG

## 2025-01-23 PROCEDURE — 93010 ELECTROCARDIOGRAM REPORT: CPT | Mod: ,,, | Performed by: INTERNAL MEDICINE

## 2025-01-23 PROCEDURE — 93306 TTE W/DOPPLER COMPLETE: CPT | Mod: 26,,, | Performed by: INTERNAL MEDICINE

## 2025-01-23 PROCEDURE — 93306 TTE W/DOPPLER COMPLETE: CPT

## 2025-01-23 PROCEDURE — 93005 ELECTROCARDIOGRAM TRACING: CPT

## 2025-01-24 LAB
OHS QRS DURATION: 96 MS
OHS QTC CALCULATION: 409 MS

## 2025-01-27 ENCOUNTER — OFFICE VISIT (OUTPATIENT)
Dept: CARDIOLOGY | Facility: CLINIC | Age: 50
End: 2025-01-27
Payer: COMMERCIAL

## 2025-01-27 ENCOUNTER — HOSPITAL ENCOUNTER (OUTPATIENT)
Dept: CARDIOLOGY | Facility: HOSPITAL | Age: 50
Discharge: HOME | End: 2025-01-27
Attending: STUDENT IN AN ORGANIZED HEALTH CARE EDUCATION/TRAINING PROGRAM
Payer: COMMERCIAL

## 2025-01-27 VITALS
HEART RATE: 76 BPM | HEIGHT: 67 IN | DIASTOLIC BLOOD PRESSURE: 82 MMHG | BODY MASS INDEX: 37.04 KG/M2 | RESPIRATION RATE: 18 BRPM | WEIGHT: 236 LBS | SYSTOLIC BLOOD PRESSURE: 120 MMHG

## 2025-01-27 DIAGNOSIS — R00.2 PALPITATIONS: ICD-10-CM

## 2025-01-27 DIAGNOSIS — R06.02 SHORTNESS OF BREATH: ICD-10-CM

## 2025-01-27 DIAGNOSIS — I50.21 ACUTE SYSTOLIC HEART FAILURE: Primary | ICD-10-CM

## 2025-01-27 DIAGNOSIS — E11.9 TYPE 2 DIABETES MELLITUS WITHOUT COMPLICATION, WITHOUT LONG-TERM CURRENT USE OF INSULIN: Chronic | ICD-10-CM

## 2025-01-27 PROCEDURE — 3008F BODY MASS INDEX DOCD: CPT | Mod: CPTII,,, | Performed by: STUDENT IN AN ORGANIZED HEALTH CARE EDUCATION/TRAINING PROGRAM

## 2025-01-27 PROCEDURE — 3061F NEG MICROALBUMINURIA REV: CPT | Mod: CPTII,,, | Performed by: STUDENT IN AN ORGANIZED HEALTH CARE EDUCATION/TRAINING PROGRAM

## 2025-01-27 PROCEDURE — 3066F NEPHROPATHY DOC TX: CPT | Mod: CPTII,,, | Performed by: STUDENT IN AN ORGANIZED HEALTH CARE EDUCATION/TRAINING PROGRAM

## 2025-01-27 PROCEDURE — 3079F DIAST BP 80-89 MM HG: CPT | Mod: CPTII,,, | Performed by: STUDENT IN AN ORGANIZED HEALTH CARE EDUCATION/TRAINING PROGRAM

## 2025-01-27 PROCEDURE — 3074F SYST BP LT 130 MM HG: CPT | Mod: CPTII,,, | Performed by: STUDENT IN AN ORGANIZED HEALTH CARE EDUCATION/TRAINING PROGRAM

## 2025-01-27 PROCEDURE — 3044F HG A1C LEVEL LT 7.0%: CPT | Mod: CPTII,,, | Performed by: STUDENT IN AN ORGANIZED HEALTH CARE EDUCATION/TRAINING PROGRAM

## 2025-01-27 PROCEDURE — 4010F ACE/ARB THERAPY RXD/TAKEN: CPT | Mod: CPTII,,, | Performed by: STUDENT IN AN ORGANIZED HEALTH CARE EDUCATION/TRAINING PROGRAM

## 2025-01-27 PROCEDURE — 93246 EXT ECG>7D<15D RECORDING: CPT

## 2025-01-27 PROCEDURE — 99204 OFFICE O/P NEW MOD 45 MIN: CPT | Mod: S$PBB,,, | Performed by: STUDENT IN AN ORGANIZED HEALTH CARE EDUCATION/TRAINING PROGRAM

## 2025-01-27 PROCEDURE — 99214 OFFICE O/P EST MOD 30 MIN: CPT | Mod: PBBFAC,25 | Performed by: STUDENT IN AN ORGANIZED HEALTH CARE EDUCATION/TRAINING PROGRAM

## 2025-01-27 PROCEDURE — 99999 PR PBB SHADOW E&M-EST. PATIENT-LVL IV: CPT | Mod: PBBFAC,,, | Performed by: STUDENT IN AN ORGANIZED HEALTH CARE EDUCATION/TRAINING PROGRAM

## 2025-01-27 RX ORDER — NAPROXEN SODIUM 220 MG/1
81 TABLET, FILM COATED ORAL DAILY
Qty: 90 TABLET | Refills: 3 | Status: SHIPPED | OUTPATIENT
Start: 2025-01-27 | End: 2026-01-27

## 2025-01-27 RX ORDER — METOPROLOL SUCCINATE 25 MG/1
25 TABLET, EXTENDED RELEASE ORAL DAILY
Qty: 90 TABLET | Refills: 3 | Status: ON HOLD | OUTPATIENT
Start: 2025-01-27 | End: 2025-02-07

## 2025-01-27 NOTE — PATIENT INSTRUCTIONS
Ziopatch  Plan for heart catheterization  Start aspirin 81mg once a day  Start metoprolol xl 25mg once a day

## 2025-01-27 NOTE — ASSESSMENT & PLAN NOTE
NYHA I, Stage B  EKG with ischemic changes -plan for [facility]  GDMT - start metop xl 25mg qd, lisinopril 20mg qd  Device none

## 2025-01-27 NOTE — PROGRESS NOTES
PCP: Charlotte Martinez FNP    Referring Provider:     Subjective:   Russel Hill is a 49 y.o. male with hx of DM, HTN, HLD  who presents for evaluation of palpitations.    Patient reports daily episodes of palpitations. 3-4 epi/day and last 2-3 mins. No dizziness or syncope.   Report vague chest pain and SOB.  He had a recent EKG that has concerning ischemic changes and an ECHO with an EF of 40-45%.       Fhx: mother- CVA  Shx: Denies smoking, etoh or drug use    EKG - 1/23/25 - NSR, LVH, Lateral HEAVEN with inferior ST depression.     ECHO - Results for orders placed during the hospital encounter of 01/23/25    Echo    Interpretation Summary    Left Ventricle: The left ventricle is normal in size. There is mild concentric hypertrophy. There is mildly reduced systolic function with a visually estimated ejection fraction of 40 - 50%. Ejection fraction is approximately 45%. There is normal diastolic function.    Right Ventricle: Normal right ventricular cavity size. Systolic function is normal.    Left Atrium: Left atrium is dilated.    IVC/SVC: Normal venous pressure at 3 mmHg.          Lab Results   Component Value Date     01/08/2025    K 4.0 01/08/2025     01/08/2025    CO2 23 01/08/2025    BUN 27 (H) 01/08/2025    CREATININE 1.15 01/08/2025    CALCIUM 9.5 01/08/2025    ANIONGAP 13 01/08/2025    ESTGFRAFRICA 113 05/12/2021    EGFRNONAA 69 06/16/2022       Lab Results   Component Value Date    CHOL 160 09/05/2024    CHOL 170 05/20/2024    CHOL 148 06/19/2023     Lab Results   Component Value Date    HDL 60 09/05/2024    HDL 65 (H) 05/20/2024    HDL 76 (H) 06/19/2023     Lab Results   Component Value Date    LDLCALC 70 09/05/2024    LDLCALC 73 05/20/2024    LDLCALC 58 06/19/2023     Lab Results   Component Value Date    TRIG 150 09/05/2024    TRIG 162 (H) 05/20/2024    TRIG 71 06/19/2023     Lab Results   Component Value Date    CHOLHDL 2.7 09/05/2024    CHOLHDL 2.6 05/20/2024    CHOLHDL 1.9  "06/19/2023       Lab Results   Component Value Date    WBC 7.04 01/08/2025    HGB 13.8 01/08/2025    HCT 44.0 01/08/2025    MCV 83.7 01/08/2025     01/08/2025           Current Outpatient Medications:     aspirin 81 MG Chew, Take 1 tablet (81 mg total) by mouth once daily., Disp: 90 tablet, Rfl: 3    cloNIDine (CATAPRES) 0.2 MG tablet, Take 1 tablet (0.2 mg total) by mouth every evening., Disp: 90 tablet, Rfl: 3    linaCLOtide (LINZESS) 145 mcg Cap capsule, Take 1 capsule (145 mcg total) by mouth before breakfast., Disp: 90 capsule, Rfl: 3    lisinopriL (PRINIVIL,ZESTRIL) 20 MG tablet, Take 1 tablet (20 mg total) by mouth once daily., Disp: 90 tablet, Rfl: 3    metFORMIN (GLUCOPHAGE) 500 MG tablet, Take 1 tablet (500 mg total) by mouth daily with breakfast., Disp: 90 tablet, Rfl: 3    metoprolol succinate (TOPROL-XL) 25 MG 24 hr tablet, Take 1 tablet (25 mg total) by mouth once daily., Disp: 90 tablet, Rfl: 3    NIFEdipine (ADALAT CC) 60 MG TbSR, Take 1 tablet (60 mg total) by mouth once daily., Disp: 90 tablet, Rfl: 3    potassium chloride SA (K-DUR,KLOR-CON) 20 MEQ tablet, Take 1 tablet (20 mEq total) by mouth once daily., Disp: 90 tablet, Rfl: 3    pravastatin (PRAVACHOL) 20 MG tablet, Take 1 tablet (20 mg total) by mouth once daily., Disp: 90 tablet, Rfl: 3    semaglutide (OZEMPIC) 2 mg/dose (8 mg/3 mL) PnIj, Inject 2 mg into the skin every 7 days., Disp: 9 mL, Rfl: 3    tamsulosin (FLOMAX) 0.4 mg Cap, Take one capsule every night, Disp: 90 capsule, Rfl: 3    triamterene-hydrochlorothiazide 37.5-25 mg (DYAZIDE) 37.5-25 mg per capsule, Take 1 capsule by mouth once daily., Disp: 90 capsule, Rfl: 3    Review of Systems   Respiratory:  Negative for cough and shortness of breath.    Cardiovascular:  Positive for palpitations. Negative for chest pain, orthopnea, claudication, leg swelling and PND.         Objective:   /82   Pulse 76   Resp 18   Ht 5' 7" (1.702 m)   Wt 107 kg (236 lb)   BMI 36.96 " kg/m²     Physical Exam  Vitals and nursing note reviewed.   Constitutional:       Appearance: Normal appearance.   Cardiovascular:      Rate and Rhythm: Normal rate and regular rhythm.      Pulses: Normal pulses.      Heart sounds: Normal heart sounds.   Pulmonary:      Breath sounds: Normal breath sounds.   Neurological:      Mental Status: He is alert and oriented to person, place, and time.           Assessment:     1. Acute systolic heart failure        2. Type 2 diabetes mellitus without complication, without long-term current use of insulin  Ambulatory referral/consult to Cardiology      3. Shortness of breath  Ambulatory referral/consult to Cardiology      4. Palpitations  Ambulatory referral/consult to Cardiology    Cardiac Monitor - 3-15 Day Adult (Cupid Only)            Plan:   Acute systolic heart failure  NYHA I, Stage B  EKG with ischemic changes -plan for [facility]  GDMT - start metop xl 25mg qd, lisinopril 20mg qd  Device none    Palpitations  Frequent   Tsh normal  Ziopatch

## 2025-01-27 NOTE — PROGRESS NOTES
Call patient and review results. Abnormal Echo with mildly decreased EF.  Keep appt 1/27/25 2:30 with Dr. Babin.

## 2025-01-27 NOTE — H&P (VIEW-ONLY)
PCP: Charlotte Martinez FNP    Referring Provider:     Subjective:   Russel Hill is a 49 y.o. male with hx of DM, HTN, HLD  who presents for evaluation of palpitations.    Patient reports daily episodes of palpitations. 3-4 epi/day and last 2-3 mins. No dizziness or syncope.   Report vague chest pain and SOB.  He had a recent EKG that has concerning ischemic changes and an ECHO with an EF of 40-45%.       Fhx: mother- CVA  Shx: Denies smoking, etoh or drug use    EKG - 1/23/25 - NSR, LVH, Lateral HEAVEN with inferior ST depression.     ECHO - Results for orders placed during the hospital encounter of 01/23/25    Echo    Interpretation Summary    Left Ventricle: The left ventricle is normal in size. There is mild concentric hypertrophy. There is mildly reduced systolic function with a visually estimated ejection fraction of 40 - 50%. Ejection fraction is approximately 45%. There is normal diastolic function.    Right Ventricle: Normal right ventricular cavity size. Systolic function is normal.    Left Atrium: Left atrium is dilated.    IVC/SVC: Normal venous pressure at 3 mmHg.          Lab Results   Component Value Date     01/08/2025    K 4.0 01/08/2025     01/08/2025    CO2 23 01/08/2025    BUN 27 (H) 01/08/2025    CREATININE 1.15 01/08/2025    CALCIUM 9.5 01/08/2025    ANIONGAP 13 01/08/2025    ESTGFRAFRICA 113 05/12/2021    EGFRNONAA 69 06/16/2022       Lab Results   Component Value Date    CHOL 160 09/05/2024    CHOL 170 05/20/2024    CHOL 148 06/19/2023     Lab Results   Component Value Date    HDL 60 09/05/2024    HDL 65 (H) 05/20/2024    HDL 76 (H) 06/19/2023     Lab Results   Component Value Date    LDLCALC 70 09/05/2024    LDLCALC 73 05/20/2024    LDLCALC 58 06/19/2023     Lab Results   Component Value Date    TRIG 150 09/05/2024    TRIG 162 (H) 05/20/2024    TRIG 71 06/19/2023     Lab Results   Component Value Date    CHOLHDL 2.7 09/05/2024    CHOLHDL 2.6 05/20/2024    CHOLHDL 1.9  "06/19/2023       Lab Results   Component Value Date    WBC 7.04 01/08/2025    HGB 13.8 01/08/2025    HCT 44.0 01/08/2025    MCV 83.7 01/08/2025     01/08/2025           Current Outpatient Medications:     aspirin 81 MG Chew, Take 1 tablet (81 mg total) by mouth once daily., Disp: 90 tablet, Rfl: 3    cloNIDine (CATAPRES) 0.2 MG tablet, Take 1 tablet (0.2 mg total) by mouth every evening., Disp: 90 tablet, Rfl: 3    linaCLOtide (LINZESS) 145 mcg Cap capsule, Take 1 capsule (145 mcg total) by mouth before breakfast., Disp: 90 capsule, Rfl: 3    lisinopriL (PRINIVIL,ZESTRIL) 20 MG tablet, Take 1 tablet (20 mg total) by mouth once daily., Disp: 90 tablet, Rfl: 3    metFORMIN (GLUCOPHAGE) 500 MG tablet, Take 1 tablet (500 mg total) by mouth daily with breakfast., Disp: 90 tablet, Rfl: 3    metoprolol succinate (TOPROL-XL) 25 MG 24 hr tablet, Take 1 tablet (25 mg total) by mouth once daily., Disp: 90 tablet, Rfl: 3    NIFEdipine (ADALAT CC) 60 MG TbSR, Take 1 tablet (60 mg total) by mouth once daily., Disp: 90 tablet, Rfl: 3    potassium chloride SA (K-DUR,KLOR-CON) 20 MEQ tablet, Take 1 tablet (20 mEq total) by mouth once daily., Disp: 90 tablet, Rfl: 3    pravastatin (PRAVACHOL) 20 MG tablet, Take 1 tablet (20 mg total) by mouth once daily., Disp: 90 tablet, Rfl: 3    semaglutide (OZEMPIC) 2 mg/dose (8 mg/3 mL) PnIj, Inject 2 mg into the skin every 7 days., Disp: 9 mL, Rfl: 3    tamsulosin (FLOMAX) 0.4 mg Cap, Take one capsule every night, Disp: 90 capsule, Rfl: 3    triamterene-hydrochlorothiazide 37.5-25 mg (DYAZIDE) 37.5-25 mg per capsule, Take 1 capsule by mouth once daily., Disp: 90 capsule, Rfl: 3    Review of Systems   Respiratory:  Negative for cough and shortness of breath.    Cardiovascular:  Positive for palpitations. Negative for chest pain, orthopnea, claudication, leg swelling and PND.         Objective:   /82   Pulse 76   Resp 18   Ht 5' 7" (1.702 m)   Wt 107 kg (236 lb)   BMI 36.96 " kg/m²     Physical Exam  Vitals and nursing note reviewed.   Constitutional:       Appearance: Normal appearance.   Cardiovascular:      Rate and Rhythm: Normal rate and regular rhythm.      Pulses: Normal pulses.      Heart sounds: Normal heart sounds.   Pulmonary:      Breath sounds: Normal breath sounds.   Neurological:      Mental Status: He is alert and oriented to person, place, and time.           Assessment:     1. Acute systolic heart failure        2. Type 2 diabetes mellitus without complication, without long-term current use of insulin  Ambulatory referral/consult to Cardiology      3. Shortness of breath  Ambulatory referral/consult to Cardiology      4. Palpitations  Ambulatory referral/consult to Cardiology    Cardiac Monitor - 3-15 Day Adult (Cupid Only)            Plan:   Acute systolic heart failure  NYHA I, Stage B  EKG with ischemic changes -plan for [facility]  GDMT - start metop xl 25mg qd, lisinopril 20mg qd  Device none    Palpitations  Frequent   Tsh normal  Ziopatch

## 2025-01-28 DIAGNOSIS — R00.2 PALPITATIONS: ICD-10-CM

## 2025-01-28 DIAGNOSIS — R94.31 NONSPECIFIC ABNORMAL ELECTROCARDIOGRAM (ECG) (EKG): Primary | ICD-10-CM

## 2025-01-28 RX ORDER — SODIUM CHLORIDE 0.9 % (FLUSH) 0.9 %
2 SYRINGE (ML) INJECTION
OUTPATIENT
Start: 2025-02-07

## 2025-02-04 ENCOUNTER — HOSPITAL ENCOUNTER (OUTPATIENT)
Dept: GASTROENTEROLOGY | Facility: HOSPITAL | Age: 50
Discharge: HOME | End: 2025-02-04
Attending: NURSE PRACTITIONER | Admitting: INTERNAL MEDICINE
Payer: COMMERCIAL

## 2025-02-04 ENCOUNTER — ANESTHESIA EVENT (OUTPATIENT)
Dept: GASTROENTEROLOGY | Facility: HOSPITAL | Age: 50
End: 2025-02-04
Payer: COMMERCIAL

## 2025-02-04 ENCOUNTER — ANESTHESIA (OUTPATIENT)
Dept: GASTROENTEROLOGY | Facility: HOSPITAL | Age: 50
End: 2025-02-04
Payer: COMMERCIAL

## 2025-02-04 VITALS
HEIGHT: 67 IN | TEMPERATURE: 98 F | DIASTOLIC BLOOD PRESSURE: 54 MMHG | BODY MASS INDEX: 35.31 KG/M2 | WEIGHT: 225 LBS | HEART RATE: 72 BPM | RESPIRATION RATE: 16 BRPM | OXYGEN SATURATION: 98 % | SYSTOLIC BLOOD PRESSURE: 101 MMHG

## 2025-02-04 DIAGNOSIS — C18.9 MALIGNANT NEOPLASM OF COLON, UNSPECIFIED PART OF COLON: ICD-10-CM

## 2025-02-04 DIAGNOSIS — K62.1 POLYP OF RECTUM: ICD-10-CM

## 2025-02-04 DIAGNOSIS — Z90.49 HISTORY OF PARTIAL COLECTOMY: ICD-10-CM

## 2025-02-04 DIAGNOSIS — Z85.038 HISTORY OF COLON CANCER, STAGE I: Primary | ICD-10-CM

## 2025-02-04 LAB — GLUCOSE SERPL-MCNC: 85 MG/DL (ref 70–105)

## 2025-02-04 PROCEDURE — 88305 TISSUE EXAM BY PATHOLOGIST: CPT | Mod: TC,SUR | Performed by: INTERNAL MEDICINE

## 2025-02-04 PROCEDURE — 25000003 PHARM REV CODE 250: Performed by: NURSE ANESTHETIST, CERTIFIED REGISTERED

## 2025-02-04 PROCEDURE — D9220A PRA ANESTHESIA: Mod: ,,, | Performed by: NURSE ANESTHETIST, CERTIFIED REGISTERED

## 2025-02-04 PROCEDURE — 27201423 OPTIME MED/SURG SUP & DEVICES STERILE SUPPLY

## 2025-02-04 PROCEDURE — 45380 COLONOSCOPY AND BIOPSY: CPT | Mod: ,,, | Performed by: INTERNAL MEDICINE

## 2025-02-04 PROCEDURE — 45380 COLONOSCOPY AND BIOPSY: CPT | Performed by: INTERNAL MEDICINE

## 2025-02-04 PROCEDURE — 37000009 HC ANESTHESIA EA ADD 15 MINS

## 2025-02-04 PROCEDURE — 37000008 HC ANESTHESIA 1ST 15 MINUTES

## 2025-02-04 PROCEDURE — 82962 GLUCOSE BLOOD TEST: CPT

## 2025-02-04 PROCEDURE — 88305 TISSUE EXAM BY PATHOLOGIST: CPT | Mod: 26,,, | Performed by: PATHOLOGY

## 2025-02-04 PROCEDURE — 63600175 PHARM REV CODE 636 W HCPCS: Performed by: NURSE ANESTHETIST, CERTIFIED REGISTERED

## 2025-02-04 RX ORDER — PHENYLEPHRINE HYDROCHLORIDE 10 MG/ML
INJECTION INTRAVENOUS
Status: DISCONTINUED | OUTPATIENT
Start: 2025-02-04 | End: 2025-02-04

## 2025-02-04 RX ORDER — SODIUM CHLORIDE, SODIUM LACTATE, POTASSIUM CHLORIDE, CALCIUM CHLORIDE 600; 310; 30; 20 MG/100ML; MG/100ML; MG/100ML; MG/100ML
INJECTION, SOLUTION INTRAVENOUS CONTINUOUS
Status: DISCONTINUED | OUTPATIENT
Start: 2025-02-04 | End: 2025-02-05 | Stop reason: HOSPADM

## 2025-02-04 RX ORDER — LIDOCAINE HYDROCHLORIDE 20 MG/ML
INJECTION, SOLUTION EPIDURAL; INFILTRATION; INTRACAUDAL; PERINEURAL
Status: DISCONTINUED | OUTPATIENT
Start: 2025-02-04 | End: 2025-02-04

## 2025-02-04 RX ORDER — PROPOFOL 10 MG/ML
VIAL (ML) INTRAVENOUS
Status: DISCONTINUED | OUTPATIENT
Start: 2025-02-04 | End: 2025-02-04

## 2025-02-04 RX ORDER — ETOMIDATE 2 MG/ML
INJECTION INTRAVENOUS
Status: DISCONTINUED | OUTPATIENT
Start: 2025-02-04 | End: 2025-02-04

## 2025-02-04 RX ORDER — SODIUM CHLORIDE 0.9 % (FLUSH) 0.9 %
10 SYRINGE (ML) INJECTION EVERY 6 HOURS PRN
Status: DISCONTINUED | OUTPATIENT
Start: 2025-02-04 | End: 2025-02-05 | Stop reason: HOSPADM

## 2025-02-04 RX ADMIN — PROPOFOL 30 MG: 10 INJECTION, EMULSION INTRAVENOUS at 09:02

## 2025-02-04 RX ADMIN — ETOMIDATE 4 MG: 2 INJECTION INTRAVENOUS at 09:02

## 2025-02-04 RX ADMIN — ETOMIDATE 2 MG: 2 INJECTION INTRAVENOUS at 09:02

## 2025-02-04 RX ADMIN — PHENYLEPHRINE HYDROCHLORIDE 100 MCG: 10 INJECTION INTRAVENOUS at 10:02

## 2025-02-04 RX ADMIN — PROPOFOL 80 MG: 10 INJECTION, EMULSION INTRAVENOUS at 09:02

## 2025-02-04 RX ADMIN — LIDOCAINE HYDROCHLORIDE 80 MG: 20 INJECTION, SOLUTION EPIDURAL; INFILTRATION; INTRACAUDAL; PERINEURAL at 09:02

## 2025-02-04 NOTE — TRANSFER OF CARE
"Anesthesia Transfer of Care Note    Patient: Russel Hill    Procedure(s) Performed: * No procedures listed *    Patient location: GI    Anesthesia Type: general    Transport from OR: Transported from OR on room air with adequate spontaneous ventilation    Post pain: adequate analgesia    Post assessment: no apparent anesthetic complications    Post vital signs: stable    Level of consciousness: responds to stimulation and awake    Nausea/Vomiting: no nausea/vomiting    Complications: none    Transfer of care protocol was followed      Last vitals: Visit Vitals  /82 (BP Location: Right arm, Patient Position: Lying)   Pulse 76   Temp 36.5 °C (97.7 °F) (Oral)   Resp 12   Ht 5' 7" (1.702 m)   Wt 102.1 kg (225 lb)   SpO2 97%   BMI 35.24 kg/m²     " PROGRESS NOTE    PCP: Pilar Don MD  Referring Provider: No ref. provider found    IMPRESSION:   1. Eleno Cash the lung T1 N2 or stage IIIa diagnosed February 2018, treated with concurrent chemoradiation  2.  Active tobacco user, likely COPD  3.  Active alcohol user  4.  Anxiety       INTERVAL HISTORY:   PET scan was accomplished and does show uptake in the new lesion in the lung as well as mediastinal nodes, no uptake below the diaphragm was identified. This would suggest early progression of disease and would negate the adjuvant immunotherapy that had been scheduled. Options for treatment would depend on her first excision of tumor. We did attempt to send for Foundation 1 PDL 1 studies. Her diagnosis was made at bronchoscopy and endobronchial ultrasound sampling. Size of the sample was small and there was not sufficient material to send for these studies. Therefore less we do a repeat biopsy we do not know her genomic or immune status of her tumor. We discussed the PET scan with the patient and also discuss the lack of availability of genomic in PDL 1 studies. She was not interested in doing a repeat biopsy. Options first would be chemotherapy either carbo Alimta and Avastin or Carbo and Alimta Keytruda. More recent study is suggested Zahira Alimta Tecentriq and Avastin may be helpful. Second option would be observation monitoring and reevaluating every 6-8 weeks in seeing the tumors growing and the patient which is growing and what symptoms she has. Third option would be getting a second opinion at a tertiary care center. Apparently the patient's  has recently been to [address] [place] [address] clinic. Discussed the pros and cons of all these approaches. Since the treatments are not curative since she is not having symptoms since the treatments may cause symptoms she felt that observation and monitoring would make the most sense for her.     Therefore the plan will be to recheck her scans in 6 or 7 weeks and reevaluate her after that. If she develops new symptoms or if she were to change her mind we could see her sooner. Discussion today with the patient and her daughter answered both the patient and daughter's questions. PLAN:     1. Discontinue adjuvant immunotherapy due to progression of disease  2. Repeat CT scan in 6-7 weeks to assess progress of recurrent tumor  3. Appointment to be seen in roughly 2 months    Return in about 2 months (around 9/3/2018). VISIT DIAGNOSIS:  The encounter diagnosis was Malignant neoplasm of lower lobe of right lung ([address] PAST MEDICAL HISTORY:      Diagnosis Date    Anxiety     Cancer [facility]) 2018    right lung CA    Pneumonia 2017    hopitalized 5 days    Wears dentures     upper and lower    Wears glasses        PAST SURGICAL HISTORY:      Procedure Laterality Date    APPENDECTOMY       SECTION, LOW TRANSVERSE      x 2    ENDOSCOPY, COLON, DIAGNOSTIC         CURRENT MEDICATIONS:   Current Outpatient Prescriptions   Medication Sig Dispense Refill    omeprazole (PRILOSEC) 40 MG delayed release capsule       Magic Mouthwash (MIRACLE MOUTHWASH) Swish and spit 5 mLs 4 times daily as needed for Irritation      DULoxetine (CYMBALTA) 60 MG extended release capsule Take 1 capsule by mouth daily 30 capsule 3    prochlorperazine (COMPAZINE) 10 MG tablet Take 1 tablet by mouth every 6 hours as needed (nausea) 120 tablet 3    ondansetron (ZOFRAN-ODT) 8 MG disintegrating tablet Take 1 tablet by mouth every 8 hours as needed for Nausea or Vomiting 60 tablet 3    ondansetron (ZOFRAN-ODT) 8 MG disintegrating tablet Take 1 tablet by mouth every 8 hours as needed for Nausea or Vomiting 90 tablet 1    prochlorperazine (COMPAZINE) 10 MG tablet Take 1 tablet by mouth every 6 hours as needed (nausea) 120 tablet 3    lidocaine-prilocaine (EMLA) 2.5-2.5 % cream Apply topically as needed.  Apply a quarter size amount to the port site one hour Electronically signed by Delores Anderson MD on 7/3/2018 at 10:27 AM

## 2025-02-04 NOTE — LETTER
February 4, 2025      [facility]  [address] [place] [address]  Phone: [phone]  Fax: [phone]         Patient: Russel Hill   YOB: 1975  Date of Visit: 02/04/2025    To Whom It May Concern:    Asher Hill  was at [facility] on 02/04/2025. The patient may return to work/school on 2/05/95.   If you have any questions or concerns, or if I can be of further assistance, please do not hesitate to contact me.      Sincerely,        Amarjit Dee RN

## 2025-02-04 NOTE — ANESTHESIA POSTPROCEDURE EVALUATION
Anesthesia Post Evaluation    Patient: Russel Hill    Procedure(s) Performed: * No procedures listed *    Final Anesthesia Type: general      Patient location during evaluation: GI PACU  Patient participation: Yes- Able to Participate  Level of consciousness: awake and alert  Post-procedure vital signs: reviewed and stable  Pain management: adequate  Airway patency: patent    PONV status at discharge: No PONV  Anesthetic complications: no      Cardiovascular status: blood pressure returned to baseline and hemodynamically stable  Respiratory status: spontaneous ventilation  Hydration status: euvolemic  Follow-up not needed.              Vitals Value Taken Time   BP 96/52 02/04/25 1019   Temp 36.5 °C (97.7 °F) 02/04/25 1010   Pulse 67 02/04/25 1021   Resp 17 02/04/25 1021   SpO2 98 % 02/04/25 1021   Vitals shown include unfiled device data.      No case tracking events are documented in the log.      Pain/Carmen Score: Carmen Score: 8 (2/4/2025 10:11 AM)

## 2025-02-04 NOTE — H&P
[facility]  Gastroenterology  H&P    Patient Name: Russel Hill  MRN: [id number]  Admission Date: 2/4/2025  Code Status: Prior    Attending Provider: Charlotte Martinez FNP   Primary Care Physician: Charlotte aMrtinez FNP  Principal Problem:<principal problem not specified>    Subjective:     History of Present Illness:  This patient is a 49-year-old man with personal history of stage I colon cancer of the right colon he has undergone a right hemicolectomy and he presents for surveillance colonoscopy.    Past Medical History:   Diagnosis Date    Erectile dysfunction     Hypercholesteremia 03/04/2016    Primary hypertension     Type 2 diabetes mellitus without complication, without long-term current use of insulin        Past Surgical History:   Procedure Laterality Date    CIRCUMCISION  1996    COLON RESECTION Right 11/16/2023    Procedure: COLON RESECTION;  Surgeon: Boby Godwin MD;  Location: [facility];  Service: General;  Laterality: Right;       Review of patient's allergies indicates:   Allergen Reactions    Marijuana/cannabinoid      seizure    Melon      watermellon     Family History       Problem Relation (Age of Onset)    Cancer Mother, Maternal Grandmother, Maternal Grandfather, Paternal Grandmother, Paternal Grandfather    Diabetes Mother, Father, Brother, Maternal Grandmother, Maternal Grandfather, Paternal Grandmother, Paternal Grandfather    Heart disease Brother    Heart failure Brother, Brother    Hypertension Mother, Father, Sister, Brother, Brother, Maternal Grandmother, Maternal Grandfather, Paternal Grandmother, Paternal Grandfather    Kidney failure Brother    No Known Problems Daughter, Daughter, Daughter, Daughter, Son, Son          Tobacco Use    Smoking status: Never    Smokeless tobacco: Never   Substance and Sexual Activity    Alcohol use: Not Currently     Comment: drinks some liquor one day each weekend    Drug use: Not Currently     Comment: marijuana caused a  seizure    Sexual activity: Yes     Partners: Female     Birth control/protection: None     Review of Systems   Constitutional: Negative.    HENT: Negative.     Respiratory: Negative.     Cardiovascular: Negative.    Gastrointestinal: Negative.      Objective:     Vital Signs (Most Recent):  Temp: 98 °F (36.7 °C) (02/04/25 0918)  Pulse: 76 (02/04/25 0918)  Resp: 12 (02/04/25 0918)  BP: 123/82 (02/04/25 0918)  SpO2: 99 % (02/04/25 0918) Vital Signs (24h Range):  Temp:  [98 °F (36.7 °C)] 98 °F (36.7 °C)  Pulse:  [76] 76  Resp:  [12] 12  SpO2:  [99 %] 99 %  BP: (123)/(82) 123/82     Weight: 102.1 kg (225 lb) (02/04/25 0918)  Body mass index is 35.24 kg/m².    No intake or output data in the 24 hours ending 02/04/25 0941    Lines/Drains/Airways       Peripheral Intravenous Line  Duration                  Peripheral IV - Single Lumen 02/04/25 0918 22 G Left;Posterior Hand <1 day                    Physical Exam  Vitals reviewed.   Constitutional:       General: He is not in acute distress.     Appearance: Normal appearance. He is well-developed. He is not ill-appearing.   HENT:      Head: Normocephalic and atraumatic.      Nose: Nose normal.   Eyes:      Pupils: Pupils are equal, round, and reactive to light.   Cardiovascular:      Rate and Rhythm: Normal rate and regular rhythm.   Pulmonary:      Effort: Pulmonary effort is normal.      Breath sounds: Normal breath sounds. No wheezing.   Abdominal:      General: Abdomen is flat. Bowel sounds are normal. There is no distension.      Palpations: Abdomen is soft.      Tenderness: There is no abdominal tenderness. There is no guarding.   Skin:     General: Skin is warm and dry.      Coloration: Skin is not jaundiced.   Neurological:      Mental Status: He is alert.   Psychiatric:         Attention and Perception: Attention normal.         Mood and Affect: Affect normal.         Speech: Speech normal.         Behavior: Behavior is cooperative.      Comments: Pt was calm  "while speaking.         Significant Labs:  CBC: No results for input(s): "WBC", "HGB", "HCT", "PLT" in the last 48 hours.  CMP: No results for input(s): "GLU", "CALCIUM", "ALBUMIN", "PROT", "NA", "K", "CO2", "CL", "BUN", "CREATININE", "ALKPHOS", "ALT", "AST", "BILITOT" in the last 48 hours.    Significant Imaging:  Imaging results within the past 24 hours have been reviewed.    Assessment/Plan:     There are no hospital problems to display for this patient.        Impression: History of colon cancer  Plan: Colonoscopy today    Aldo Ruiz MD  Gastroenterology  [facility]  "

## 2025-02-04 NOTE — DISCHARGE INSTRUCTIONS
Procedure Date  2/4/25     Impression  Overall Impression:   Polyp in the rectum; performed cold forceps biopsy  Digital rectal exam revealed no mass.  The colon was examined from the rectum to the anastomosis and changes of right hemicolectomy are present.  Two diminutive polyps were removed with biopsy from the rectum.  Impression:  History of colon cancer, status post right hemicolectomy, 2 diminutive rectal polyps were removed during this exam.     Recommendation  Await pathology results, due: 2/6/2025   Repeat colonoscopy in 2 years      Disposition: Discharge patient to home in stable condition.  High-fiber diet.  No driving until tomorrow.  Follow up pathology results in 2 days.  Follow up with PCP as scheduled, return to GI clinic p.r.n..     Indication  Malignant neoplasm of colon, unspecified part of colon, history of colon cancer of the ascending colon.

## 2025-02-04 NOTE — ANESTHESIA PREPROCEDURE EVALUATION
02/04/2025  Russel Hill is a 49 y.o., male.      Pre-op Assessment    I have reviewed the Patient Summary Reports.     I have reviewed the Nursing Notes. I have reviewed the NPO Status.   I have reviewed the Medications.     Review of Systems  Anesthesia Hx:  No problems with previous Anesthesia                Social:  No Alcohol Use, Non-Smoker       Cardiovascular:     Hypertension    Dysrhythmias                                      Pulmonary:      Shortness of breath                  Renal/:  Chronic Renal Disease                Hepatic/GI:  Bowel Prep.                   Endocrine:  Diabetes, type 2         Obesity / BMI > 30      Physical Exam  General: Well nourished, Cooperative, Alert and Oriented    Airway:  Mallampati: II   Mouth Opening: Normal  Neck ROM: Normal ROM    Dental:  Dentures    Chest/Lungs:  Normal Respiratory Rate    Heart:  Rhythm: Regular Rhythm        Anesthesia Plan  Type of Anesthesia, risks & benefits discussed:    Anesthesia Type: Gen Natural Airway, MAC  Intra-op Monitoring Plan: Standard ASA Monitors  Post Op Pain Control Plan: multimodal analgesia  Induction:  IV  Informed Consent: Informed consent signed with the Patient and all parties understand the risks and agree with anesthesia plan.  All questions answered.   ASA Score: 3  Day of Surgery Review of History & Physical: H&P Update referred to the surgeon/provider.I have interviewed and examined the patient. I have reviewed the patient's H&P dated:     Ready For Surgery From Anesthesia Perspective.     .

## 2025-02-05 ENCOUNTER — TELEPHONE (OUTPATIENT)
Dept: GASTROENTEROLOGY | Facility: CLINIC | Age: 50
End: 2025-02-05
Payer: COMMERCIAL

## 2025-02-05 LAB
ESTROGEN SERPL-MCNC: NORMAL PG/ML
INSULIN SERPL-ACNC: NORMAL U[IU]/ML
LAB AP GROSS DESCRIPTION: NORMAL
LAB AP LABORATORY NOTES: NORMAL
T3RU NFR SERPL: NORMAL %

## 2025-02-05 NOTE — PROGRESS NOTES
This colon polyp was hyperplastic.  Recommendation: Repeat colonoscopy in 2 years due to history of colon cancer.

## 2025-02-05 NOTE — TELEPHONE ENCOUNTER
Results and recommendations called to patient. Verbalized understanding. 2 year recall placed on chart.              ----- Message from Aldo Ruiz MD sent at 2/5/2025  9:37 AM CST -----  This colon polyp was hyperplastic.  Recommendation: Repeat colonoscopy in 2 years due to history of colon cancer.

## 2025-02-07 ENCOUNTER — HOSPITAL ENCOUNTER (OUTPATIENT)
Facility: HOSPITAL | Age: 50
Discharge: HOME | End: 2025-02-07
Attending: STUDENT IN AN ORGANIZED HEALTH CARE EDUCATION/TRAINING PROGRAM | Admitting: STUDENT IN AN ORGANIZED HEALTH CARE EDUCATION/TRAINING PROGRAM
Payer: COMMERCIAL

## 2025-02-07 DIAGNOSIS — I50.21 ACUTE SYSTOLIC HEART FAILURE: Primary | ICD-10-CM

## 2025-02-07 DIAGNOSIS — R00.2 PALPITATIONS: ICD-10-CM

## 2025-02-07 DIAGNOSIS — R94.31 NONSPECIFIC ABNORMAL ELECTROCARDIOGRAM (ECG) (EKG): ICD-10-CM

## 2025-02-07 PROBLEM — I42.8 NON-ISCHEMIC CARDIOMYOPATHY: Status: ACTIVE | Noted: 2025-02-07

## 2025-02-07 LAB — GLUCOSE SERPL-MCNC: 88 MG/DL (ref 70–105)

## 2025-02-07 PROCEDURE — 25000003 PHARM REV CODE 250: Performed by: STUDENT IN AN ORGANIZED HEALTH CARE EDUCATION/TRAINING PROGRAM

## 2025-02-07 PROCEDURE — 27201423 OPTIME MED/SURG SUP & DEVICES STERILE SUPPLY: Performed by: STUDENT IN AN ORGANIZED HEALTH CARE EDUCATION/TRAINING PROGRAM

## 2025-02-07 PROCEDURE — 93458 L HRT ARTERY/VENTRICLE ANGIO: CPT | Mod: 26,,, | Performed by: STUDENT IN AN ORGANIZED HEALTH CARE EDUCATION/TRAINING PROGRAM

## 2025-02-07 PROCEDURE — C1894 INTRO/SHEATH, NON-LASER: HCPCS | Performed by: STUDENT IN AN ORGANIZED HEALTH CARE EDUCATION/TRAINING PROGRAM

## 2025-02-07 PROCEDURE — C1887 CATHETER, GUIDING: HCPCS | Performed by: STUDENT IN AN ORGANIZED HEALTH CARE EDUCATION/TRAINING PROGRAM

## 2025-02-07 PROCEDURE — 25500020 PHARM REV CODE 255: Performed by: STUDENT IN AN ORGANIZED HEALTH CARE EDUCATION/TRAINING PROGRAM

## 2025-02-07 PROCEDURE — 82962 GLUCOSE BLOOD TEST: CPT

## 2025-02-07 PROCEDURE — 93458 L HRT ARTERY/VENTRICLE ANGIO: CPT | Performed by: STUDENT IN AN ORGANIZED HEALTH CARE EDUCATION/TRAINING PROGRAM

## 2025-02-07 PROCEDURE — 63600175 PHARM REV CODE 636 W HCPCS: Performed by: STUDENT IN AN ORGANIZED HEALTH CARE EDUCATION/TRAINING PROGRAM

## 2025-02-07 RX ORDER — MIDAZOLAM HYDROCHLORIDE 1 MG/ML
INJECTION INTRAMUSCULAR; INTRAVENOUS
Status: DISCONTINUED | OUTPATIENT
Start: 2025-02-07 | End: 2025-02-07 | Stop reason: HOSPADM

## 2025-02-07 RX ORDER — ACETAMINOPHEN 325 MG/1
650 TABLET ORAL EVERY 4 HOURS PRN
Status: DISCONTINUED | OUTPATIENT
Start: 2025-02-07 | End: 2025-02-07 | Stop reason: HOSPADM

## 2025-02-07 RX ORDER — IOPAMIDOL 755 MG/ML
INJECTION, SOLUTION INTRAVASCULAR
Status: DISCONTINUED | OUTPATIENT
Start: 2025-02-07 | End: 2025-02-07 | Stop reason: HOSPADM

## 2025-02-07 RX ORDER — HEPARIN SODIUM 5000 [USP'U]/ML
INJECTION, SOLUTION INTRAVENOUS; SUBCUTANEOUS
Status: DISCONTINUED | OUTPATIENT
Start: 2025-02-07 | End: 2025-02-07 | Stop reason: HOSPADM

## 2025-02-07 RX ORDER — SODIUM CHLORIDE 9 MG/ML
INJECTION, SOLUTION INTRAVENOUS
Status: DISCONTINUED | OUTPATIENT
Start: 2025-02-07 | End: 2025-02-07 | Stop reason: HOSPADM

## 2025-02-07 RX ORDER — ONDANSETRON 4 MG/1
8 TABLET, ORALLY DISINTEGRATING ORAL EVERY 8 HOURS PRN
Status: DISCONTINUED | OUTPATIENT
Start: 2025-02-07 | End: 2025-02-07 | Stop reason: HOSPADM

## 2025-02-07 RX ORDER — LIDOCAINE HYDROCHLORIDE 10 MG/ML
INJECTION, SOLUTION INFILTRATION; PERINEURAL
Status: DISCONTINUED | OUTPATIENT
Start: 2025-02-07 | End: 2025-02-07 | Stop reason: HOSPADM

## 2025-02-07 RX ORDER — VERAPAMIL HYDROCHLORIDE 2.5 MG/ML
INJECTION, SOLUTION INTRAVENOUS
Status: DISCONTINUED | OUTPATIENT
Start: 2025-02-07 | End: 2025-02-07 | Stop reason: HOSPADM

## 2025-02-07 RX ORDER — METOPROLOL SUCCINATE 50 MG/1
50 TABLET, EXTENDED RELEASE ORAL DAILY
Qty: 90 TABLET | Refills: 3 | Status: SHIPPED | OUTPATIENT
Start: 2025-02-07 | End: 2026-02-07

## 2025-02-07 RX ORDER — FENTANYL CITRATE 50 UG/ML
INJECTION, SOLUTION INTRAMUSCULAR; INTRAVENOUS
Status: DISCONTINUED | OUTPATIENT
Start: 2025-02-07 | End: 2025-02-07 | Stop reason: HOSPADM

## 2025-02-07 RX ORDER — NITROGLYCERIN 5 MG/ML
INJECTION, SOLUTION INTRAVENOUS
Status: DISCONTINUED | OUTPATIENT
Start: 2025-02-07 | End: 2025-02-07 | Stop reason: HOSPADM

## 2025-02-07 NOTE — Clinical Note
ID band present and verified. cholecystectomy 12/2020, chr diarrhea persists, nonbloody, pt does not want colonoscopy unless sx progress as previous difficulty with bowel prep 5y ago. has tried gluten free and low fodmap diets. hx fatty liver. social etoh. alt slightly elevated, other lft nml, extensive labs unremarkable '19.

## 2025-02-07 NOTE — INTERVAL H&P NOTE
Next appt 10/31/22 The patient has been examined and the H&P has been reviewed:    I concur with the findings and no changes have occurred since H&P was written.    Procedure risks, benefits and alternative options discussed and understood by patient/family.          There are no hospital problems to display for this patient.

## 2025-02-07 NOTE — DISCHARGE INSTRUCTIONS
Keep dressing to wrist in place for 24 hours. After 24 hours remove and gently clean site with mild soap and water then pat dry. Cover with band-aid. If bleeding from site apply pressure for 10-15 minutes. If bleeding continues or if there is excessive bruising or swelling to site go to closest ER for evaluation.  Do not submerge site in water for 72 hours.  No heavy lifting or other strenuous activities for 72 hours.  No driving for 24 hours.  Drink plenty of water over next 48 hours to help flush kidneys.  No smoking!  Keep all follow up appointments.  Take all medications as ordered. Notify your doctor immediately if you cannot afford your medications!  Thank you for allowing us at [facility] to care for you today!

## 2025-02-11 NOTE — DISCHARGE SUMMARY
[facility]  Discharge Note  Short Stay    Procedure(s) (LRB):  Left heart cath (N/A)      OUTCOME: Patient tolerated treatment/procedure well without complication and is now ready for discharge.    DISPOSITION: Home or Self Care    FINAL DIAGNOSIS:  Non-ischemic cardiomyopathy    FOLLOWUP: In clinic    DISCHARGE INSTRUCTIONS:  No discharge procedures on file.      Clinical Reference Documents Added to Patient Instructions         Document    CARDIAC CATHETERIZATION (ENGLISH)    HEART HEALTHY DIET (ENGLISH)            TIME SPENT ON DISCHARGE: 20 minutes

## 2025-02-17 ENCOUNTER — OFFICE VISIT (OUTPATIENT)
Dept: CARDIOLOGY | Facility: CLINIC | Age: 50
End: 2025-02-17
Payer: COMMERCIAL

## 2025-02-17 VITALS
WEIGHT: 225.38 LBS | HEIGHT: 67 IN | HEART RATE: 79 BPM | SYSTOLIC BLOOD PRESSURE: 100 MMHG | OXYGEN SATURATION: 99 % | DIASTOLIC BLOOD PRESSURE: 70 MMHG | BODY MASS INDEX: 35.37 KG/M2

## 2025-02-17 DIAGNOSIS — I10 PRIMARY HYPERTENSION: Primary | ICD-10-CM

## 2025-02-17 DIAGNOSIS — R00.2 PALPITATIONS: ICD-10-CM

## 2025-02-17 DIAGNOSIS — I50.21 ACUTE SYSTOLIC CHF (CONGESTIVE HEART FAILURE): ICD-10-CM

## 2025-02-17 PROCEDURE — 99214 OFFICE O/P EST MOD 30 MIN: CPT | Mod: PBBFAC | Performed by: REGISTERED NURSE

## 2025-02-17 NOTE — PROGRESS NOTES
PCP: Charlotte Martinez FNP    Referring Provider:     Subjective:   Russel Hill is a 49 y.o. male with hx of DM, HTN, HLD  who presents for evaluation of palpitations.    2/17/25: recent LHC w/ normal cors. He reports 25 lb weight loss w/ exercise and diet. No complaints today    Patient reports daily episodes of palpitations. 3-4 epi/day and last 2-3 mins. No dizziness or syncope.   Report vague chest pain and SOB.  He had a recent EKG that has concerning ischemic changes and an ECHO with an EF of 40-45%.       Fhx: mother- CVA  Shx: Denies smoking, etoh or drug use    EKG   - 2/17/25 - NSR  - 1/23/25 - NSR, LVH, Lateral HEAVEN with inferior ST depression.     ECHO - Results for orders placed during the hospital encounter of 01/23/25    Echo    Interpretation Summary    Left Ventricle: The left ventricle is normal in size. There is mild concentric hypertrophy. There is mildly reduced systolic function with a visually estimated ejection fraction of 40 - 50%. Ejection fraction is approximately 45%. There is normal diastolic function.    Right Ventricle: Normal right ventricular cavity size. Systolic function is normal.    Left Atrium: Left atrium is dilated.    IVC/SVC: Normal venous pressure at 3 mmHg.      LHC 2/4/25      The left ventricular end diastolic pressure was normal.    The pre-procedure left ventricular end diastolic pressure was 5.    The estimated blood loss was none.    The coronary arteries were normal..     The procedure log was documented by Documenter: Jennie Huerta RN and verified by Artem Babin MD.     Date: 2/7/2025  Time: 10:32 AM  Lab Results   Component Value Date     01/08/2025    K 4.0 01/08/2025     01/08/2025    CO2 23 01/08/2025    BUN 27 (H) 01/08/2025    CREATININE 1.15 01/08/2025    CALCIUM 9.5 01/08/2025    ANIONGAP 13 01/08/2025    ESTGFRAFRICA 113 05/12/2021    EGFRNONAA 69 06/16/2022       Lab Results   Component Value Date    CHOL 160 09/05/2024     CHOL 170 05/20/2024    CHOL 148 06/19/2023     Lab Results   Component Value Date    HDL 60 09/05/2024    HDL 65 (H) 05/20/2024    HDL 76 (H) 06/19/2023     Lab Results   Component Value Date    LDLCALC 70 09/05/2024    LDLCALC 73 05/20/2024    LDLCALC 58 06/19/2023     Lab Results   Component Value Date    TRIG 150 09/05/2024    TRIG 162 (H) 05/20/2024    TRIG 71 06/19/2023     Lab Results   Component Value Date    CHOLHDL 2.7 09/05/2024    CHOLHDL 2.6 05/20/2024    CHOLHDL 1.9 06/19/2023       Lab Results   Component Value Date    WBC 7.04 01/08/2025    HGB 13.8 01/08/2025    HCT 44.0 01/08/2025    MCV 83.7 01/08/2025     01/08/2025           Current Outpatient Medications:     linaCLOtide (LINZESS) 145 mcg Cap capsule, Take 1 capsule (145 mcg total) by mouth before breakfast., Disp: 90 capsule, Rfl: 3    lisinopriL (PRINIVIL,ZESTRIL) 20 MG tablet, Take 1 tablet (20 mg total) by mouth once daily., Disp: 90 tablet, Rfl: 3    metFORMIN (GLUCOPHAGE) 500 MG tablet, Take 1 tablet (500 mg total) by mouth daily with breakfast., Disp: 90 tablet, Rfl: 3    metoprolol succinate (TOPROL-XL) 50 MG 24 hr tablet, Take 1 tablet by mouth once daily., Disp: 90 tablet, Rfl: 3    NIFEdipine (ADALAT CC) 60 MG TbSR, Take 1 tablet (60 mg total) by mouth once daily., Disp: 90 tablet, Rfl: 3    potassium chloride SA (K-DUR,KLOR-CON) 20 MEQ tablet, Take 1 tablet (20 mEq total) by mouth once daily., Disp: 90 tablet, Rfl: 3    pravastatin (PRAVACHOL) 20 MG tablet, Take 1 tablet (20 mg total) by mouth once daily., Disp: 90 tablet, Rfl: 3    semaglutide (OZEMPIC) 2 mg/dose (8 mg/3 mL) PnIj, Inject 2 mg into the skin every 7 days., Disp: 9 mL, Rfl: 3    tamsulosin (FLOMAX) 0.4 mg Cap, Take one capsule every night, Disp: 90 capsule, Rfl: 3    triamterene-hydrochlorothiazide 37.5-25 mg (DYAZIDE) 37.5-25 mg per capsule, Take 1 capsule by mouth once daily., Disp: 90 capsule, Rfl: 3    aspirin 81 MG Chew, Take 1 tablet (81 mg total) by mouth  "once daily. (Patient not taking: Reported on 2/17/2025), Disp: 90 tablet, Rfl: 3    Review of Systems   Respiratory:  Negative for cough and shortness of breath.    Cardiovascular:  Negative for chest pain, palpitations, orthopnea, claudication, leg swelling and PND.   All other systems reviewed and are negative.        Objective:   /70 (BP Location: Left arm, Patient Position: Sitting)   Pulse 79   Ht 5' 7" (1.702 m)   Wt 102.2 kg (225 lb 6.4 oz)   SpO2 99%   BMI 35.30 kg/m²     Physical Exam  Vitals and nursing note reviewed.   Constitutional:       Appearance: Normal appearance.   HENT:      Head: Normocephalic and atraumatic.   Cardiovascular:      Rate and Rhythm: Normal rate and regular rhythm.      Pulses: Normal pulses.      Heart sounds: Normal heart sounds.   Pulmonary:      Effort: Pulmonary effort is normal. No respiratory distress.      Breath sounds: Normal breath sounds.   Musculoskeletal:         General: No swelling. Normal range of motion.      Cervical back: Normal range of motion and neck supple.   Skin:     General: Skin is warm and dry.      Capillary Refill: Capillary refill takes less than 2 seconds.   Neurological:      General: No focal deficit present.      Mental Status: He is alert and oriented to person, place, and time.   Psychiatric:         Mood and Affect: Mood normal.           Assessment:     1. Primary hypertension  EKG 12-lead    EKG 12-lead      2. Acute systolic CHF (congestive heart failure)  EKG 12-lead    EKG 12-lead      3. Palpitations              Plan:   Primary hypertension  Well controlled Lisinopril, Nifedipine, Dyazide    Palpitations  improved on Toprol    Acute systolic heart failure  NYHA I, Stage B EF 45%, Euvolemic  LHC clean cors  GDMT - start metop xl 25mg qd, lisinopril 20mg qd  Bp labile no further GDMT started               "

## 2025-02-17 NOTE — ASSESSMENT & PLAN NOTE
NYHA I, Stage B EF 45%, Euvolemic  LHC clean cors  GDMT - start metop xl 25mg qd, lisinopril 20mg qd  Bp labile no further GDMT started

## 2025-02-17 NOTE — LETTER
February 17, 2025      [facility]  [address] [facility] [place] [address]  Phone: [phone]  Fax: [phone]       Patient: Russel Hill   YOB: 1975  Date of Visit: 02/17/2025    To Whom It May Concern:    Asher Hill  was at [facility] on 02/17/2025. He may return to work on 02/18/2025 with no restrictions. If you have any questions or concerns, or if I can be of further assistance, please do not hesitate to contact me.    Sincerely,    Joseph Saravia CMA

## 2025-03-14 ENCOUNTER — RESULTS FOLLOW-UP (OUTPATIENT)
Dept: CARDIOLOGY | Facility: CLINIC | Age: 50
End: 2025-03-14

## 2025-03-18 NOTE — PROGRESS NOTES
Pt. Notified extra beats noted on heart monitor avoid caeffine and nicotine per Dr. Babin. Pt. Voiced understanding. Pt. Wanted to know if ok to drink alcohol occasionally. Pt. Advised ok to occasionally drink alcohol per Dr. Babin. Pt. Voiced understanding.

## 2025-05-08 ENCOUNTER — OFFICE VISIT (OUTPATIENT)
Dept: FAMILY MEDICINE | Facility: CLINIC | Age: 50
End: 2025-05-08
Payer: COMMERCIAL

## 2025-05-08 ENCOUNTER — PATIENT OUTREACH (OUTPATIENT)
Facility: HOSPITAL | Age: 50
End: 2025-05-08
Payer: COMMERCIAL

## 2025-05-08 VITALS
RESPIRATION RATE: 18 BRPM | OXYGEN SATURATION: 98 % | HEIGHT: 67 IN | BODY MASS INDEX: 31.2 KG/M2 | TEMPERATURE: 98 F | SYSTOLIC BLOOD PRESSURE: 96 MMHG | HEART RATE: 73 BPM | DIASTOLIC BLOOD PRESSURE: 62 MMHG | WEIGHT: 198.81 LBS

## 2025-05-08 DIAGNOSIS — E78.49 OTHER HYPERLIPIDEMIA: Chronic | ICD-10-CM

## 2025-05-08 DIAGNOSIS — N40.1 BENIGN PROSTATIC HYPERPLASIA WITH URINARY OBSTRUCTION: Chronic | ICD-10-CM

## 2025-05-08 DIAGNOSIS — E66.811 OBESITY, CLASS I, BMI 30-34.9: Chronic | ICD-10-CM

## 2025-05-08 DIAGNOSIS — R30.0 DYSURIA: ICD-10-CM

## 2025-05-08 DIAGNOSIS — N41.0 ACUTE PROSTATITIS: ICD-10-CM

## 2025-05-08 DIAGNOSIS — E87.6 HYPOKALEMIA: ICD-10-CM

## 2025-05-08 DIAGNOSIS — N13.8 BENIGN PROSTATIC HYPERPLASIA WITH URINARY OBSTRUCTION: Chronic | ICD-10-CM

## 2025-05-08 DIAGNOSIS — I10 PRIMARY HYPERTENSION: Chronic | ICD-10-CM

## 2025-05-08 DIAGNOSIS — E11.9 TYPE 2 DIABETES MELLITUS WITHOUT COMPLICATION, WITHOUT LONG-TERM CURRENT USE OF INSULIN: Primary | Chronic | ICD-10-CM

## 2025-05-08 PROBLEM — R06.02 SHORTNESS OF BREATH: Status: RESOLVED | Noted: 2025-01-08 | Resolved: 2025-05-08

## 2025-05-08 LAB
ANION GAP SERPL CALCULATED.3IONS-SCNC: 13 MMOL/L (ref 7–16)
BILIRUB UR QL STRIP: NEGATIVE
BUN SERPL-MCNC: 15 MG/DL (ref 9–21)
BUN/CREAT SERPL: 17 (ref 6–20)
CALCIUM SERPL-MCNC: 9.6 MG/DL (ref 8.4–10.2)
CHLORIDE SERPL-SCNC: 107 MMOL/L (ref 98–107)
CLARITY UR: CLEAR
CO2 SERPL-SCNC: 24 MMOL/L (ref 22–29)
COLOR UR: NORMAL
CREAT SERPL-MCNC: 0.89 MG/DL (ref 0.72–1.25)
EGFR (NO RACE VARIABLE) (RUSH/TITUS): 105 ML/MIN/1.73M2
EST. AVERAGE GLUCOSE BLD GHB EST-MCNC: 108 MG/DL
GLUCOSE SERPL-MCNC: 95 MG/DL (ref 74–100)
GLUCOSE UR STRIP-MCNC: NORMAL MG/DL
HBA1C MFR BLD HPLC: 5.4 %
KETONES UR STRIP-SCNC: NEGATIVE MG/DL
LEUKOCYTE ESTERASE UR QL STRIP: NEGATIVE
NITRITE UR QL STRIP: NEGATIVE
PH UR STRIP: 5.5 PH UNITS
POTASSIUM SERPL-SCNC: 3.8 MMOL/L (ref 3.5–5.1)
PROT UR QL STRIP: NEGATIVE
RBC # UR STRIP: NEGATIVE /UL
SODIUM SERPL-SCNC: 140 MMOL/L (ref 136–145)
SP GR UR STRIP: 1.01
UROBILINOGEN UR STRIP-ACNC: NORMAL MG/DL

## 2025-05-08 PROCEDURE — 3008F BODY MASS INDEX DOCD: CPT | Mod: CPTII,,, | Performed by: NURSE PRACTITIONER

## 2025-05-08 PROCEDURE — 80048 BASIC METABOLIC PNL TOTAL CA: CPT | Mod: ,,, | Performed by: CLINICAL MEDICAL LABORATORY

## 2025-05-08 PROCEDURE — 3074F SYST BP LT 130 MM HG: CPT | Mod: CPTII,,, | Performed by: NURSE PRACTITIONER

## 2025-05-08 PROCEDURE — 3061F NEG MICROALBUMINURIA REV: CPT | Mod: CPTII,,, | Performed by: NURSE PRACTITIONER

## 2025-05-08 PROCEDURE — 1160F RVW MEDS BY RX/DR IN RCRD: CPT | Mod: CPTII,,, | Performed by: NURSE PRACTITIONER

## 2025-05-08 PROCEDURE — 3066F NEPHROPATHY DOC TX: CPT | Mod: CPTII,,, | Performed by: NURSE PRACTITIONER

## 2025-05-08 PROCEDURE — 1159F MED LIST DOCD IN RCRD: CPT | Mod: CPTII,,, | Performed by: NURSE PRACTITIONER

## 2025-05-08 PROCEDURE — 3044F HG A1C LEVEL LT 7.0%: CPT | Mod: CPTII,,, | Performed by: NURSE PRACTITIONER

## 2025-05-08 PROCEDURE — 83036 HEMOGLOBIN GLYCOSYLATED A1C: CPT | Mod: ,,, | Performed by: CLINICAL MEDICAL LABORATORY

## 2025-05-08 PROCEDURE — 99214 OFFICE O/P EST MOD 30 MIN: CPT | Mod: ,,, | Performed by: NURSE PRACTITIONER

## 2025-05-08 PROCEDURE — 81003 URINALYSIS AUTO W/O SCOPE: CPT | Mod: QW,,, | Performed by: CLINICAL MEDICAL LABORATORY

## 2025-05-08 PROCEDURE — 4010F ACE/ARB THERAPY RXD/TAKEN: CPT | Mod: CPTII,,, | Performed by: NURSE PRACTITIONER

## 2025-05-08 PROCEDURE — 3078F DIAST BP <80 MM HG: CPT | Mod: CPTII,,, | Performed by: NURSE PRACTITIONER

## 2025-05-08 RX ORDER — NIFEDIPINE 30 MG/1
30 TABLET, EXTENDED RELEASE ORAL DAILY
Qty: 90 TABLET | Refills: 3 | Status: SHIPPED | OUTPATIENT
Start: 2025-05-08 | End: 2026-05-08

## 2025-05-08 RX ORDER — DOXYCYCLINE 100 MG/1
CAPSULE ORAL
Qty: 40 CAPSULE | Refills: 0 | Status: SHIPPED | OUTPATIENT
Start: 2025-05-08

## 2025-05-08 RX ORDER — POTASSIUM CHLORIDE 20 MEQ/1
20 TABLET, EXTENDED RELEASE ORAL DAILY
Qty: 90 TABLET | Refills: 3 | Status: SHIPPED | OUTPATIENT
Start: 2025-05-08

## 2025-05-08 RX ORDER — LISINOPRIL 10 MG/1
10 TABLET ORAL DAILY
Qty: 90 TABLET | Refills: 3 | Status: SHIPPED | OUTPATIENT
Start: 2025-05-08 | End: 2026-05-08

## 2025-05-08 RX ORDER — TAMSULOSIN HYDROCHLORIDE 0.4 MG/1
0.8 CAPSULE ORAL DAILY
Qty: 180 CAPSULE | Refills: 3 | Status: SHIPPED | OUTPATIENT
Start: 2025-05-08

## 2025-05-08 RX ORDER — PRAVASTATIN SODIUM 20 MG/1
20 TABLET ORAL DAILY
Qty: 90 TABLET | Refills: 3 | Status: SHIPPED | OUTPATIENT
Start: 2025-05-08

## 2025-05-08 RX ORDER — TRIAMTERENE AND HYDROCHLOROTHIAZIDE 37.5; 25 MG/1; MG/1
1 CAPSULE ORAL DAILY
Qty: 90 CAPSULE | Refills: 3 | Status: SHIPPED | OUTPATIENT
Start: 2025-05-08 | End: 2026-05-08

## 2025-05-08 RX ORDER — SEMAGLUTIDE 2.68 MG/ML
2 INJECTION, SOLUTION SUBCUTANEOUS
Qty: 9 ML | Refills: 3 | Status: SHIPPED | OUTPATIENT
Start: 2025-05-08 | End: 2026-05-08

## 2025-05-08 NOTE — PROGRESS NOTES
[facility]  [address] [place] [address]  Phone: [phone]  Fax: [phone]       PATIENT NAME: Russel Hill   : 1975    AGE: 49 y.o. DATE OF ENCOUNTER: 25    MRN: [id number]      PCP: Charlotte Martinez FNP    Subjective:   CHANGE CHIEF COMPLAINT      :[id number]}274}  Chief Complaint   Patient presents with    Diabetes     Patient reports to the clinic today for his 4 month follow up.    Health Maintenance     Care gaps addressed, patient had his eye exam about 3 months ago with [facility].    Juliet Carmona CMA     History of Present Illness    HPI:  Patient reports ongoing issues with his prostate and urinary symptoms. He describes difficulty urinating, with a slow stream that requires straining. Patient has been having pain, which he attributes to doubling his prostate medication (likely referring to tamsulosin/Flomax) over the past week. The pain has lessened since he ran out of the medication and did not take it this morning.    He was previously evaluated by a urologist, as recommended by the current provider. The urologist performed a cystoscopy, which showed no tumors, stones, or diverticula. He was diagnosed with an enlarged prostate (benign prostatic hypertrophy) and chronic prostatitis. He was prescribed doxycycline, which provided some relief for a while.    He also reports intermittency, weak stream, straining, nocturia, dysuria, and aching in the groin and lower abdomen. Sometimes his urine stream is split, and his urine is occasionally clear instead of yellow.  He was also prescribed Flomax which has helped and he took his last dose yesterday, but reports he had to double the dosage recently.    Regarding diabetes management, he stopped taking Metformin due to weight loss and improved blood sugar, which he reports are now in the 90s when he checks. He continues to take Ozempic.    He has lost significant weight, dropping from 249  lbs in January to 198 lbs currently, a total loss of about 50 lbs over 4 months. He attributes this to recent exercise and reducing consumption of unhealthy foods.    For his cardiovascular health, he was evaluated by a cardiologist who advised him to reduce caffeine intake due to palpitations. He underwent a heart catheterization, which showed no blockages. The cardiologist prescribed medication to help with heart rhythm (metoprolol).    He has been checking his blood pressure regularly and notes that it varies but is often low lately.      ROS:  Constitutional: +weight loss  Cardiovascular: +palpitations, +feeling of skipped beats  Genitourinary: +dysuria, +difficulty urinating         Allergies and Meds: 274}     Review of patient's allergies indicates:   Allergen Reactions    Marijuana/cannabinoid      seizure    Melon      watermellon        Current Outpatient Medications   Medication Sig Dispense Refill    linaCLOtide (LINZESS) 145 mcg Cap capsule Take 1 capsule (145 mcg total) by mouth before breakfast. 90 capsule 3    lisinopriL (PRINIVIL,ZESTRIL) 20 MG tablet Take 1 tablet (20 mg total) by mouth once daily. 90 tablet 3    metoprolol succinate (TOPROL-XL) 50 MG 24 hr tablet Take 1 tablet by mouth once daily. 90 tablet 3    NIFEdipine (ADALAT CC) 60 MG TbSR Take 1 tablet (60 mg total) by mouth once daily. 90 tablet 3    potassium chloride SA (K-DUR,KLOR-CON) 20 MEQ tablet Take 1 tablet (20 mEq total) by mouth once daily. 90 tablet 3    pravastatin (PRAVACHOL) 20 MG tablet Take 1 tablet (20 mg total) by mouth once daily. 90 tablet 3    semaglutide (OZEMPIC) 2 mg/dose (8 mg/3 mL) PnIj Inject 2 mg into the skin every 7 days. 9 mL 3    tamsulosin (FLOMAX) 0.4 mg Cap Take one capsule every night 90 capsule 3    triamterene-hydrochlorothiazide 37.5-25 mg (DYAZIDE) 37.5-25 mg per capsule Take 1 capsule by mouth once daily. 90 capsule 3    metFORMIN (GLUCOPHAGE) 500 MG tablet Take 1 tablet (500 mg total) by mouth  daily with breakfast. (Patient not taking: Reported on 5/8/2025) 90 tablet 3     No current facility-administered medications for this visit.       Labs:274}   I have reviewed labs below:    Lab Results   Component Value Date    WBC 7.04 01/08/2025    RBC 5.26 01/08/2025    HGB 13.8 01/08/2025    HCT 44.0 01/08/2025     01/08/2025     01/08/2025    K 4.0 01/08/2025     01/08/2025    CALCIUM 9.5 01/08/2025    GLU 90 01/08/2025    BUN 27 (H) 01/08/2025    CREATININE 1.15 01/08/2025    ESTGFRAFRICA 113 05/12/2021    EGFRNONAA 69 06/16/2022    ALT 39 05/20/2024    AST 27 05/20/2024    CHOL 160 09/05/2024    TRIG 150 09/05/2024    HDL 60 09/05/2024    LDLCALC 70 09/05/2024    TSH 0.620 01/08/2025    PSA 0.396 09/05/2024    HGBA1C 6.4 01/08/2025    MICROALBUR 2.5 01/08/2025       Medical History: 274}     Past Medical History:   Diagnosis Date    Erectile dysfunction     Hypercholesteremia 03/04/2016    Primary hypertension     Type 2 diabetes mellitus without complication, without long-term current use of insulin       Tobacco Use History[1]   Past Surgical History:   Procedure Laterality Date    CIRCUMCISION  1996    COLON RESECTION Right 11/16/2023    Procedure: COLON RESECTION;  Surgeon: Boby Godwin MD;  Location: [facility] OR;  Service: General;  Laterality: Right;    LEFT HEART CATHETERIZATION N/A 2/7/2025    Procedure: Left heart cath;  Surgeon: Artem Babin MD;  Location: [facility] CATH LAB;  Service: Cardiology;  Laterality: N/A;        Health Maintenance:      Health Maintenance Topics with due status: Not Due       Topic Last Completion Date    TETANUS VACCINE 05/20/2024    Lipid Panel 09/05/2024    Diabetes Urine Screening 01/08/2025    Foot Exam 01/08/2025    Colorectal Cancer Screening 02/04/2025    Low Dose Statin 05/08/2025    Hemoglobin A1c 05/08/2025    RSV Vaccine (Age 60+ and Pregnant patients) Not Due       Objective:  274}   Vital Signs  Temp: 98 °F (36.7 °C)  Temp  "Source: Oral  Pulse: 73  Resp: 18  SpO2: 98 %  BP: 96/62  BP Location: Left arm  Patient Position: Sitting  Pain Score:   7  Pain Loc: Groin  Height and Weight  Height: 5' 7" (170.2 cm)  Weight: 90.2 kg (198 lb 12.8 oz)  BSA (Calculated - sq m): 2.06 sq meters  BMI (Calculated): 31.1  Weight in (lb) to have BMI = 25: 159.3    Over the last two weeks how often have you been bothered by little interest or pleasure in doing things: 0  Over the last two weeks how often have you been bothered by feeling down, depressed or hopeless: 0  PHQ-2 Total Score: 0    Wt Readings from Last 3 Encounters:   05/08/25 90.2 kg (198 lb 12.8 oz)   02/17/25 102.2 kg (225 lb 6.4 oz)   02/04/25 102.1 kg (225 lb)     Physical Exam  Vitals and nursing note reviewed.   Constitutional:       General: He is not in acute distress.     Appearance: Normal appearance. He is not ill-appearing.   HENT:      Head: Normocephalic.   Eyes:      Conjunctiva/sclera: Conjunctivae normal.   Cardiovascular:      Rate and Rhythm: Normal rate and regular rhythm.      Heart sounds: Normal heart sounds.   Pulmonary:      Effort: Pulmonary effort is normal. No respiratory distress.      Breath sounds: Normal breath sounds.   Skin:     General: Skin is warm and dry.      Coloration: Skin is not jaundiced or pale.   Neurological:      Mental Status: He is alert and oriented to person, place, and time.      Gait: Gait normal.   Psychiatric:         Mood and Affect: Mood normal.         Behavior: Behavior normal.         Thought Content: Thought content normal.         Judgment: Judgment normal.          Assessment and Plan: 274}       1. Type 2 diabetes mellitus without complication, without long-term current use of insulin  Assessment & Plan:  Last A1c 6.4% 1/08/25.  Has intentionally lost weight and stopped taking metformin.  Continue Ozempic 2 mg weekly.    Orders:  -     Hemoglobin A1C; Future; Expected date: 05/08/2025  -     Basic Metabolic Panel; Future; " Expected date: 05/08/2025  -     semaglutide (OZEMPIC) 2 mg/dose (8 mg/3 mL) PnIj; Inject 2 mg into the skin every 7 days.  Dispense: 9 mL; Refill: 3    2. Primary hypertension  Assessment & Plan:  BP Readings from Last 4 Encounters:   05/08/25 96/62   02/17/25 100/70   02/04/25 (!) 101/54   01/27/25 120/82     BP running low since he has lost weight and feels weak at times    Decrease nifedipine from 60 mg to 30 mg daily.  Decrease lisinopril from 20 mg to 10 mg.  Continue triam-hctz.  Continue metoprolol XL 50 mg daily.     Orders:  -     NIFEdipine (ADALAT CC) 30 MG TbSR; Take 1 tablet (30 mg total) by mouth once daily.  Dispense: 90 tablet; Refill: 3  -     lisinopriL 10 MG tablet; Take 1 tablet (10 mg total) by mouth once daily.  Dispense: 90 tablet; Refill: 3  -     triamterene-hydrochlorothiazide 37.5-25 mg (DYAZIDE) 37.5-25 mg per capsule; Take 1 capsule by mouth once daily.  Dispense: 90 capsule; Refill: 3    3. Benign prostatic hyperplasia with urinary obstruction  -     tamsulosin (FLOMAX) 0.4 mg Cap; Take 2 capsules (0.8 mg total) by mouth once daily. Take one capsule every night  Dispense: 180 capsule; Refill: 3  -     Urinalysis, Reflex to Urine Culture; Future; Expected date: 05/08/2025    4. Acute prostatitis  -     doxycycline (VIBRAMYCIN) 100 MG Cap; take one capsule twice a day for 10 days then reduce to one daily, avoid direct sunlight, no milk, milk products or antacids within two hours of taking this medication  Dispense: 40 capsule; Refill: 0  -     Urinalysis, Reflex to Urine Culture; Future; Expected date: 05/08/2025    5. Dysuria  -     doxycycline (VIBRAMYCIN) 100 MG Cap; take one capsule twice a day for 10 days then reduce to one daily, avoid direct sunlight, no milk, milk products or antacids within two hours of taking this medication  Dispense: 40 capsule; Refill: 0  -     Urinalysis, Reflex to Urine Culture; Future; Expected date: 05/08/2025    6. Other hyperlipidemia  -      pravastatin (PRAVACHOL) 20 MG tablet; Take 1 tablet (20 mg total) by mouth once daily.  Dispense: 90 tablet; Refill: 3    7. Hypokalemia  -     potassium chloride SA (K-DUR,KLOR-CON) 20 MEQ tablet; Take 1 tablet (20 mEq total) by mouth once daily.  Dispense: 90 tablet; Refill: 3    8. Obesity, Class I, BMI 30-34.9  Assessment & Plan:  Wt Readings from Last 3 Encounters:   05/08/25 90.2 kg (198 lb 12.8 oz)   02/17/25 102.2 kg (225 lb 6.4 oz)   02/04/25 102.1 kg (225 lb)     09/05/24 109.4 kg (241 lb 3.2 oz)     Has lost 43 lbs in the past several months with lifestyle changes.          Assessment & Plan    IMPRESSION:  - Assessed prostate issues, likely chronic prostatitis based on previous urology notes, noting that enlarged prostate can lead to recurrent infections.  - Initiated antibiotic treatment with doxycycline for suspected recurrent prostate infection.  - Reviewed recent weight loss (approximately 50 lbs since January) and its positive impact on diabetes management.  - Noted report of heart rhythm issues previously addressed by cardiologist.    ## ESSENTIAL HYPERTENSION:  - Decreased Lisinopril from 20 mg to 10 mg and Nifedipine from 60 mg to 30 mg daily due to weight loss and episodes of low blood pressure.  - Continued Triamterene/HCTZ and potassium supplement.  - Noted blood pressure fluctuates, and is often running low.  - Instructed patient to contact the office if blood pressure drops below 90 systolic.    ## BENIGN PROSTATIC HYPERTROPHY AND LOWER URINARY TRACT SYMPTOMS:  - Prescribed doxycycline to treat dysuria.  - Advised to continue taking tamsulosin (Flomax) to manage urinary symptoms.  - Ordered urine specimen for analysis.  - Explained that pale yellow to clear urine indicates adequate hydration.    ## TYPE 2 DIABETES MELLITUS:  - Noted glucose levels are in the 90s and weight has decreased from 225 to 198 lbs.  - Assessed improvement in diabetes due to weight loss and exercise.  - Advised to  continue taking Ozempic due to well-controlled glucose levels.  - Will continue monitoring diabetes progress and ordered labs.    ## PALPITATIONS AND HEART RHYTHM ISSUES:  - Advised to continue taking metoprolol as prescribed by cardiologist.  - Recommend reducing caffeine intake.    ## WEIGHT LOSS:  - Documented weight reduction from 225 to 198 lbs, and from 249 lbs since January.  - Attributed weight loss to exercise and reduced junk food consumption.  - Will continue monitoring progress.    ## HISTORY OF COLON CANCER:  - Documented history of right hemicolectomy due to colon cancer.  - Noted recent polyp was non-cancerous.  - Scheduled follow-up colonoscopy in 2 years.  - Will continue monitoring for recurrence.    ## FOLLOW-UP:  - Scheduled follow up in 1 month to reassess genitourinary infection status after doxycycline treatment and to evaluate medication changes for BP running low.     Signature:  MICAELA Lowry-BC    Future Appointments   Date Time Provider Department Center   6/10/2025  1:00 PM Charlotte Martinez FNP [facility]   8/19/2025  2:45 PM Artem Babin MD [facility]   9/16/2025  9:20 AM Charlotte Martinez FNP [facility]     This note was generated with the assistance of ambient listening technology. Verbal consent was obtained by the patient and accompanying visitor(s) for the recording of patient appointment to facilitate this note. I attest to having reviewed and edited the generated note for accuracy, though some syntax or spelling errors may persist. Please contact the author of this note for any clarification.           [1]   Social History  Tobacco Use   Smoking Status Never   Smokeless Tobacco Never

## 2025-05-08 NOTE — PROGRESS NOTES
Population Health Chart Review & Patient Outreach Details    Updates Requested / Reviewed:  [x]  Care Team Updated    Health Maintenance Topics Addressed and Outreach Outcomes / Actions Taken:  Diabetic Eye Exam [x] YOLANDE sent to [facility].

## 2025-05-08 NOTE — LETTER
May 8, 2025    Russel Hill  [address] [place] [address] [place] [address]             [facility]  Family Medicine  [address] [place] [address] [place] [address]  Phone: [phone]  Fax: [phone]   May 8, 2025     Patient: Russel Hill   YOB: 1975   Date of Visit: 5/8/2025       To Whom it May Concern:    Russel Hill was seen in my clinic on 5/8/2025. He may return to work on 05/08/2025.    Please excuse him from any classes or work missed.    If you have any questions or concerns, please don't hesitate to call.    Sincerely,         Charlotte Martinez, IZZYP

## 2025-05-08 NOTE — ASSESSMENT & PLAN NOTE
Wt Readings from Last 3 Encounters:   05/08/25 90.2 kg (198 lb 12.8 oz)   02/17/25 102.2 kg (225 lb 6.4 oz)   02/04/25 102.1 kg (225 lb)     01/08/25 112.9 kg (249 lb)     09/05/24 109.4 kg (241 lb 3.2 oz)     Has lost 51 lbs since his visit in early January of this year.

## 2025-05-08 NOTE — LETTER
AUTHORIZATION FOR RELEASE OF   CONFIDENTIAL INFORMATION    Dear [facility],    We are seeing Russel Hill, date of birth 1975, in the clinic at [facility]. Charlotte Martinez FNP is the patient's PCP. Russel Hill has an outstanding lab/procedure at the time we reviewed his chart. In order to help keep his health information updated, he has authorized us to request the following medical record(s):        (  )  MAMMOGRAM                                      (  )  COLONOSCOPY      (  )  PAP SMEAR                                          (  )  OUTSIDE LAB RESULTS     (  )  DEXA SCAN                                          ( x )  EYE EXAM            (  )  FOOT EXAM                                          (  )  ENTIRE RECORD     (  )  OUTSIDE IMMUNIZATIONS                 (  )  _______________         Please fax records to Catie Simpson LPN Care Coordinator at [phone].      If you have any questions, please contact Catie at [phone].           Patient Name: Russel Hill  : 1975  Patient Phone #: [phone]

## 2025-05-08 NOTE — ASSESSMENT & PLAN NOTE
BP Readings from Last 4 Encounters:   05/08/25 96/62   02/17/25 100/70   02/04/25 (!) 101/54   01/27/25 120/82     BP running low since he has lost weight and feels weak at times    Decrease nifedipine from 60 mg to 30 mg daily.  Decrease lisinopril from 20 mg to 10 mg.  Continue triam-hctz.  Continue metoprolol XL 50 mg daily.

## 2025-05-08 NOTE — ASSESSMENT & PLAN NOTE
Last A1c 6.4% 1/08/25.  Has intentionally lost weight and stopped taking metformin.  Continue Ozempic 2 mg weekly.

## 2025-05-11 ENCOUNTER — RESULTS FOLLOW-UP (OUTPATIENT)
Dept: FAMILY MEDICINE | Facility: CLINIC | Age: 50
End: 2025-05-11

## 2025-05-11 NOTE — PROGRESS NOTES
Please contact the patient to review results and let them know that their results were okay and do not require any new treatment or change in current treatment plan.  It is fine to stay off metformin as his DM control is much improved since he lost weight. UA was fine.  Complete doxycycline as prescribed to cover prostate infection.  F/u as planned.

## 2025-05-11 NOTE — LETTER
May 14, 2025    Russel Hill  [address] [place] [address] [place] [address]             [facility] [address] [place] [address]  Phone: [phone]  Fax: [phone] Dear Mr. Russel Hill:    Below are the results from your recent visit:    Resulted Orders   Hemoglobin A1C   Result Value Ref Range    Hemoglobin A1C 5.4 <=7.0 %      Comment:        Normal:               <5.7%  Pre-Diabetic:       5.7% to 6.4%  Diabetic:             >6.4%  Diabetic Goal:     <7%    Estimated Average Glucose 108 mg/dL   Basic Metabolic Panel   Result Value Ref Range    Sodium 140 136 - 145 mmol/L    Potassium 3.8 3.5 - 5.1 mmol/L    Chloride 107 98 - 107 mmol/L    CO2 24 22 - 29 mmol/L    Anion Gap 13 7 - 16 mmol/L    Glucose 95 74 - 100 mg/dL    BUN 15 9 - 21 mg/dL    Creatinine 0.89 0.72 - 1.25 mg/dL    BUN/Creatinine Ratio 17 6 - 20    Calcium 9.6 8.4 - 10.2 mg/dL    eGFR 105 >=60 mL/min/1.73m2      Comment:      Estimated GFR calculated using the CKD-EPI creatinine (2021) equation.   Urinalysis, Reflex to Urine Culture   Result Value Ref Range    Color, UA Light Yellow Colorless, Straw, Yellow, Dark Yellow, Light Yellow    Clarity, UA Clear Clear    pH, UA 5.5 5.0 to 8.0 pH Units    Leukocytes, UA Negative Negative    Nitrites, UA Negative Negative    Protein, UA Negative Negative    Glucose, UA Normal Normal mg/dL    Ketones, UA Negative Negative mg/dL    Urobilinogen, UA Normal 0.2, 1.0, Normal mg/dL    Bilirubin, UA Negative Negative    Blood, UA Negative Negative    Specific Gravity, UA 1.015 <=1.030     Your results are normal. their results were okay and do not require any new treatment or change in current treatment plan. It is fine to stay off metformin as his DM control is much improved since he lost weight. UA was fine. Complete doxycycline as prescribed to cover prostate infection. F/u as planned.  Please don't hesitate to call our office if you have any questions or  concerns.      Sincerely,        Charlotte Martinez, MICAELA

## 2025-05-19 ENCOUNTER — PATIENT OUTREACH (OUTPATIENT)
Facility: HOSPITAL | Age: 50
End: 2025-05-19
Payer: COMMERCIAL

## 2025-05-19 NOTE — PROGRESS NOTES
Population Health Chart Review & Patient Outreach Details    Updates Requested / Reviewed:  [x]  Care Team Updated    Health Maintenance Topics Addressed and Outreach Outcomes / Actions Taken:  Diabetic Eye Exam [x] HM Updated with January 2025 Eye Exam (Dr. Xiong). History Updated.

## 2025-06-10 ENCOUNTER — HOSPITAL ENCOUNTER (EMERGENCY)
Facility: HOSPITAL | Age: 50
Discharge: HOME | End: 2025-06-10
Attending: FAMILY MEDICINE
Payer: COMMERCIAL

## 2025-06-10 VITALS
WEIGHT: 203 LBS | BODY MASS INDEX: 31.86 KG/M2 | TEMPERATURE: 99 F | DIASTOLIC BLOOD PRESSURE: 79 MMHG | SYSTOLIC BLOOD PRESSURE: 127 MMHG | HEIGHT: 67 IN | RESPIRATION RATE: 16 BRPM | HEART RATE: 72 BPM | OXYGEN SATURATION: 97 %

## 2025-06-10 DIAGNOSIS — R07.9 CHEST PAIN: ICD-10-CM

## 2025-06-10 LAB
ALBUMIN SERPL BCP-MCNC: 4 G/DL (ref 3.5–5)
ALBUMIN/GLOB SERPL: 1.2 {RATIO}
ALP SERPL-CCNC: 51 U/L (ref 40–150)
ALT SERPL W P-5'-P-CCNC: 32 U/L
ANION GAP SERPL CALCULATED.3IONS-SCNC: 11 MMOL/L (ref 7–16)
AST SERPL W P-5'-P-CCNC: 29 U/L (ref 11–45)
BASOPHILS # BLD AUTO: 0.05 K/UL (ref 0–0.2)
BASOPHILS NFR BLD AUTO: 0.7 % (ref 0–1)
BILIRUB SERPL-MCNC: 1 MG/DL
BUN SERPL-MCNC: 36 MG/DL (ref 9–21)
BUN/CREAT SERPL: 23 (ref 6–20)
CALCIUM SERPL-MCNC: 9.6 MG/DL (ref 8.4–10.2)
CHLORIDE SERPL-SCNC: 105 MMOL/L (ref 98–107)
CO2 SERPL-SCNC: 24 MMOL/L (ref 22–29)
CREAT SERPL-MCNC: 1.54 MG/DL (ref 0.72–1.25)
DIFFERENTIAL METHOD BLD: ABNORMAL
EGFR (NO RACE VARIABLE) (RUSH/TITUS): 55 ML/MIN/1.73M2
EOSINOPHIL # BLD AUTO: 0.08 K/UL (ref 0–0.5)
EOSINOPHIL NFR BLD AUTO: 1.1 % (ref 1–4)
ERYTHROCYTE [DISTWIDTH] IN BLOOD BY AUTOMATED COUNT: 16.5 % (ref 11.5–14.5)
GLOBULIN SER-MCNC: 3.4 G/DL (ref 2–4)
GLUCOSE SERPL-MCNC: 84 MG/DL (ref 74–100)
HCT VFR BLD AUTO: 36.1 % (ref 40–54)
HGB BLD-MCNC: 11.4 G/DL (ref 13.5–18)
IMM GRANULOCYTES # BLD AUTO: 0.01 K/UL (ref 0–0.04)
IMM GRANULOCYTES NFR BLD: 0.1 % (ref 0–0.4)
INR BLD: 1.08
LYMPHOCYTES # BLD AUTO: 2.8 K/UL (ref 1–4.8)
LYMPHOCYTES NFR BLD AUTO: 39.6 % (ref 27–41)
MCH RBC QN AUTO: 26.2 PG (ref 27–31)
MCHC RBC AUTO-ENTMCNC: 31.6 G/DL (ref 32–36)
MCV RBC AUTO: 83 FL (ref 80–96)
MONOCYTES # BLD AUTO: 0.38 K/UL (ref 0–0.8)
MONOCYTES NFR BLD AUTO: 5.4 % (ref 2–6)
MPC BLD CALC-MCNC: 11.1 FL (ref 9.4–12.4)
NEUTROPHILS # BLD AUTO: 3.75 K/UL (ref 1.8–7.7)
NEUTROPHILS NFR BLD AUTO: 53.1 % (ref 53–65)
NRBC # BLD AUTO: 0 X10E3/UL
NRBC, AUTO (.00): 0 %
NT-PROBNP SERPL-MCNC: 39 PG/ML (ref 1–125)
PLATELET # BLD AUTO: 221 K/UL (ref 150–400)
POTASSIUM SERPL-SCNC: 3.9 MMOL/L (ref 3.5–5.1)
PROT SERPL-MCNC: 7.4 G/DL (ref 6.4–8.3)
PROTHROMBIN TIME: 14.1 SECONDS (ref 11.7–14.7)
RBC # BLD AUTO: 4.35 M/UL (ref 4.6–6.2)
SODIUM SERPL-SCNC: 136 MMOL/L (ref 136–145)
TROPONIN I SERPL HS-MCNC: 4 NG/L
WBC # BLD AUTO: 7.07 K/UL (ref 4.5–11)

## 2025-06-10 PROCEDURE — 83880 ASSAY OF NATRIURETIC PEPTIDE: CPT | Performed by: FAMILY MEDICINE

## 2025-06-10 PROCEDURE — 93005 ELECTROCARDIOGRAM TRACING: CPT

## 2025-06-10 PROCEDURE — 99285 EMERGENCY DEPT VISIT HI MDM: CPT | Mod: 25

## 2025-06-10 PROCEDURE — 85025 COMPLETE CBC W/AUTO DIFF WBC: CPT | Performed by: FAMILY MEDICINE

## 2025-06-10 PROCEDURE — 80053 COMPREHEN METABOLIC PANEL: CPT | Performed by: FAMILY MEDICINE

## 2025-06-10 PROCEDURE — 96360 HYDRATION IV INFUSION INIT: CPT

## 2025-06-10 PROCEDURE — 85610 PROTHROMBIN TIME: CPT | Performed by: FAMILY MEDICINE

## 2025-06-10 PROCEDURE — 84484 ASSAY OF TROPONIN QUANT: CPT | Performed by: FAMILY MEDICINE

## 2025-06-10 PROCEDURE — 25000003 PHARM REV CODE 250: Performed by: EMERGENCY MEDICINE

## 2025-06-10 PROCEDURE — 93010 ELECTROCARDIOGRAM REPORT: CPT | Mod: ,,, | Performed by: INTERNAL MEDICINE

## 2025-06-10 RX ADMIN — SODIUM CHLORIDE 1000 ML: 9 INJECTION, SOLUTION INTRAVENOUS at 05:06

## 2025-06-10 NOTE — ED PROVIDER NOTES
Encounter Date: 6/10/2025    SCRIBE #1 NOTE: Shruthi VU, am scribing for, and in the presence of,  Cesar Alvares DO.       History     Chief Complaint   Patient presents with    Chest Pain     Patient presents to the ED with c/o left-sided chest pain that started today while at work. Patient states he has been having to readjust his blood pressure medications with his PCP, but states he started having some sharp pains today.     Patient is a 49 year old male with a history of DM2 and HTN presenting to the ED with complaints of left sided chest pain that started this morning while at work. He reports a recent adjustment of HTN medications with his PCP last week. He reports pain is sharp in nature. He denies any other symptoms.       The history is provided by the patient.     Review of patient's allergies indicates:   Allergen Reactions    Marijuana/cannabinoid      seizure    Melon      [email or web address]     Past Medical History:   Diagnosis Date    Diabetic eye exam 01/17/2025    Dr. Kenneth Xiong - [facility]    Erectile dysfunction     Hypercholesteremia 03/04/2016    Primary hypertension     Type 2 diabetes mellitus without complication, without long-term current use of insulin      Past Surgical History:   Procedure Laterality Date    CIRCUMCISION  1996    COLON RESECTION Right 11/16/2023    Procedure: COLON RESECTION;  Surgeon: Boby Godwin MD;  Location: [facility] OR;  Service: General;  Laterality: Right;    LEFT HEART CATHETERIZATION N/A 2/7/2025    Procedure: Left heart cath;  Surgeon: Artem Babin MD;  Location: [facility] CATH LAB;  Service: Cardiology;  Laterality: N/A;     Family History   Problem Relation Name Age of Onset    Cancer Mother      Diabetes Mother      Hypertension Mother      Hypertension Father      Diabetes Father      Hypertension Sister      Heart failure Brother      Hypertension Brother      Hypertension Brother      Heart failure Brother      Diabetes Brother       Kidney failure Brother      Heart disease Brother      Cancer Maternal Grandmother      Diabetes Maternal Grandmother      Hypertension Maternal Grandmother      Cancer Maternal Grandfather      Diabetes Maternal Grandfather      Hypertension Maternal Grandfather      Cancer Paternal Grandmother      Diabetes Paternal Grandmother      Hypertension Paternal Grandmother      Cancer Paternal Grandfather      Diabetes Paternal Grandfather      Hypertension Paternal Grandfather      No Known Problems Daughter      No Known Problems Daughter      No Known Problems Daughter      No Known Problems Daughter      No Known Problems Son      No Known Problems Son       Social History[1]  Review of Systems   Constitutional:  Negative for chills, diaphoresis, fatigue and fever.   HENT:  Negative for congestion.    Eyes: Negative.    Respiratory:  Negative for cough, chest tightness and shortness of breath.    Cardiovascular:  Positive for chest pain. Negative for palpitations and leg swelling.   Gastrointestinal:  Negative for abdominal pain, constipation, diarrhea, nausea and vomiting.       Physical Exam     Initial Vitals [06/10/25 1504]   BP Pulse Resp Temp SpO2   (!) 93/59 77 16 98.9 °F (37.2 °C) 97 %      MAP       --         Physical Exam    Nursing note and vitals reviewed.  Constitutional: He appears well-developed and well-nourished. He is not diaphoretic. No distress.   HENT:   Head: Normocephalic and atraumatic.   Eyes: EOM are normal.   Neck: Neck supple.   Normal range of motion.  Cardiovascular:  Normal rate, regular rhythm and normal heart sounds.           Pulmonary/Chest: Breath sounds normal. No respiratory distress. He has no wheezes. He exhibits no tenderness.   Abdominal: Abdomen is soft. Bowel sounds are normal.   Musculoskeletal:         General: No tenderness or edema. Normal range of motion.      Cervical back: Normal range of motion and neck supple. Normal.      Thoracic back: Normal.      Lumbar  back: Normal.     Neurological: He is alert and oriented to person, place, and time. He has normal strength.   Skin: Skin is warm and dry.   Psychiatric: He has a normal mood and affect. Thought content normal.         ED Course   Procedures  Labs Reviewed   COMPREHENSIVE METABOLIC PANEL - Abnormal       Result Value    Sodium 136      Potassium 3.9      Chloride 105      CO2 24      Anion Gap 11      Glucose 84      BUN 36 (*)     Creatinine 1.54 (*)     BUN/Creatinine Ratio 23 (*)     Calcium 9.6      Total Protein 7.4      Albumin 4.0      Globulin 3.4      A/G Ratio 1.2      Bilirubin, Total 1.0      Alk Phos 51      ALT 32      AST 29      eGFR 55 (*)    CBC WITH DIFFERENTIAL - Abnormal    WBC 7.07      RBC 4.35 (*)     Hemoglobin 11.4 (*)     Hematocrit 36.1 (*)     MCV 83.0      MCH 26.2 (*)     MCHC 31.6 (*)     RDW 16.5 (*)     Platelet Count 221      MPV 11.1      Neutrophils % 53.1      Lymphocytes % 39.6      Monocytes % 5.4      Eosinophils % 1.1      Basophils % 0.7      Immature Granulocytes % 0.1      nRBC, Auto 0.0      Neutrophils, Abs 3.75      Lymphocytes, Absolute 2.80      Monocytes, Absolute 0.38      Eosinophils, Absolute 0.08      Basophils, Absolute 0.05      Immature Granulocytes, Absolute 0.01      nRBC, Absolute 0.00      Diff Type Auto     NT-PRO NATRIURETIC PEPTIDE - Normal    ProBNP 39     TROPONIN I - Normal    Troponin I High Sensitivity 4.0     PROTIME-INR - Normal    PT 14.1      INR 1.08     CBC W/ AUTO DIFFERENTIAL    Narrative:     The following orders were created for panel order CBC auto differential.  Procedure                               Abnormality         Status                     ---------                               -----------         ------                     CBC with Differential[[id number]]       Abnormal            Final result                 Please view results for these tests on the individual orders.          Imaging Results              X-Ray Chest AP  Portable (Final result)  Result time 06/10/25 19:16:52      Final result by Derrick Castro MD (06/10/25 19:16:52)                   Impression:      No acute abnormality.      Electronically signed by: Derrick Castro  Date:    06/10/2025  Time:    19:16               Narrative:    EXAMINATION:  XR CHEST AP PORTABLE    CLINICAL HISTORY:  Chest Pain;    TECHNIQUE:  Single frontal view of the chest was performed.    COMPARISON:  01/02/2017    FINDINGS:  The lungs are clear, with normal appearance of pulmonary vasculature and no pleural effusion or pneumothorax.    The cardiac silhouette is normal in size. The hilar and mediastinal contours are unremarkable.    Bones are intact.                                       Medications   sodium chloride 0.9% bolus 1,000 mL 1,000 mL (0 mLs Intravenous Stopped 6/10/25 1849)     Medical Decision Making  Patient is a 49 year old male with a history of DM2 and HTN presenting to the ED with complaints of left sided chest pain that started this morning while at work. He rpeorts a recent adjustment of HTN medications with his PCP last week. He reports pain is sharp in nature. He denies any other symptoms.     Amount and/or Complexity of Data Reviewed  Labs: ordered.  Radiology: ordered.               ED Course as of 06/11/25 0602   Tue Jules 10, 2025   1707 Patient signed out from Dr. Alvares.  He is here for chest pain.  Awaiting labs.  Can be discharged if labs are normal. [BB]   1740 Troponin is normal.  Pro BNP is normal.  CBC is normal. [BB]   1740 CMP shows elevated BUN and creatinine consistent with dehydration, JERRY. [BB]   1741 Chest x-ray by my interpretation shows no acute disease. [BB]      ED Course User Index  [BB] Petey Rodriguez MD                           Clinical Impression:  Final diagnoses:  [R07.9] Chest pain          ED Disposition Condition    Discharge Stable          ED Prescriptions    None       Follow-up Information    None                [1]   Social  History  Tobacco Use    Smoking status: Never    Smokeless tobacco: Never   Substance Use Topics    Alcohol use: Not Currently     Comment: drinks some liquor one day each weekend    Drug use: Not Currently     Comment: marijuana caused a seizure        Petey Rodriguez MD  06/11/25 0602

## 2025-06-10 NOTE — Clinical Note
"Russel Leyvafloyd Hill was seen and treated in our emergency department on 6/10/2025.  He may return to work on 06/11/2025.       If you have any questions or concerns, please don't hesitate to call.      JO-ANN Godwin RN    "

## 2025-06-10 NOTE — DISCHARGE INSTRUCTIONS
Drink lots of water.  Return to emergency department for any worsening or further problems.  Follow up in clinic with primary care provider in 2-3 days to recheck renal function.

## 2025-06-12 ENCOUNTER — TELEPHONE (OUTPATIENT)
Dept: EMERGENCY MEDICINE | Facility: HOSPITAL | Age: 50
End: 2025-06-12
Payer: COMMERCIAL

## 2025-06-15 LAB
OHS QRS DURATION: 92 MS
OHS QTC CALCULATION: 384 MS

## 2025-07-02 ENCOUNTER — OFFICE VISIT (OUTPATIENT)
Dept: FAMILY MEDICINE | Facility: CLINIC | Age: 50
End: 2025-07-02
Payer: COMMERCIAL

## 2025-07-02 VITALS
DIASTOLIC BLOOD PRESSURE: 65 MMHG | TEMPERATURE: 99 F | BODY MASS INDEX: 31.8 KG/M2 | HEIGHT: 67 IN | WEIGHT: 202.63 LBS | SYSTOLIC BLOOD PRESSURE: 100 MMHG | OXYGEN SATURATION: 98 % | RESPIRATION RATE: 20 BRPM | HEART RATE: 74 BPM

## 2025-07-02 DIAGNOSIS — N40.1 BENIGN PROSTATIC HYPERPLASIA WITH URINARY OBSTRUCTION: Primary | Chronic | ICD-10-CM

## 2025-07-02 DIAGNOSIS — I10 PRIMARY HYPERTENSION: Chronic | ICD-10-CM

## 2025-07-02 DIAGNOSIS — N13.8 BENIGN PROSTATIC HYPERPLASIA WITH URINARY OBSTRUCTION: Primary | Chronic | ICD-10-CM

## 2025-07-02 DIAGNOSIS — Z85.038 HISTORY OF COLON CANCER, STAGE I: ICD-10-CM

## 2025-07-02 PROBLEM — C18.9 MALIGNANT NEOPLASM OF COLON: Status: RESOLVED | Noted: 2023-10-31 | Resolved: 2025-07-02

## 2025-07-02 PROCEDURE — 3066F NEPHROPATHY DOC TX: CPT | Mod: CPTII,,, | Performed by: NURSE PRACTITIONER

## 2025-07-02 PROCEDURE — 3044F HG A1C LEVEL LT 7.0%: CPT | Mod: CPTII,,, | Performed by: NURSE PRACTITIONER

## 2025-07-02 PROCEDURE — 4010F ACE/ARB THERAPY RXD/TAKEN: CPT | Mod: CPTII,,, | Performed by: NURSE PRACTITIONER

## 2025-07-02 PROCEDURE — 3008F BODY MASS INDEX DOCD: CPT | Mod: CPTII,,, | Performed by: NURSE PRACTITIONER

## 2025-07-02 PROCEDURE — 3078F DIAST BP <80 MM HG: CPT | Mod: CPTII,,, | Performed by: NURSE PRACTITIONER

## 2025-07-02 PROCEDURE — 3074F SYST BP LT 130 MM HG: CPT | Mod: CPTII,,, | Performed by: NURSE PRACTITIONER

## 2025-07-02 PROCEDURE — 2023F DILAT RTA XM W/O RTNOPTHY: CPT | Mod: CPTII,,, | Performed by: NURSE PRACTITIONER

## 2025-07-02 PROCEDURE — 1159F MED LIST DOCD IN RCRD: CPT | Mod: CPTII,,, | Performed by: NURSE PRACTITIONER

## 2025-07-02 PROCEDURE — 99214 OFFICE O/P EST MOD 30 MIN: CPT | Mod: ,,, | Performed by: NURSE PRACTITIONER

## 2025-07-02 PROCEDURE — 3061F NEG MICROALBUMINURIA REV: CPT | Mod: CPTII,,, | Performed by: NURSE PRACTITIONER

## 2025-07-02 PROCEDURE — 1160F RVW MEDS BY RX/DR IN RCRD: CPT | Mod: CPTII,,, | Performed by: NURSE PRACTITIONER

## 2025-07-02 RX ORDER — TAMSULOSIN HYDROCHLORIDE 0.4 MG/1
0.4 CAPSULE ORAL 2 TIMES DAILY
Qty: 180 CAPSULE | Refills: 3 | Status: SHIPPED | OUTPATIENT
Start: 2025-07-02

## 2025-07-02 RX ORDER — DUTASTERIDE 0.5 MG/1
0.5 CAPSULE, LIQUID FILLED ORAL DAILY
Qty: 90 CAPSULE | Refills: 3 | Status: SHIPPED | OUTPATIENT
Start: 2025-07-02

## 2025-07-02 RX ORDER — METOPROLOL SUCCINATE 25 MG/1
25 TABLET, EXTENDED RELEASE ORAL DAILY
COMMUNITY
Start: 2025-06-06

## 2025-07-02 NOTE — PROGRESS NOTES
[facility]  [address] [place] [address]  Phone: [phone]  Fax: [phone]       PATIENT NAME: Russel Hill   : 1975    AGE: 49 y.o. DATE OF ENCOUNTER: 25    MRN: [id number]      PCP: Charlotte Martinez FNP    Subjective:   CHANGE CHIEF COMPLAINT      :[id number]}274}  Chief Complaint   Patient presents with    Follow-up     Patient reports to the clinic today for 1 month follow up post  infection and medication change. He states he has been biting the inside of his jaw when eating and has made it sore.     History of Present Illness    HPI:  Patient, a 49-year-old white male with a history of enlarged prostate, presents for a follow-up visit 2 months after his last appointment and 1 month after completing antibiotic treatment. He has ongoing prostate and urinary symptoms, including difficulty urinating with a slow urine stream that requires straining, and nocturia. At his previous visit on 2025, his tamsulosin dosage was doubled from 0.4 mg daily to 0.8 mg daily, providing some relief but he reports he has actually been taking 0.8 mg in the morning and 0.4 mg in the evening. He was also started on doxycycline twice daily for prostatitis with intermittent extreme straining, nocturia, and dysuria, which he has now completed and voices improvement of symptoms.    He has a history of urology evaluation, including a past cystoscopy that showed no tumor, stones, or diverticula. He was diagnosed with a large prostate, BPH, and chronic prostatitis. After completing the antibiotic treatment, he no longer has pain or burning with urination.    On 06/10/2025, he was evaluated at the ER for left side chest pain. The outcome was satisfactory. During the current visit, he went to exercise and was informed by a physician that he was dehydrated.    His blood pressure has been running lower than normal. Last visit, his nifedipine was decreased from 60 to 30 mg  daily, and lisinopril was reduced from 20 to 10 mg daily. Today, his blood pressure is 100/65, which is not as low as before but still considered low.    Regarding weight, he has been working on losing weight he had regained, but he is up 4 lbs since his previous visit, from 198 to 202 lbs. His diabetes is in remission following a weight loss of over 40 lbs, with his most recent A1C at 5.4%. As a result, metformin was completely stopped.    He expresses interest in having his testosterone levels checked and inquires about testosterone therapy. He also mentions having bitten the inside of his left cheek, which has become sore and swollen, making it difficult to eat normally.    He denies current chest pain, leg swelling, or fluid retention.      ROS:  ENT: +mouth pain  Cardiovascular: +chest pain, -lower extremity edema  Gastrointestinal: +constipation  Genitourinary: -dysuria, +difficulty urinating, +nocturia         Allergies and Meds: 274}     Review of patient's allergies indicates:   Allergen Reactions    Marijuana/cannabinoid      seizure    Melon      watermellon        Current Outpatient Medications   Medication Sig Dispense Refill    doxycycline (VIBRAMYCIN) 100 MG Cap take one capsule twice a day for 10 days then reduce to one daily, avoid direct sunlight, no milk, milk products or antacids within two hours of taking this medication 40 capsule 0    linaCLOtide (LINZESS) 145 mcg Cap capsule Take 1 capsule (145 mcg total) by mouth before breakfast. 90 capsule 3    lisinopriL 10 MG tablet Take 1 tablet (10 mg total) by mouth once daily. 90 tablet 3    metoprolol succinate (TOPROL-XL) 25 MG 24 hr tablet Take 25 mg by mouth once daily.      NIFEdipine (ADALAT CC) 30 MG TbSR Take 1 tablet (30 mg total) by mouth once daily. 90 tablet 3    potassium chloride SA (K-DUR,KLOR-CON) 20 MEQ tablet Take 1 tablet (20 mEq total) by mouth once daily. 90 tablet 3    pravastatin (PRAVACHOL) 20 MG tablet Take 1 tablet (20 mg  total) by mouth once daily. 90 tablet 3    semaglutide (OZEMPIC) 2 mg/dose (8 mg/3 mL) PnIj Inject 2 mg into the skin every 7 days. 9 mL 3    dutasteride (AVODART) 0.5 mg capsule Take 1 capsule (0.5 mg total) by mouth once daily. 90 capsule 3    tamsulosin (FLOMAX) 0.4 mg Cap Take 1 capsule (0.4 mg total) by mouth 2 (two) times a day. 180 capsule 3     No current facility-administered medications for this visit.       Labs:274}   I have reviewed labs below:    Lab Results   Component Value Date    WBC 7.07 06/10/2025    RBC 4.35 (L) 06/10/2025    HGB 11.4 (L) 06/10/2025    HCT 36.1 (L) 06/10/2025     06/10/2025     06/10/2025    K 3.9 06/10/2025     06/10/2025    CALCIUM 9.6 06/10/2025    GLU 84 06/10/2025    BUN 36 (H) 06/10/2025    CREATININE 1.54 (H) 06/10/2025    ESTGFRAFRICA 113 05/12/2021    EGFRNONAA 69 06/16/2022    ALT 32 06/10/2025    AST 29 06/10/2025    INR 1.08 06/10/2025    CHOL 160 09/05/2024    TRIG 150 09/05/2024    HDL 60 09/05/2024    LDLCALC 70 09/05/2024    TSH 0.620 01/08/2025    PSA 0.396 09/05/2024    HGBA1C 5.4 05/08/2025    MICROALBUR 2.5 01/08/2025       Medical History: 274}     Past Medical History:   Diagnosis Date    Diabetic eye exam 01/17/2025    Dr. Kenneth Xiong [facility]    Erectile dysfunction     Hypercholesteremia 03/04/2016    Malignant neoplasm of colon 10/31/2023    Resected in November of 2023, no evidence of recurrence to date      Primary hypertension     Type 2 diabetes mellitus without complication, without long-term current use of insulin       Tobacco Use History[1]   Past Surgical History:   Procedure Laterality Date    CIRCUMCISION  1996    COLON RESECTION Right 11/16/2023    Procedure: COLON RESECTION;  Surgeon: Boby Godwin MD;  Location: [facility] OR;  Service: General;  Laterality: Right;    LEFT HEART CATHETERIZATION N/A 2/7/2025    Procedure: Left heart cath;  Surgeon: Artem Babin MD;  Location: [facility] CATH LAB;   "Service: Cardiology;  Laterality: N/A;        Health Maintenance:      Health Maintenance Topics with due status: Not Due       Topic Last Completion Date    TETANUS VACCINE 05/20/2024    Lipid Panel 09/05/2024    Diabetes Urine Screening 01/08/2025    Foot Exam 01/08/2025    Diabetic Eye Exam 01/17/2025    Colorectal Cancer Screening 02/04/2025    Hemoglobin A1c 05/08/2025    Low Dose Statin 07/02/2025    RSV Vaccine (Age 60+ and Pregnant patients) Not Due       Objective:  274}   Vital Signs  Temp: 98.8 °F (37.1 °C)  Temp Source: Oral  Pulse: 74  Resp: 20  SpO2: 98 %  BP: 100/65  BP Location: Left arm  Patient Position: Sitting  Pain Score: 0-No pain  Height and Weight  Height: 5' 7" (170.2 cm)  Weight: 91.9 kg (202 lb 9.6 oz)  BSA (Calculated - sq m): 2.08 sq meters  BMI (Calculated): 31.7  Weight in (lb) to have BMI = 25: 159.3    Over the last two weeks how often have you been bothered by little interest or pleasure in doing things: 0  Over the last two weeks how often have you been bothered by feeling down, depressed or hopeless: 0  PHQ-2 Total Score: 0    Wt Readings from Last 3 Encounters:   07/02/25 91.9 kg (202 lb 9.6 oz)   06/10/25 92.1 kg (203 lb)   05/08/25 90.2 kg (198 lb 12.8 oz)     Physical Exam  Vitals and nursing note reviewed.   Constitutional:       General: He is not in acute distress.     Appearance: Normal appearance. He is not ill-appearing.   HENT:      Head: Normocephalic.   Eyes:      Conjunctiva/sclera: Conjunctivae normal.   Neck:      Trachea: Trachea normal.   Cardiovascular:      Rate and Rhythm: Normal rate and regular rhythm.      Pulses: Normal pulses.      Heart sounds: Normal heart sounds.   Pulmonary:      Effort: Pulmonary effort is normal. No respiratory distress.      Breath sounds: Normal breath sounds. No wheezing, rhonchi or rales.   Abdominal:      Palpations: Abdomen is soft.      Tenderness: There is no abdominal tenderness.   Musculoskeletal:      Cervical back: Neck " supple.      Right lower leg: No edema.      Left lower leg: No edema.   Lymphadenopathy:      Cervical: No cervical adenopathy.   Skin:     General: Skin is warm and dry.      Coloration: Skin is not jaundiced or pale.   Neurological:      Mental Status: He is alert and oriented to person, place, and time.      Gait: Gait normal.   Psychiatric:         Mood and Affect: Mood normal.         Behavior: Behavior normal.          Assessment and Plan: 274}       1. Benign prostatic hyperplasia with urinary obstruction  Assessment & Plan:  Symptoms are improved since completing doxycycline but rather than taking Flomax/ tamsulosin 0.8 mg daily as prescribed he has been taking 0.8 mg in the morning and 0.4 mg at night.    Advised to take tamsulosin 0.4 mg 2 tablets once daily or can take 1 twice daily but 0.8 mg is the maximum dose.  Add Avodart/dutasteride 0.5 mg daily to shrink prostate size but advised it can take up to 6-mth to notice improvement.      Orders:  -     tamsulosin (FLOMAX) 0.4 mg Cap; Take 1 capsule (0.4 mg total) by mouth 2 (two) times a day.  Dispense: 180 capsule; Refill: 3  -     dutasteride (AVODART) 0.5 mg capsule; Take 1 capsule (0.5 mg total) by mouth once daily.  Dispense: 90 capsule; Refill: 3    2. History of colon cancer, stage I  Overview:  History of hemicolectomy for colon cancer November of 2023      3. Primary hypertension  Assessment & Plan:  BP Readings from Last 4 Encounters:   07/02/25 100/65   06/10/25 127/79   05/08/25 96/62   02/17/25 100/70     BP better but still low normal since decreasing nifedipine from 60 mg to 30 mg daily and lisinopril from 20 mg to 10 mg at previous visit.    BMP  Lab Results   Component Value Date     06/10/2025    K 3.9 06/10/2025     06/10/2025    CO2 24 06/10/2025    BUN 36 (H) 06/10/2025    CREATININE 1.54 (H) 06/10/2025    CALCIUM 9.6 06/10/2025    ANIONGAP 11 06/10/2025    EGFRNORACEVR 55 (L) 06/10/2025     Discontinue triam-hctz due to  dehydration and renal impairment.  Continue metoprolol XL 50 mg daily.           Assessment & Plan    IMPRESSION:  - Assessed urinary symptoms related to enlarged prostate and chronic prostatitis; noted improvement after completion of doxycycline course.  - Evaluated BP management; considering further medication adjustments due to continued low readings (100/65).  - Reviewed recent ER visit for left-sided chest pain; noted no significant findings.  - Discontinued triamterene/HCTZ due to concerns about dehydration and lack of fluid retention.  - Considered testosterone therapy but opted against it due to potential for prostate enlargement but would still like to have his testosterone level checked when he has his wellness visit in September.  - Evaluated oral lesion inside left cheek from biting and provided information on healing process; recommended [employer] peroxide rinse (Peroxyl) several times daily.    BENIGN PROSTATIC HYPERPLASIA (BPH) WITH LOWER URINARY TRACT SYMPTOMS:  - Examined patient's enlarged prostate causing urinary symptoms including difficulty urinating, slow stream requiring straining, and some pain.  - Explained BPH and its effects on urination, including the relationship between prostate size and symptoms, and potential effects of testosterone therapy on prostate size.  - Started dutasteride (Avodart) 1 tablet daily in the morning to shrink the prostate over time.  - Adjusted tamsulosin (Flomax) to 1 capsule in the morning and 1 capsule at night.  - Ordered testosterone level check on the morning of next visit (between 8-10 AM).  - Educated patient on benefits of regular sexual activity for prostate health.    CHRONIC PROSTATITIS:  - Patient has chronic prostatitis with past cystoscope showing no tumor, stones, or diverticula.  - Noted improvement in symptoms of pain and burning with urination after completing doxycycline treatment.  - Discussed chronic prostate infections and their treatment with  antibiotics when infections recur.  - Will not prescribe ongoing antibiotic treatment unless infections recur.    ESSENTIAL HYPERTENSION:  - Blood pressure today measured at 100/65, continuing to run on the low side as noted at last visit.  - Decreased nifedipine from 60 to 30 mg daily and lisinopril from 20 to 10 mg daily.  - Continued metoprolol succinate.  - Plan to discontinue triamterene hydrochlorothiazide due to dehydration concerns and low blood pressure affecting urinary symptoms.    TYPE 2 DIABETES MELLITUS (IN REMISSION):  - Diabetes is in remission with over 40-pound weight loss and most recent A1C of 5.4%.  - Discontinued metformin completely due to diabetes remission.    MUCOSAL INJURY:  - Examined patient's sore and swollen buccal mucosa due to biting.  - Recommend chewing food more slowly or consciously on the right side to avoid biting the sore area.  - Advised using [employer] peroxide rinse (Peroxyl) several times daily to help heal jaw injury.    CHEST PAIN:  - Patient went to ER for left side chest pain, but all tests were normal with no significant issues identified aside from dehydration so triamterene hydrochlorothiazide was discontinued.    HISTORY OF COLON CANCER AND RIGHT HEMICOLECTOMY:  - Patient had right hemicolectomy for colon cancer.  - Patient inquiring about needing to have his kidneys check.  Sept 2024 CT showed no abdominal or pelvic lymphadenopathy, no evidence of kidney stone, and no bowel obstruction.  We will recheck his kidney function lab tests on next visit.  - CT confirmed right hemicolectomy and showed clear lungs, liver, spleen, adrenals, kidneys, pancreas, and gallbladder.  - Patient is up to date on colon cancer screenings with next colonoscopy due in 2 years.    FOLLOW-UP AND SCREENING:  - Ordered PSA (prostate cancer screening) at September 16th wellness visit.  - Patient to follow up on September 16th for wellness visit.     Signature:  Charlotte Martinez,  [facility]-BC    Future Appointments   Date Time Provider Department Center   8/19/2025  2:45 PM Artem Babin MD [facility] CARD [facility]   9/16/2025  9:20 AM Charlotte Martinez [facility] ZULY Torres     This note was generated with the assistance of ambient listening technology. Verbal consent was obtained by the patient and accompanying visitor(s) for the recording of patient appointment to facilitate this note. I attest to having reviewed and edited the generated note for accuracy, though some syntax or spelling errors may persist. Please contact the author of this note for any clarification.           [1]   Social History  Tobacco Use   Smoking Status Never   Smokeless Tobacco Never

## 2025-07-02 NOTE — ASSESSMENT & PLAN NOTE
BP Readings from Last 4 Encounters:   07/02/25 100/65   06/10/25 127/79   05/08/25 96/62   02/17/25 100/70     BP better but still low normal since decreasing nifedipine from 60 mg to 30 mg daily and lisinopril from 20 mg to 10 mg at previous visit.    BMP  Lab Results   Component Value Date     06/10/2025    K 3.9 06/10/2025     06/10/2025    CO2 24 06/10/2025    BUN 36 (H) 06/10/2025    CREATININE 1.54 (H) 06/10/2025    CALCIUM 9.6 06/10/2025    ANIONGAP 11 06/10/2025    EGFRNORACEVR 55 (L) 06/10/2025     Discontinue triam-hctz due to dehydration and renal impairment.  Continue metoprolol XL 50 mg daily.

## 2025-07-02 NOTE — LETTER
July 2, 2025    Russel Hill  [address] [place] [address] [place] [address]             [facility]  Family Medicine  [address] [place] [address] [place] [address]  Phone: [phone]  Fax: [phone]   July 2, 2025     Patient: Russel Hill   YOB: 1975   Date of Visit: 7/2/2025       To Whom it May Concern:    Russel Hill was seen in my clinic on 7/2/2025. He may return to work on 07/02/2025.    Please excuse him from any classes or work missed.  If you have any questions or concerns, please don't hesitate to call.    Sincerely,         Charlotte Martinez, IZZYP

## 2025-07-02 NOTE — ASSESSMENT & PLAN NOTE
Symptoms are improved since completing doxycycline but rather than taking Flomax/ tamsulosin 0.8 mg daily as prescribed he has been taking 0.8 mg in the morning and 0.4 mg at night.    Advised to take tamsulosin 0.4 mg 2 tablets once daily or can take 1 twice daily but 0.8 mg is the maximum dose.  Add Avodart/dutasteride 0.5 mg daily to shrink prostate size but advised it can take up to 6-mth to notice improvement.

## (undated) DEVICE — Device

## (undated) DEVICE — SOL NACL IRR 1000ML BTL

## (undated) DEVICE — SPONGE COTTON TRAY 4X4IN

## (undated) DEVICE — STAPLER SKIN SUBCUTICULAR

## (undated) DEVICE — TR BAND REGULAR

## (undated) DEVICE — RELOAD PROXIMATE CUT BLUE 75MM

## (undated) DEVICE — TOWEL OR XRAY BLUE 17X26IN

## (undated) DEVICE — SUT 0 18IN SILK BLK BRAIDE

## (undated) DEVICE — KIT IV START

## (undated) DEVICE — GLOVE PROTEXIS PI SYN SURG 7

## (undated) DEVICE — SET EXT W/2 VLVE PORTS 40

## (undated) DEVICE — CHLORAPREP 10.5 ML APPLICATOR

## (undated) DEVICE — PROTECTOR ULNAR NERVE FOAM

## (undated) DEVICE — GOWN POLY REINF BRTH SLV XL

## (undated) DEVICE — ETCO2 NC MICROSTR FEM ST ADLT

## (undated) DEVICE — CATH DIAG JACKY 3.5 6FRX110CM

## (undated) DEVICE — GLOVE SURG ULTRA TOUCH 6

## (undated) DEVICE — GOWN NONREINF SET-IN SLV 2XL

## (undated) DEVICE — SET FLD DEL LG BORE SPIK 72IN

## (undated) DEVICE — SUT 1 48IN PDS II VIO MONO

## (undated) DEVICE — LEADWIRE DL DC EKG 10 PIN

## (undated) DEVICE — SEALER LIGASURE IMPACT 18CM

## (undated) DEVICE — SUT GUT CHROMIC 3-0 SH 36IN

## (undated) DEVICE — OXISENSOR ADULT DIGIT N/S

## (undated) DEVICE — GLOVE PROTEXIS PI SYN SURG 8.0

## (undated) DEVICE — GLOVE PROTEXIS PI SYN SURG 6.5

## (undated) DEVICE — STRIP MEDI WND CLSR 1/2X4IN

## (undated) DEVICE — PAD RADIOLUCENT STAT ADULT

## (undated) DEVICE — CATH INTROCAN SAF 2 IV 20GX1IN

## (undated) DEVICE — SUT CTD VICRYL 3-0 CR/SH

## (undated) DEVICE — CLOTH READYPREP 2%CHG 9X10.5IN

## (undated) DEVICE — KIT BASIC RUSH

## (undated) DEVICE — CONTRAST ISOVUE 370 100ML

## (undated) DEVICE — CUFF FLEXIPORT BP LONG ADULT

## (undated) DEVICE — SET IV PRIMARY

## (undated) DEVICE — DRESSING TRANS 4X4 TEGADERM

## (undated) DEVICE — SET EXT IV CATH ONE LINK 8.5IN

## (undated) DEVICE — DECANTER FLUID TRNSF WHITE 9IN

## (undated) DEVICE — GLOVE SIGNATURE ESSNTL LTX 7.5

## (undated) DEVICE — SUT SILK 3-0 SH DETACH 30IN

## (undated) DEVICE — DRAPE ANGIO BRACH 38X44IN

## (undated) DEVICE — CLIPPER BLADE MOD 4406 (CAREF)

## (undated) DEVICE — KIT GLIDESHEATH SLEND 6FR 10CM

## (undated) DEVICE — STAPLER INT PROX TX 60X3.5MM

## (undated) DEVICE — CUTTER PROXIMATE BLUE 75MM

## (undated) DEVICE — GLOVE SENSICARE PI SURG 7

## (undated) DEVICE — GAUZE SPONGE 4X4 12PLY

## (undated) DEVICE — BLADE ELECTRO EDGE INSULATED